# Patient Record
Sex: MALE | Race: BLACK OR AFRICAN AMERICAN | NOT HISPANIC OR LATINO | Employment: UNEMPLOYED | URBAN - METROPOLITAN AREA
[De-identification: names, ages, dates, MRNs, and addresses within clinical notes are randomized per-mention and may not be internally consistent; named-entity substitution may affect disease eponyms.]

---

## 2022-01-26 ENCOUNTER — HISTORICAL (OUTPATIENT)
Dept: GASTROENTEROLOGY | Facility: CLINIC | Age: 62
End: 2022-01-26

## 2022-01-27 ENCOUNTER — HISTORICAL (OUTPATIENT)
Dept: GASTROENTEROLOGY | Facility: CLINIC | Age: 62
End: 2022-01-27

## 2022-01-27 LAB — SARS-COV-2 AG RESP QL IA.RAPID: NEGATIVE

## 2022-01-28 ENCOUNTER — HISTORICAL (OUTPATIENT)
Dept: ENDOSCOPY | Facility: HOSPITAL | Age: 62
End: 2022-01-28

## 2022-02-14 ENCOUNTER — HISTORICAL (OUTPATIENT)
Dept: INTERNAL MEDICINE | Facility: CLINIC | Age: 62
End: 2022-02-14

## 2022-02-14 LAB
ABS NEUT (OLG): 2.92 (ref 2.1–9.2)
ALBUMIN SERPL-MCNC: 4.2 G/DL (ref 3.4–4.8)
ALBUMIN/GLOB SERPL: 1 {RATIO} (ref 1.1–2)
ALP SERPL-CCNC: 80 U/L (ref 40–150)
ALT SERPL-CCNC: 8 U/L (ref 0–55)
AST SERPL-CCNC: 32 U/L (ref 5–34)
BASOPHILS # BLD AUTO: 0 10*3/UL (ref 0–0.2)
BASOPHILS NFR BLD AUTO: 1 %
BILIRUB SERPL-MCNC: 0.9 MG/DL
BILIRUBIN DIRECT+TOT PNL SERPL-MCNC: 0.3 (ref 0–0.5)
BILIRUBIN DIRECT+TOT PNL SERPL-MCNC: 0.6 (ref 0–0.8)
BUN SERPL-MCNC: 7.6 MG/DL (ref 8.4–25.7)
CALCIUM SERPL-MCNC: 9.3 MG/DL (ref 8.7–10.5)
CHLORIDE SERPL-SCNC: 102 MMOL/L (ref 98–107)
CHOLEST SERPL-MCNC: 230 MG/DL
CHOLEST/HDLC SERPL: 3 {RATIO} (ref 0–5)
CO2 SERPL-SCNC: 26 MMOL/L (ref 23–31)
CREAT SERPL-MCNC: 0.77 MG/DL (ref 0.73–1.18)
EOSINOPHIL # BLD AUTO: 0.2 10*3/UL (ref 0–0.9)
EOSINOPHIL NFR BLD AUTO: 3 %
ERYTHROCYTE [DISTWIDTH] IN BLOOD BY AUTOMATED COUNT: 14.4 % (ref 11.5–14.5)
EST. AVERAGE GLUCOSE BLD GHB EST-MCNC: 99.7 MG/DL
FLAG2 (OHS): 70
FLAG3 (OHS): 80
FLAGS (OHS): 80
GLOBULIN SER-MCNC: 4 G/DL (ref 2.4–3.5)
GLUCOSE SERPL-MCNC: 116 MG/DL (ref 82–115)
HBA1C MFR BLD: 5.1 %
HCT VFR BLD AUTO: 43.1 % (ref 40–51)
HDLC SERPL-MCNC: 73 MG/DL (ref 35–60)
HEMOLYSIS INTERF INDEX SERPL-ACNC: 5
HGB BLD-MCNC: 14.5 G/DL (ref 13.5–17.5)
HIV 1+2 AB+HIV1 P24 AG SERPL QL IA: 0.05
HIV 1+2 AB+HIV1 P24 AG SERPL QL IA: NONREACTIVE
ICTERIC INTERF INDEX SERPL-ACNC: 1
IMM GRANULOCYTES # BLD AUTO: 0.02 10*3/UL
IMM GRANULOCYTES NFR BLD AUTO: 0 %
LDLC SERPL CALC-MCNC: 136 MG/DL (ref 50–140)
LIPEMIC INTERF INDEX SERPL-ACNC: 3
LOW EVENT # SUSPECT FLAG (OHS): 80
LYMPHOCYTES # BLD AUTO: 1.6 10*3/UL (ref 0.6–4.6)
LYMPHOCYTES NFR BLD AUTO: 31 %
MANUAL DIFF? (OHS): NO
MCH RBC QN AUTO: 30.2 PG (ref 26–34)
MCHC RBC AUTO-ENTMCNC: 33.6 G/DL (ref 31–37)
MCV RBC AUTO: 89.8 FL (ref 80–100)
MO BLASTS SUSPECT FLAG (OHS): 30
MONOCYTES # BLD AUTO: 0.4 10*3/UL (ref 0.1–1.3)
MONOCYTES NFR BLD AUTO: 8 %
NEUTROPHILS # BLD AUTO: 2.92 10*3/UL (ref 2.1–9.2)
NEUTROPHILS NFR BLD AUTO: 57 %
NRBC BLD AUTO-RTO: 0 % (ref 0–0.2)
PLATELET # BLD AUTO: 109 10*3/UL (ref 130–400)
PLATELET CLUMPS SUSPECT FLAG (OHS): 160
PMV BLD AUTO: 11 FL (ref 7.4–10.4)
POTASSIUM SERPL-SCNC: 3.3 MMOL/L (ref 3.5–5.1)
PROT SERPL-MCNC: 8.2 G/DL (ref 5.8–7.6)
PSA SERPL-MCNC: 0.5 NG/ML
RBC # BLD AUTO: 4.8 10*6/UL (ref 4.5–5.9)
SODIUM SERPL-SCNC: 138 MMOL/L (ref 136–145)
TRIGL SERPL-MCNC: 107 MG/DL (ref 34–140)
TSH SERPL-ACNC: 0.58 M[IU]/L (ref 0.35–4.94)
VLDLC SERPL CALC-MCNC: 21 MG/DL
WBC # SPEC AUTO: 5.1 10*3/UL (ref 4.5–11)

## 2022-03-09 ENCOUNTER — HISTORICAL (OUTPATIENT)
Dept: RADIOLOGY | Facility: HOSPITAL | Age: 62
End: 2022-03-09

## 2022-03-09 ENCOUNTER — HISTORICAL (OUTPATIENT)
Dept: ADMINISTRATIVE | Facility: HOSPITAL | Age: 62
End: 2022-03-09

## 2022-03-17 ENCOUNTER — HISTORICAL (OUTPATIENT)
Dept: RADIOLOGY | Facility: HOSPITAL | Age: 62
End: 2022-03-17

## 2022-04-11 ENCOUNTER — HISTORICAL (OUTPATIENT)
Dept: ADMINISTRATIVE | Facility: HOSPITAL | Age: 62
End: 2022-04-11

## 2022-04-29 VITALS
WEIGHT: 200.19 LBS | DIASTOLIC BLOOD PRESSURE: 80 MMHG | BODY MASS INDEX: 29.65 KG/M2 | SYSTOLIC BLOOD PRESSURE: 131 MMHG | HEIGHT: 69 IN

## 2022-05-10 ENCOUNTER — OFFICE VISIT (OUTPATIENT)
Dept: SURGERY | Facility: CLINIC | Age: 62
End: 2022-05-10
Payer: MEDICAID

## 2022-05-10 VITALS
WEIGHT: 188.5 LBS | TEMPERATURE: 98 F | HEIGHT: 69 IN | OXYGEN SATURATION: 97 % | DIASTOLIC BLOOD PRESSURE: 77 MMHG | BODY MASS INDEX: 27.92 KG/M2 | SYSTOLIC BLOOD PRESSURE: 127 MMHG | HEART RATE: 91 BPM | RESPIRATION RATE: 20 BRPM

## 2022-05-10 DIAGNOSIS — M79.89 MASS OF SOFT TISSUE OF NECK: ICD-10-CM

## 2022-05-10 DIAGNOSIS — K13.79 NODULE OF CHEEK: Primary | ICD-10-CM

## 2022-05-10 DIAGNOSIS — D31.92 BENIGN NEOPLASM OF LEFT EYE: ICD-10-CM

## 2022-05-10 DIAGNOSIS — R22.0 FACIAL MASS: ICD-10-CM

## 2022-05-10 PROCEDURE — 99214 OFFICE O/P EST MOD 30 MIN: CPT | Mod: PBBFAC

## 2022-05-10 RX ORDER — FAMOTIDINE 40 MG/1
40 TABLET, FILM COATED ORAL
COMMUNITY
Start: 2021-10-13 | End: 2022-08-01 | Stop reason: SDUPTHER

## 2022-05-10 RX ORDER — GABAPENTIN 100 MG/1
200 CAPSULE ORAL
COMMUNITY
Start: 2022-04-04 | End: 2022-08-01

## 2022-05-10 NOTE — PROGRESS NOTES
[facility] General Surgery Clinic Note    CC: facial and neck nodules    Arcadio Scott is a 61 y.o. male presenting with  multiple nodules on his face and neck.     HPI:   Mr. Scott noted these nodules first appeared in 2019 and have progressively worsened. He notes some associated pruritis but denies any significant pain or drainage from the area. He has tried OTC boil cream with no improvement. He noted previously having some nodules removed on his R cheek in 1993, but does not recall associated diagnosis or pathology. He denies recurrence of these nodules on this right cheek. He does have a family hx significant for these nodules, specifically in his father and brother. He denies associated fevers. He denies use of blood thinners or previous adverse reactions related to anesthesia medications.    PMH:  -Lower extremity neuropathy      PSH:   - Nodule removal right check - 1993     FamHx:   Family History   Problem Relation Age of Onset    Cancer Father         Unsure of source    Diabetes Mother     Heart disease Mother     Aneurysm Sister     Diabetes Brother     Stomach cancer Brother      - Father and brother facial nodules      SocHx:  Social History     Socioeconomic History    Marital status: Single   Tobacco Use    Smoking status: Smoker, Current Status Unknown     Types: Cigars    Smokeless tobacco: Never Used    Tobacco comment: 1 or 2 cigars a week   Substance and Sexual Activity    Alcohol use: Yes     Alcohol/week: 1.0 standard drink     Types: 1 Cans of beer per week     Comment: every now and then    Drug use: Never       Allergies:   Review of patient's allergies indicates:  No Known Allergies    Medications:  Current Outpatient Medications on File Prior to Visit   Medication Sig Dispense Refill    famotidine (PEPCID) 40 MG tablet Take 40 mg by mouth.      gabapentin (NEURONTIN) 100 MG capsule Take 200 mg by mouth.       No current facility-administered medications on file prior to visit.              Objective:  Physical Exam:  Gen: Alert and oriented   HEENT: See images below. L facial nodules, upper nodule cystic-feeling, lower two nodules soft and mobile. Neck- skin tag most superior, lower nodules firm, easily mobile. All nodules without drainage our punctae. CN II-XII grossly intact  Resp/ CV: RRR, coarse breath sounds bilaterally  Extremities: Move all spontaneously                       A/P:   61 y.o. male facial and neck masses which appear to lipomatous  in nature with one possible cystic mass. R/O of sarcoma or other cause due to location of masses.     - MRI of head and neck   -Follow-up in plastics clinic in three weeks after MRI     Rhiannon Martins, MS3   [facility] Surgery           Addendum  Patient seen and examined with medical student.  Agree with above.    Multiple nodules on the face and neck.  Due to location and nature, will obtain MRI prior to any intervention just to ensure that these do not have a malignant appearance or characteristics.    After MRI, will have him follow up in our plastic surgery clinic to discuss removal.    Taco Hardin MD  HO-3, [facility]  05/10/2022

## 2022-05-10 NOTE — PROGRESS NOTES
I have reviewed the notes, assessments, and/or procedures performed by the resident, I concur with her/his documentation of Arcadio Scott.   Agree with imaging and necessity for Plastics referral.  Lexii Gallo MD

## 2022-05-14 NOTE — H&P
Chief Complaint  colonoscopy  History of Present Illness  62yo male with no known PMH, here today for screening colonoscopy.? Tolerated prep.? No complaints at present, just nervous.  Review of Systems  Constitutional: No fever, No chills.  Eye: No recent visual problem.  Respiratory: No shortness of breath, No cough.  Cardiovascular: No chest pain.  Breast: No pain.  Gastrointestinal: No nausea, No vomiting, No diarrhea.  Endocrine: No excessive thirst, No polyuria, No cold intolerance, No heat intolerance.  Integumentary: No rash.  Neurologic: Alert and oriented X4.  Physical Exam  NAD, nontoxic appearing  Head normocephalic, atraumatic  PERRLA, EOMI  CTAB, normal respiratory effort  RRR, good?distal pulses  Abdomen soft, NT, ND, +BS  Gross motor intact in both upper and lower extremities  Appropriate mood and affect?  Assessment/Plan  62yo male with no known PMH, here today for screening colonoscopy.?  - colonoscopy today  - risks and benefits discussed with patient informed consent signed  ?  ?   Agree w above. Colonoscopy today.   Problem List/Past Medical History  Ongoing  Wellness examination  Historical  No qualifying data  Procedure/Surgical History  DENIES   Medications  Inpatient  No active inpatient medications  Home  ondansetron 4 mg oral tablet, disintegrating, 4 mg= 1 tab(s), Oral, TID  Pepcid 40 mg oral tablet, 40 mg= 1 tab(s), Oral, BID, 2 refills,? ?Still taking, not as prescribed: prn Last Dose Date/Time Unknown  Allergies  No Known Allergies  Social History  Abuse/Neglect  No, 12/06/2021  Alcohol  Current, Beer, 1-2 times per week, 12/06/2021  Employment/School  Unemployed, 12/06/2021  Exercise  Financial/Legal Situation  None, 12/06/2021  Home/Environment  Lives with Significant other. Living situation: Home/Independent. Mobile home, 12/06/2021    Never in , 12/06/2021  Nutrition/Health  Regular, Good, 12/06/2021  Sexual  Sexually active: No. Gender Identity Identifies as male.,  12/06/2021  Spiritual/Cultural  Restorationist, 12/06/2021  Substance Use  Never, 12/06/2021  Tobacco  Former smoker, quit more than 30 days ago, N/A, 12/06/2021  Family History  Cancer: Father.  Cardiac arrest.: Mother.

## 2022-05-18 ENCOUNTER — TELEPHONE (OUTPATIENT)
Dept: INTERNAL MEDICINE | Facility: CLINIC | Age: 62
End: 2022-05-18
Payer: MEDICAID

## 2022-05-18 NOTE — TELEPHONE ENCOUNTER
Unfortunately,    The letter from the company [employer] doesn't state that he was fired or let go. In fact, it states that patient discontinued work himself in January 2022. At that time, I had only seen patient once and did not instruct him to stop working. Therefore, I cannot confirm this information to be able to certainly prove that patient cannot work. Further, patient may need to find work that is less physically demanding. Unfortunately, at this time, he's not had any major debilitations diagnosed that would prevent him from performing any type of work altogether.     If he could submit something else to corroborate his story or try to complete further work-up of his lower back pain, I could reconsider this request.

## 2022-05-31 ENCOUNTER — TELEPHONE (OUTPATIENT)
Dept: INTERNAL MEDICINE | Facility: CLINIC | Age: 62
End: 2022-05-31
Payer: MEDICAID

## 2022-05-31 DIAGNOSIS — G89.29 CHRONIC BILATERAL LOW BACK PAIN WITH BILATERAL SCIATICA: Primary | ICD-10-CM

## 2022-05-31 DIAGNOSIS — R22.0 FACIAL MASS: Primary | ICD-10-CM

## 2022-05-31 DIAGNOSIS — M54.41 CHRONIC BILATERAL LOW BACK PAIN WITH BILATERAL SCIATICA: Primary | ICD-10-CM

## 2022-05-31 DIAGNOSIS — M54.42 CHRONIC BILATERAL LOW BACK PAIN WITH BILATERAL SCIATICA: Primary | ICD-10-CM

## 2022-05-31 NOTE — TELEPHONE ENCOUNTER
Please inform patient that insurance denied MRI L spine. So, I will refer to Physical Therapy. If sx persist even after therapy, I will re-order MRI.

## 2022-06-03 ENCOUNTER — HOSPITAL ENCOUNTER (OUTPATIENT)
Dept: RADIOLOGY | Facility: HOSPITAL | Age: 62
Discharge: HOME | End: 2022-06-03
Attending: STUDENT IN AN ORGANIZED HEALTH CARE EDUCATION/TRAINING PROGRAM
Payer: MEDICAID

## 2022-06-03 ENCOUNTER — TELEPHONE (OUTPATIENT)
Dept: INTERNAL MEDICINE | Facility: CLINIC | Age: 62
End: 2022-06-03

## 2022-06-03 PROCEDURE — 70540 MRI ORBIT/FACE/NECK W/O DYE: CPT | Mod: TC

## 2022-06-29 ENCOUNTER — OFFICE VISIT (OUTPATIENT)
Dept: OTOLARYNGOLOGY | Facility: CLINIC | Age: 62
End: 2022-06-29
Payer: MEDICAID

## 2022-06-29 VITALS
BODY MASS INDEX: 29.03 KG/M2 | WEIGHT: 196 LBS | RESPIRATION RATE: 16 BRPM | SYSTOLIC BLOOD PRESSURE: 111 MMHG | TEMPERATURE: 99 F | HEIGHT: 69 IN | HEART RATE: 80 BPM | DIASTOLIC BLOOD PRESSURE: 75 MMHG

## 2022-06-29 DIAGNOSIS — R22.0 FACIAL MASS: Primary | ICD-10-CM

## 2022-06-29 DIAGNOSIS — M79.89 MASS OF SOFT TISSUE OF NECK: ICD-10-CM

## 2022-06-29 PROCEDURE — 99214 OFFICE O/P EST MOD 30 MIN: CPT | Mod: PBBFAC

## 2022-06-29 NOTE — PROGRESS NOTES
[facility]  Otolaryngology Clinic Note    Arcadio Scott  Encounter Date: 6/29/2022  YOB: 1960  Physician: Jamilah Mrashall    Chief Complaint: Facial lesions    HPI: Arcadio Scott is a 61 y.o. male with multiple cysts of head and neck. Largest is on left cheek and been present for 4 years. Minimal growth, no pain. Had cysts on right cheek excised decades ago. No drainage from lesions.     ROS:   General: Negative except per HPI  Skin: Denies rash, ulcer, or lesion.  Eyes: Denies vision changes or diplopia.  Ears: Negative except per HPI  Nose: Negative except per HPI  Throat/mouth: Negative except per HPI  Cardiovascular: Negative except per HPI  Respiratory: Negative except per HPI  Neck: Negative except per HPI  Endocrine: Negative except per HPI  Neurologic: Negative except per HPI    Other 10-point review of systems negative except per HPI      Review of patient's allergies indicates:  No Known Allergies    History reviewed. No pertinent past medical history.    History reviewed. No pertinent surgical history.    Social History     Socioeconomic History    Marital status: Single   Tobacco Use    Smoking status: Smoker, Current Status Unknown     Types: Cigars    Smokeless tobacco: Never Used    Tobacco comment: 1 or 2 cigars a week   Substance and Sexual Activity    Alcohol use: Yes     Alcohol/week: 1.0 standard drink     Types: 1 Cans of beer per week     Comment: every now and then    Drug use: Never       Family History   Problem Relation Age of Onset    Cancer Father         Unsure of source    Diabetes Mother     Heart disease Mother     Aneurysm Sister     Diabetes Brother     Stomach cancer Brother        Outpatient Encounter Medications as of 6/29/2022   Medication Sig Dispense Refill    famotidine (PEPCID) 40 MG tablet Take 40 mg by mouth.      gabapentin (NEURONTIN) 100 MG capsule Take 200 mg by mouth.       No facility-administered encounter medications  "on file as of 6/29/2022.       Physical Exam:  Vitals:    06/29/22 1119   BP: 111/75   BP Location: Right arm   Patient Position: Sitting   BP Method: Medium (Automatic)   Pulse: 80   Resp: 16   Temp: 98.8 °F (37.1 °C)   Weight: 88.9 kg (196 lb)   Height: 5' 9" (1.753 m)       Constitutional  General Appearance: well nourished, well-developed, AAO x3, NAD  HEENT  Face: 4-5 cm balotable left cheek cyst, 2 smaller firmer cysts more superiorly between large cyst and left lateral canthus            Eyes: PEERLA, EOMI, normal conjunctivae  Ears: Hearing well at conversation level; lobo - no lateralization, Rinne AC > BC   AD: auricle normal, EAC normal, TM intact, no OBIE   AS: auricle normal, EAC normal, TM intact, no OBIE   Vestibular: ambulates without gait disturbance  Nose: septum midline, no inferior turbinate hypertrophy, no polyps, moist mucosa without erythema or blue hue  OC/OP: dentition moderate, no oral lesions, tongue/FOM/BOT- soft, no leukoplakia/ulcerations/ tenderness  Nasopharynx, Hypopharynx, and Larynx:    Indirect: attempted, limited view due to patient intolerance  Neck: soft, non-tender, no palpable lymph nodes   Thyroid region- no nodules or goiter  Skin tag/cyst  Neuro: CN II - XII intact bilaterally  Cardiovascular: peripheral pulses palpable  Respiratory: non-labored respirations  Psychiatric: oriented to time, place and person, no depression, anxiety or agitation      Pertinent Data:  ? LABS:  ? AUDIO:  ? CT Scan:  MRI:         Imaging:   I personally reviewed the following images:    Summary of Outside Records:      Assessment/Plan:  Arcadio Scott is a 61 y.o. male with facial cysts, likely sebaceous cyst. MRI with findings consistent with this.     -Plan for surgical removal in OR of facial and neck cysts  -Consent obtained, plan for July 18th  - RTC 1 week post op    Jamilah Marshall MD  [facility] Department of Otolaryngology  HO-IV          "

## 2022-06-29 NOTE — H&P (VIEW-ONLY)
[facility]  Otolaryngology Clinic Note    Arcadio Scott  Encounter Date: 6/29/2022  YOB: 1960  Physician: Jamilah Marshall    Chief Complaint: Facial lesions    HPI: Arcadio Scott is a 61 y.o. male with multiple cysts of head and neck. Largest is on left cheek and been present for 4 years. Minimal growth, no pain. Had cysts on right cheek excised decades ago. No drainage from lesions.     ROS:   General: Negative except per HPI  Skin: Denies rash, ulcer, or lesion.  Eyes: Denies vision changes or diplopia.  Ears: Negative except per HPI  Nose: Negative except per HPI  Throat/mouth: Negative except per HPI  Cardiovascular: Negative except per HPI  Respiratory: Negative except per HPI  Neck: Negative except per HPI  Endocrine: Negative except per HPI  Neurologic: Negative except per HPI    Other 10-point review of systems negative except per HPI      Review of patient's allergies indicates:  No Known Allergies    History reviewed. No pertinent past medical history.    History reviewed. No pertinent surgical history.    Social History     Socioeconomic History    Marital status: Single   Tobacco Use    Smoking status: Smoker, Current Status Unknown     Types: Cigars    Smokeless tobacco: Never Used    Tobacco comment: 1 or 2 cigars a week   Substance and Sexual Activity    Alcohol use: Yes     Alcohol/week: 1.0 standard drink     Types: 1 Cans of beer per week     Comment: every now and then    Drug use: Never       Family History   Problem Relation Age of Onset    Cancer Father         Unsure of source    Diabetes Mother     Heart disease Mother     Aneurysm Sister     Diabetes Brother     Stomach cancer Brother        Outpatient Encounter Medications as of 6/29/2022   Medication Sig Dispense Refill    famotidine (PEPCID) 40 MG tablet Take 40 mg by mouth.      gabapentin (NEURONTIN) 100 MG capsule Take 200 mg by mouth.       No facility-administered encounter medications  "on file as of 6/29/2022.       Physical Exam:  Vitals:    06/29/22 1119   BP: 111/75   BP Location: Right arm   Patient Position: Sitting   BP Method: Medium (Automatic)   Pulse: 80   Resp: 16   Temp: 98.8 °F (37.1 °C)   Weight: 88.9 kg (196 lb)   Height: 5' 9" (1.753 m)       Constitutional  General Appearance: well nourished, well-developed, AAO x3, NAD  HEENT  Face: 4-5 cm balotable left cheek cyst, 2 smaller firmer cysts more superiorly between large cyst and left lateral canthus            Eyes: PEERLA, EOMI, normal conjunctivae  Ears: Hearing well at conversation level; lobo - no lateralization, Rinne AC > BC   AD: auricle normal, EAC normal, TM intact, no OBIE   AS: auricle normal, EAC normal, TM intact, no OBIE   Vestibular: ambulates without gait disturbance  Nose: septum midline, no inferior turbinate hypertrophy, no polyps, moist mucosa without erythema or blue hue  OC/OP: dentition moderate, no oral lesions, tongue/FOM/BOT- soft, no leukoplakia/ulcerations/ tenderness  Nasopharynx, Hypopharynx, and Larynx:    Indirect: attempted, limited view due to patient intolerance  Neck: soft, non-tender, no palpable lymph nodes   Thyroid region- no nodules or goiter  Skin tag/cyst  Neuro: CN II - XII intact bilaterally  Cardiovascular: peripheral pulses palpable  Respiratory: non-labored respirations  Psychiatric: oriented to time, place and person, no depression, anxiety or agitation      Pertinent Data:  ? LABS:  ? AUDIO:  ? CT Scan:  MRI:         Imaging:   I personally reviewed the following images:    Summary of Outside Records:      Assessment/Plan:  Arcadio Scott is a 61 y.o. male with facial cysts, likely sebaceous cyst. MRI with findings consistent with this.     -Plan for surgical removal in OR of facial and neck cysts  -Consent obtained, plan for July 18th  - RTC 1 week post op    Jamilah Marshall MD  [facility] Department of Otolaryngology  HO-IV          "

## 2022-07-05 NOTE — PROGRESS NOTES
I have reviewed the notes, assessments, and/or procedures performed this visit, and I concur with the documentation.    Marcelo Capps M.D.

## 2022-07-08 ENCOUNTER — ANESTHESIA EVENT (OUTPATIENT)
Dept: SURGERY | Facility: HOSPITAL | Age: 62
End: 2022-07-08
Payer: MEDICAID

## 2022-07-08 NOTE — ANESTHESIA PREPROCEDURE EVALUATION
07/08/2022  Arcadio Scott is a 61 y.o., male with PMHx of HLD, smoking, GERD presents for excision of lesions to face and neck.     COVID STATUS: PREV. COVID=NEG   Latest Reference Range & Units 07/18/22 06:11   SARS Coronavirus 2 Antigen Negative  Negative     BETA-BLOCKER: NONE    PROBLEM LIST:  -  FACIAL/NECK CYSTS, LIKELY SEBACEOUS      - 6/3/22 MRI FACE/NECK = Suspected epidermoid cyst in the left periorbital soft tissues, with multiple additional subcutaneous cystic lesions in the bilateral facial soft tissues.  -  GERD  -  HLD per LABS  -  CHRONIC BACK PAIN  -  ETOH 1-2 x/WEEK  -  SMOKER ?PPY    AM Rx DOS: PEPCID, GABAPENTIN    ORDERS -   SURGEON: 10/13/21 EKG, CXR; 2/14/22 CBC, CMP, A1c, TSH;  ANESTHESIA: NONE    Pre-op Assessment    I have reviewed the NPO Status.      Review of Systems  Anesthesia Hx:  No problems with previous Anesthesia  Denies Family Hx of Anesthesia complications.   Denies Personal Hx of Anesthesia complications.   Social:  Smoker    Cardiovascular:  Cardiovascular Normal     Pulmonary:  Pulmonary Normal    Renal/:  Renal/ Normal     Hepatic/GI:   GERD, well controlled    Neurological:  Neurology Normal    Endocrine:  Endocrine Normal      Vitals:    07/18/22 0533 07/18/22 0544 07/18/22 0602 07/18/22 0629   BP: 138/71  138/71    Pulse: 70 84     Resp:  16     Temp: 36.9 °C (98.4 °F)      TempSrc: Oral      SpO2: 97% 98%     Weight:    91.1 kg (200 lb 13.4 oz)         Physical Exam  General: Alert, Cooperative and Well nourished    Airway:  Mallampati: II   Mouth Opening: Normal  TM Distance: Normal  Tongue: Normal  Neck ROM: Normal ROM    Dental:  Intact    Chest/Lungs:  Normal Respiratory Rate    Heart:  Rate: Normal  Rhythm: Regular Rhythm  Sounds: Normal      Lab Results   Component Value Date    WBC 5.1 02/14/2022    HGB 14.5 02/14/2022    HCT 43.1 02/14/2022    MCV 89.8  02/14/2022     02/14/2022        CMP  Sodium Level   Date Value Ref Range Status   02/14/2022 138 136 - 145      Potassium Level   Date Value Ref Range Status   02/14/2022 3.3 3.5 - 5.1      Carbon Dioxide   Date Value Ref Range Status   02/14/2022 26 23 - 31      Blood Urea Nitrogen   Date Value Ref Range Status   02/14/2022 7.6 8.4 - 25.7      Creatinine   Date Value Ref Range Status   02/14/2022 0.77 0.73 - 1.18      Calcium Level Total   Date Value Ref Range Status   02/14/2022 9.3 8.7 - 10.5      Albumin Level   Date Value Ref Range Status   02/14/2022 4.2 3.4 - 4.8      Bilirubin Total   Date Value Ref Range Status   02/14/2022 0.9 <=1.5      Alkaline Phosphatase   Date Value Ref Range Status   02/14/2022 80 40 - 150      Aspartate Aminotransferase   Date Value Ref Range Status   02/14/2022 32 5 - 34      Alanine Aminotransferase   Date Value Ref Range Status   02/14/2022 8 0 - 55      Estimated GFR-Non    Date Value Ref Range Status   02/14/2022 >105 >=90              Anesthesia Plan  Type of Anesthesia, risks & benefits discussed:    Anesthesia Type: Gen ETT  Intra-op Monitoring Plan: Standard ASA Monitors  Post Op Pain Control Plan: IV/PO Opioids PRN  Induction:  IV  Airway Plan: Direct  Informed Consent: Informed consent signed with the Patient and all parties understand the risks and agree with anesthesia plan.  All questions answered.   ASA Score: 2  Day of Surgery Review of History & Physical: H&P Update referred to the surgeon/provider.    Ready For Surgery From Anesthesia Perspective.     .

## 2022-07-13 ENCOUNTER — OFFICE VISIT (OUTPATIENT)
Dept: OTOLARYNGOLOGY | Facility: CLINIC | Age: 62
End: 2022-07-13
Payer: MEDICAID

## 2022-07-13 VITALS
HEART RATE: 63 BPM | DIASTOLIC BLOOD PRESSURE: 74 MMHG | SYSTOLIC BLOOD PRESSURE: 120 MMHG | WEIGHT: 200.88 LBS | TEMPERATURE: 98 F | BODY MASS INDEX: 29.67 KG/M2

## 2022-07-13 DIAGNOSIS — R22.0 FACIAL MASS: Primary | ICD-10-CM

## 2022-07-13 PROCEDURE — 99213 OFFICE O/P EST LOW 20 MIN: CPT | Mod: PBBFAC

## 2022-07-13 NOTE — PROGRESS NOTES
[facility]  Otolaryngology Clinic Note    Arcadio Scott  Encounter Date: 7/13/2022  YOB: 1960  Physician: Jamilah Marshall    Chief Complaint: Facial lesions    HPI: Arcadio Scott is a 61 y.o. male with multiple cysts of head and neck. Largest is on left cheek and been present for 4 years. Minimal growth, no pain. Had cysts on right cheek excised decades ago. No drainage from lesions.     7/13/22: Patient mistakenly booked for appointment today.     ROS:   General: Negative except per HPI  Skin: Denies rash, ulcer, or lesion.  Eyes: Denies vision changes or diplopia.  Ears: Negative except per HPI  Nose: Negative except per HPI  Throat/mouth: Negative except per HPI  Cardiovascular: Negative except per HPI  Respiratory: Negative except per HPI  Neck: Negative except per HPI  Endocrine: Negative except per HPI  Neurologic: Negative except per HPI    Other 10-point review of systems negative except per HPI      Review of patient's allergies indicates:  No Known Allergies    History reviewed. No pertinent past medical history.    Past Surgical History:   Procedure Laterality Date    CYST REMOVAL      Facial       Social History     Socioeconomic History    Marital status: Single   Tobacco Use    Smoking status: Smoker, Current Status Unknown     Types: Cigars    Smokeless tobacco: Never Used    Tobacco comment: 1 or 2 cigars a week   Substance and Sexual Activity    Alcohol use: Yes     Alcohol/week: 1.0 standard drink     Types: 1 Cans of beer per week     Comment: every now and then    Drug use: Never       Family History   Problem Relation Age of Onset    Cancer Father         Unsure of source    Diabetes Mother     Heart disease Mother     Aneurysm Sister     Diabetes Brother     Stomach cancer Brother        Outpatient Encounter Medications as of 7/13/2022   Medication Sig Dispense Refill    famotidine (PEPCID) 40 MG tablet Take 40 mg by mouth.      gabapentin  (NEURONTIN) 100 MG capsule Take 200 mg by mouth.       No facility-administered encounter medications on file as of 7/13/2022.       Physical Exam:  Vitals:    07/13/22 0901   BP: 120/74   Pulse: 63   Temp: 97.8 °F (36.6 °C)   TempSrc: Oral   Weight: 91.1 kg (200 lb 14.4 oz)       Constitutional  General Appearance: well nourished, well-developed, AAO x3, NAD  HEENT  Face: 4-5 cm balotable left cheek cyst, 2 smaller firmer cysts more superiorly between large cyst and left lateral canthus            Eyes: PEERLA, EOMI, normal conjunctivae  Ears: Hearing well at conversation level; lobo - no lateralization, Rinne AC > BC   AD: auricle normal, EAC normal, TM intact, no OBIE   AS: auricle normal, EAC normal, TM intact, no OBIE   Vestibular: ambulates without gait disturbance  Nose: septum midline, no inferior turbinate hypertrophy, no polyps, moist mucosa without erythema or blue hue  OC/OP: dentition moderate, no oral lesions, tongue/FOM/BOT- soft, no leukoplakia/ulcerations/ tenderness  Nasopharynx, Hypopharynx, and Larynx:    Indirect: attempted, limited view due to patient intolerance  Neck: soft, non-tender, no palpable lymph nodes   Thyroid region- no nodules or goiter  Skin tag/cyst  Neuro: CN II - XII intact bilaterally  Cardiovascular: peripheral pulses palpable  Respiratory: non-labored respirations  Psychiatric: oriented to time, place and person, no depression, anxiety or agitation      Pertinent Data:  ? LABS:  ? AUDIO:  ? CT Scan:  MRI:         Imaging:   I personally reviewed the following images:    Summary of Outside Records:      Assessment/Plan:  Arcadio Scott is a 61 y.o. male with facial cysts, likely sebaceous cyst. MRI with findings consistent with this.     -Plan for surgical removal in OR of facial and neck cysts  -Consent obtained, plan for July 18th  -RTC 1 week post op    Jamilah Marshall MD  [facility] Department of Otolaryngology  [facility]

## 2022-07-14 NOTE — PROGRESS NOTES
Multiple attempts to reach patient for PAT assessment unsuccessful . Cat in ENT clinic notified on 5/13/22. Futher atytempts made today 5/14/22 no answer anbds voice mails left. Patient has not returned qany phone calls for PAT.

## 2022-07-15 NOTE — PROGRESS NOTES
Patient has not answered any phone calls for PAT voice mail message left for patient to call back but has not returned any calls as of this date .

## 2022-07-18 ENCOUNTER — HOSPITAL ENCOUNTER (OUTPATIENT)
Facility: HOSPITAL | Age: 62
Discharge: HOME | End: 2022-07-18
Attending: OTOLARYNGOLOGY | Admitting: OTOLARYNGOLOGY
Payer: MEDICAID

## 2022-07-18 ENCOUNTER — ANESTHESIA (OUTPATIENT)
Dept: SURGERY | Facility: HOSPITAL | Age: 62
End: 2022-07-18
Payer: MEDICAID

## 2022-07-18 VITALS
DIASTOLIC BLOOD PRESSURE: 78 MMHG | BODY MASS INDEX: 29.66 KG/M2 | SYSTOLIC BLOOD PRESSURE: 130 MMHG | WEIGHT: 200.81 LBS | TEMPERATURE: 99 F | OXYGEN SATURATION: 96 % | HEART RATE: 68 BPM | RESPIRATION RATE: 18 BRPM

## 2022-07-18 DIAGNOSIS — L72.0 EPIDERMAL CYST OF NECK: ICD-10-CM

## 2022-07-18 DIAGNOSIS — L72.0 EPIDERMAL CYST OF FACE: ICD-10-CM

## 2022-07-18 DIAGNOSIS — R22.0 FACIAL MASS: ICD-10-CM

## 2022-07-18 DIAGNOSIS — R22.0 FACIAL MASS: Primary | ICD-10-CM

## 2022-07-18 LAB
CTP QC/QA: YES
SARS-COV-2 AG RESP QL IA.RAPID: NEGATIVE

## 2022-07-18 PROCEDURE — 63600175 PHARM REV CODE 636 W HCPCS: Performed by: ANESTHESIOLOGY

## 2022-07-18 PROCEDURE — 25000003 PHARM REV CODE 250: Performed by: OTOLARYNGOLOGY

## 2022-07-18 PROCEDURE — 36000707: Performed by: OTOLARYNGOLOGY

## 2022-07-18 PROCEDURE — 71000033 HC RECOVERY, INTIAL HOUR: Performed by: OTOLARYNGOLOGY

## 2022-07-18 PROCEDURE — 25000242 PHARM REV CODE 250 ALT 637 W/ HCPCS: Performed by: ANESTHESIOLOGY

## 2022-07-18 PROCEDURE — 00300 ANES ALL PX INTEG H/N/PTRUNK: CPT | Performed by: OTOLARYNGOLOGY

## 2022-07-18 PROCEDURE — 36000706: Performed by: OTOLARYNGOLOGY

## 2022-07-18 PROCEDURE — 94640 AIRWAY INHALATION TREATMENT: CPT | Mod: 59

## 2022-07-18 PROCEDURE — 71000015 HC POSTOP RECOV 1ST HR: Performed by: OTOLARYNGOLOGY

## 2022-07-18 PROCEDURE — 37000008 HC ANESTHESIA 1ST 15 MINUTES: Performed by: OTOLARYNGOLOGY

## 2022-07-18 PROCEDURE — 63600175 PHARM REV CODE 636 W HCPCS: Performed by: NURSE ANESTHETIST, CERTIFIED REGISTERED

## 2022-07-18 PROCEDURE — 25000003 PHARM REV CODE 250: Performed by: NURSE ANESTHETIST, CERTIFIED REGISTERED

## 2022-07-18 PROCEDURE — 71000016 HC POSTOP RECOV ADDL HR: Performed by: OTOLARYNGOLOGY

## 2022-07-18 PROCEDURE — 37000009 HC ANESTHESIA EA ADD 15 MINS: Performed by: OTOLARYNGOLOGY

## 2022-07-18 RX ORDER — FENTANYL CITRATE 50 UG/ML
INJECTION, SOLUTION INTRAMUSCULAR; INTRAVENOUS
Status: DISCONTINUED | OUTPATIENT
Start: 2022-07-18 | End: 2022-07-18

## 2022-07-18 RX ORDER — MORPHINE SULFATE 2 MG/ML
2 INJECTION, SOLUTION INTRAMUSCULAR; INTRAVENOUS EVERY 5 MIN PRN
Status: DISCONTINUED | OUTPATIENT
Start: 2022-07-18 | End: 2022-07-18

## 2022-07-18 RX ORDER — LIDOCAINE HYDROCHLORIDE 10 MG/ML
1 INJECTION, SOLUTION EPIDURAL; INFILTRATION; INTRACAUDAL; PERINEURAL ONCE
Status: DISCONTINUED | OUTPATIENT
Start: 2022-07-18 | End: 2022-07-18 | Stop reason: HOSPADM

## 2022-07-18 RX ORDER — PHENYLEPHRINE HYDROCHLORIDE 10 MG/ML
INJECTION INTRAVENOUS
Status: DISCONTINUED | OUTPATIENT
Start: 2022-07-18 | End: 2022-07-18

## 2022-07-18 RX ORDER — MIDAZOLAM HYDROCHLORIDE 1 MG/ML
1 INJECTION INTRAMUSCULAR; INTRAVENOUS ONCE AS NEEDED
Status: COMPLETED | OUTPATIENT
Start: 2022-07-18 | End: 2022-07-18

## 2022-07-18 RX ORDER — BACITRACIN 500 [USP'U]/G
OINTMENT TOPICAL
Status: DISCONTINUED | OUTPATIENT
Start: 2022-07-18 | End: 2022-07-18 | Stop reason: HOSPADM

## 2022-07-18 RX ORDER — LIDOCAINE HYDROCHLORIDE 20 MG/ML
INJECTION INTRAVENOUS
Status: DISCONTINUED | OUTPATIENT
Start: 2022-07-18 | End: 2022-07-18

## 2022-07-18 RX ORDER — PROPOFOL 10 MG/ML
VIAL (ML) INTRAVENOUS
Status: DISCONTINUED | OUTPATIENT
Start: 2022-07-18 | End: 2022-07-18

## 2022-07-18 RX ORDER — ALBUTEROL SULFATE 0.83 MG/ML
2.5 SOLUTION RESPIRATORY (INHALATION)
Status: COMPLETED | OUTPATIENT
Start: 2022-07-18 | End: 2022-07-18

## 2022-07-18 RX ORDER — NEOSTIGMINE METHYLSULFATE 1 MG/ML
INJECTION, SOLUTION INTRAVENOUS
Status: DISCONTINUED | OUTPATIENT
Start: 2022-07-18 | End: 2022-07-18

## 2022-07-18 RX ORDER — GLYCOPYRROLATE 0.2 MG/ML
INJECTION INTRAMUSCULAR; INTRAVENOUS
Status: DISCONTINUED | OUTPATIENT
Start: 2022-07-18 | End: 2022-07-18

## 2022-07-18 RX ORDER — CEFAZOLIN SODIUM 1 G/3ML
INJECTION, POWDER, FOR SOLUTION INTRAMUSCULAR; INTRAVENOUS
Status: DISCONTINUED | OUTPATIENT
Start: 2022-07-18 | End: 2022-07-18

## 2022-07-18 RX ORDER — DEXAMETHASONE SODIUM PHOSPHATE 4 MG/ML
INJECTION, SOLUTION INTRA-ARTICULAR; INTRALESIONAL; INTRAMUSCULAR; INTRAVENOUS; SOFT TISSUE
Status: DISCONTINUED | OUTPATIENT
Start: 2022-07-18 | End: 2022-07-18

## 2022-07-18 RX ORDER — LIDOCAINE HYDROCHLORIDE 10 MG/ML
1 INJECTION, SOLUTION EPIDURAL; INFILTRATION; INTRACAUDAL; PERINEURAL ONCE
Status: ACTIVE | OUTPATIENT
Start: 2022-07-18

## 2022-07-18 RX ORDER — OXYCODONE HYDROCHLORIDE 5 MG/1
5 TABLET ORAL
Status: DISCONTINUED | OUTPATIENT
Start: 2022-07-18 | End: 2022-07-18 | Stop reason: HOSPADM

## 2022-07-18 RX ORDER — LIDOCAINE HCL/EPINEPHRINE/PF 2%-1:200K
VIAL (ML) INJECTION
Status: DISCONTINUED | OUTPATIENT
Start: 2022-07-18 | End: 2022-07-18 | Stop reason: HOSPADM

## 2022-07-18 RX ORDER — SODIUM CHLORIDE, SODIUM LACTATE, POTASSIUM CHLORIDE, CALCIUM CHLORIDE 600; 310; 30; 20 MG/100ML; MG/100ML; MG/100ML; MG/100ML
INJECTION, SOLUTION INTRAVENOUS CONTINUOUS
Status: DISCONTINUED | OUTPATIENT
Start: 2022-07-18 | End: 2022-07-18 | Stop reason: HOSPADM

## 2022-07-18 RX ORDER — HYDROCODONE BITARTRATE AND ACETAMINOPHEN 5; 325 MG/1; MG/1
1 TABLET ORAL EVERY 6 HOURS PRN
Qty: 14 TABLET | Refills: 0 | Status: SHIPPED | OUTPATIENT
Start: 2022-07-18 | End: 2022-08-01

## 2022-07-18 RX ORDER — ONDANSETRON 2 MG/ML
4 INJECTION INTRAMUSCULAR; INTRAVENOUS DAILY PRN
Status: DISCONTINUED | OUTPATIENT
Start: 2022-07-18 | End: 2022-07-18

## 2022-07-18 RX ORDER — ROCURONIUM BROMIDE 10 MG/ML
INJECTION, SOLUTION INTRAVENOUS
Status: DISCONTINUED | OUTPATIENT
Start: 2022-07-18 | End: 2022-07-18

## 2022-07-18 RX ORDER — MEPERIDINE HYDROCHLORIDE 25 MG/ML
12.5 INJECTION INTRAMUSCULAR; INTRAVENOUS; SUBCUTANEOUS EVERY 10 MIN PRN
Status: DISCONTINUED | OUTPATIENT
Start: 2022-07-18 | End: 2022-07-18

## 2022-07-18 RX ADMIN — ONDANSETRON 4 MG: 2 INJECTION INTRAMUSCULAR; INTRAVENOUS at 10:07

## 2022-07-18 RX ADMIN — PHENYLEPHRINE HYDROCHLORIDE 100 MCG: 10 INJECTION INTRAVENOUS at 08:07

## 2022-07-18 RX ADMIN — FENTANYL CITRATE 50 MCG: 50 INJECTION, SOLUTION INTRAMUSCULAR; INTRAVENOUS at 07:07

## 2022-07-18 RX ADMIN — DEXAMETHASONE SODIUM PHOSPHATE 8 MG: 4 INJECTION, SOLUTION INTRA-ARTICULAR; INTRALESIONAL; INTRAMUSCULAR; INTRAVENOUS; SOFT TISSUE at 07:07

## 2022-07-18 RX ADMIN — ALBUTEROL SULFATE 2.5 MG: 2.5 SOLUTION RESPIRATORY (INHALATION) at 05:07

## 2022-07-18 RX ADMIN — PROPOFOL 50 MG: 10 INJECTION, EMULSION INTRAVENOUS at 07:07

## 2022-07-18 RX ADMIN — PHENYLEPHRINE HYDROCHLORIDE 100 MCG: 10 INJECTION INTRAVENOUS at 07:07

## 2022-07-18 RX ADMIN — CEFAZOLIN 2 G: 330 INJECTION, POWDER, FOR SOLUTION INTRAMUSCULAR; INTRAVENOUS at 07:07

## 2022-07-18 RX ADMIN — ROCURONIUM BROMIDE 50 MG: 10 SOLUTION INTRAVENOUS at 07:07

## 2022-07-18 RX ADMIN — ROCURONIUM BROMIDE 10 MG: 10 SOLUTION INTRAVENOUS at 08:07

## 2022-07-18 RX ADMIN — PHENYLEPHRINE HYDROCHLORIDE 100 MCG: 10 INJECTION INTRAVENOUS at 09:07

## 2022-07-18 RX ADMIN — GLYCOPYRROLATE 0.8 MG: 0.2 INJECTION INTRAMUSCULAR; INTRAVENOUS at 10:07

## 2022-07-18 RX ADMIN — ROCURONIUM BROMIDE 20 MG: 10 SOLUTION INTRAVENOUS at 09:07

## 2022-07-18 RX ADMIN — PROPOFOL 150 MG: 10 INJECTION, EMULSION INTRAVENOUS at 07:07

## 2022-07-18 RX ADMIN — ROCURONIUM BROMIDE 20 MG: 10 SOLUTION INTRAVENOUS at 07:07

## 2022-07-18 RX ADMIN — MIDAZOLAM 2 MG: 1 INJECTION INTRAMUSCULAR; INTRAVENOUS at 06:07

## 2022-07-18 RX ADMIN — LIDOCAINE HYDROCHLORIDE 50 MG: 20 INJECTION, SOLUTION INTRAVENOUS at 07:07

## 2022-07-18 RX ADMIN — FENTANYL CITRATE 50 MCG: 50 INJECTION, SOLUTION INTRAMUSCULAR; INTRAVENOUS at 08:07

## 2022-07-18 RX ADMIN — PHENYLEPHRINE HYDROCHLORIDE 200 MCG: 10 INJECTION INTRAVENOUS at 08:07

## 2022-07-18 RX ADMIN — NEOSTIGMINE METHYLSULFATE 5 MG: 1 INJECTION INTRAVENOUS at 10:07

## 2022-07-18 RX ADMIN — SODIUM CHLORIDE, POTASSIUM CHLORIDE, SODIUM LACTATE AND CALCIUM CHLORIDE: 600; 310; 30; 20 INJECTION, SOLUTION INTRAVENOUS at 09:07

## 2022-07-18 RX ADMIN — SODIUM CHLORIDE, POTASSIUM CHLORIDE, SODIUM LACTATE AND CALCIUM CHLORIDE: 600; 310; 30; 20 INJECTION, SOLUTION INTRAVENOUS at 07:07

## 2022-07-18 NOTE — OP NOTE
[facility] - [facility]  Surgery Department  Operative Note    SUMMARY     Date of Procedure: 7/18/2022     Procedure: Procedure(s) (LRB):  EXCISION, LESION, FACE AND NECK (Bilateral)     Surgeon(s) and Role:     * Marcelo Capps MD - Primary     * Jamilah Marshall MD - Resident - Assisting        Pre-Operative Diagnosis: * Facial cysts  Post-Operative Diagnosis: Post-Op Diagnosis Codes:     * Sebaceous cysts    Anesthesia: General    Operative Findings (including complications, if any): Multiple (9) sebaceous cysts removed from left face and right neck    Description of Technical Procedures: Patient was brought into the operating room and placed in supine position. GET was induced. Planned incisions over cysts were marked and infiltrated with lidocaine with epinephrine. Patient was prepped and draped in standard fashion. Time out was completed with all in agreement of patient and procedure.   Attention was turned first to the largest left cheek cyst. An elliptical incision was made with a scalpel. Tenotomies were used to dissect around the capsule of the cyst. There was some spillage of cyst contents that was consistent with a sebaceous cyst. The cyst was removed and hemostasis achieved with bipolar cautery. Superiorly to the left cheek cyst inferior and lateral to the left eye two more sebaceous cysts were removed in similar fashion.   Attention was then turned to the neck where 3 separate incisions were made on the right and midline neck ot remove 6 more sebaceous cysts.  The wounds were irrigated thoroughly. Monocryl was used to close deeply and prolenes used to close the skin.  Patient was then returned to the care of anesthesia.     Estimated Blood Loss (EBL): 15cc           Implants: * No implants in log *    Specimens:   Specimen (24h ago, onward)             Start     Ordered    07/18/22 0815  Specimen to Pathology  RELEASE UPON ORDERING        References:    Click here for ordering Quick Tip    Question:  Release to patient  Answer:  Immediate    07/18/22 0815                        Condition: Good    Disposition: PACU - hemodynamically stable.     Addendum:  The lesions excised oral consistent grossly with epidermal inclusion cyst.  There were 3 lesions removed from the left side of the face including a 4.5 cm lesion, a 2.5 cm lesion, and a 1.5 cm lesion.  In the neck there were 6 lesions removed with 2 measuring 1.5 cm, 2 measuring 1.0 cm, 1 measuring 0.5 cm, and the last 1 measuring 2 cm.  Total wound length for the facial incisions closed was 7.0 cm.  For the neck total wound length was 6.0 cm.     Attestation: I was present and scrubbed for the entire procedure.

## 2022-07-18 NOTE — TRANSFER OF CARE
Anesthesia Transfer of Care Note    Patient: Arcadio Scott    Procedure(s) Performed: Procedure(s) (LRB):  EXCISION, LESION, FACE AND NECK (Bilateral)    Patient location: PACU    Anesthesia Type: general    Transport from OR: Transported from OR on room air with adequate spontaneous ventilation    Post pain: adequate analgesia    Post assessment: no apparent anesthetic complications    Post vital signs: stable    Level of consciousness: sedated    Nausea/Vomiting: no nausea/vomiting    Complications: none    Transfer of care protocol was followed      Last vitals:   99% spo2  148/84  18

## 2022-07-18 NOTE — DISCHARGE INSTRUCTIONS
SKIN LESION REMOVAL    · Keep follow up appointment at the [facility] on July 26th at 12:00pm.    · Apply Bacitracin ointment to incision sites twice daily.  No heavy lifting over 20lbs or straining for 1 week.    · You may take a shower tomorrow. GENTLY cleanse incision sites with soap and water  and gently pat dry.    · Do not soak your wound in water until cleared by MD. Do not take baths, swim, or use a hot tub until your doctor says it is okay.    · Use pain medication as instructed. Do not take Tylenol (acetaminophen) with your Norco  since Norco contains Tylenol as well. If you are experiencing severe pain even with your pain medications, please call the ENT clinic at [phone] to notify your doctor.    · You may use an ice pack as needed for 20 minutes at a time over your incision site to minimize swelling and help relieve pain.    · Do not drink alcohol or drive today, or as long as you are on pain medication.    · Notify MD of any moderate to severe pain unrelieved by pain medicine, if your incision opens and/or bleeds, or for any signs of infection including fever above 100.4, excessive redness or swelling, yellow/green foul- smelling drainage, nausea or vomiting. Clinics number is [phone]. If it is after business hours or emergency call [phone] and state Im having post op complications and need to speak to the ENT surgeon on call.    ·Thanks for choosing [facility]! Have a smooth recovery!

## 2022-07-18 NOTE — ANESTHESIA POSTPROCEDURE EVALUATION
Anesthesia Post Evaluation    Patient: Arcadio Scott    Procedure(s) Performed: Procedure(s) (LRB):  EXCISION, LESION, FACE AND NECK (Bilateral)    Final Anesthesia Type: general      Patient location during evaluation: DOSC  Level of consciousness: awake  Post-procedure vital signs: reviewed and stable  Airway patency: patent      Anesthetic complications: no      Cardiovascular status: hemodynamically stable  Respiratory status: spontaneous ventilation  Follow-up not needed.          Vitals Value Taken Time   /82 07/18/22 1040   Temp 36.7 °C (98.1 °F) 07/18/22 1040   Pulse 65 07/18/22 1040   Resp 12 07/18/22 1040   SpO2 95 % 07/18/22 1040         No case tracking events are documented in the log.      Pain/Rosanan Score: Rosanna Score: 10 (7/18/2022 10:45 AM)

## 2022-07-18 NOTE — ANESTHESIA PROCEDURE NOTES
Intubation    Date/Time: 7/18/2022 7:14 AM  Performed by: Sienna Sanchez CRNA  Authorized by: Deanna Gaona MD     Intubation:     Induction:  Intravenous    Intubated:  Postinduction    Mask Ventilation:  Easy mask    Attempts:  1    Attempted By:  CRNA    Method of Intubation:  Direct    Blade:  Ramirez 2    Laryngeal View Grade: Grade IIA - cords partially seen      Difficult Airway Encountered?: No      Complications:  None    Airway Device:  Oral endotracheal tube    Airway Device Size:  7.5    Style/Cuff Inflation:  Cuffed (inflated to minimal occlusive pressure)    Tube secured:  22    Secured at:  The lips    Placement Verified By:  Capnometry    Complicating Factors:  None    Findings Post-Intubation:  BS equal bilateral and atraumatic/condition of teeth unchanged

## 2022-07-18 NOTE — DISCHARGE SUMMARY
[facility]  Discharge Note  Short Stay    Procedure(s) (LRB):  EXCISION, LESION, FACE AND NECK (Bilateral)    OUTCOME: Patient tolerated treatment/procedure well without complication and is now ready for discharge.    DISPOSITION: Home or Self Care    FINAL DIAGNOSIS:  Sebaceous cysts    FOLLOWUP: In clinic    DISCHARGE INSTRUCTIONS:  No discharge procedures on file.     TIME SPENT ON DISCHARGE: 5 minutes

## 2022-07-18 NOTE — ADDENDUM NOTE
Addendum  created 07/18/22 1227 by Sienna Sanchez, CRNA    Child order released for a procedure order, Clinical Note Signed, Intraprocedure Blocks edited, LDA created via procedure documentation, SmartForm saved

## 2022-07-19 LAB
ESTROGEN SERPL-MCNC: NORMAL PG/ML
INSULIN SERPL-ACNC: NORMAL U[IU]/ML
LAB AP CLINICAL INFORMATION: NORMAL
LAB AP GROSS DESCRIPTION: NORMAL
LAB AP REPORT FOOTNOTES: NORMAL
T3RU NFR SERPL: NORMAL %

## 2022-07-26 ENCOUNTER — OFFICE VISIT (OUTPATIENT)
Dept: OTOLARYNGOLOGY | Facility: CLINIC | Age: 62
End: 2022-07-26
Payer: MEDICAID

## 2022-07-26 VITALS
OXYGEN SATURATION: 98 % | WEIGHT: 197.56 LBS | DIASTOLIC BLOOD PRESSURE: 75 MMHG | TEMPERATURE: 99 F | BODY MASS INDEX: 29.26 KG/M2 | HEART RATE: 74 BPM | SYSTOLIC BLOOD PRESSURE: 120 MMHG | HEIGHT: 69 IN

## 2022-07-26 DIAGNOSIS — L72.0 EPIDERMAL CYST OF FACE: Primary | ICD-10-CM

## 2022-07-26 DIAGNOSIS — L72.0 EPIDERMAL CYST OF NECK: ICD-10-CM

## 2022-07-26 PROBLEM — R22.0 FACIAL MASS: Status: RESOLVED | Noted: 2022-05-10 | Resolved: 2022-07-26

## 2022-07-26 PROCEDURE — 99213 OFFICE O/P EST LOW 20 MIN: CPT | Mod: PBBFAC | Performed by: OTOLARYNGOLOGY

## 2022-07-26 NOTE — PROGRESS NOTES
[facility]  Otolaryngology Clinic Note    Arcadio Scott  Encounter Date: 7/26/2022  YOB: 1960  Physician: Jamilah Marshall    Chief Complaint: Facial lesions    HPI: Arcadio Scott is a 61 y.o. male with multiple cysts of head and neck. Largest is on left cheek and been present for 4 years. Minimal growth, no pain. Had cysts on right cheek excised decades ago. No drainage from lesions.     7/13/22: Patient mistakenly booked for appointment today.     7/26/22: Here for suture removal s/p excision of multiple epidermal inclusion cysts 7/18/22. No issues.         ROS:   General: Negative except per HPI  Skin: Denies rash, ulcer, or lesion.  Eyes: Denies vision changes or diplopia.  Ears: Negative except per HPI  Nose: Negative except per HPI  Throat/mouth: Negative except per HPI  Cardiovascular: Negative except per HPI  Respiratory: Negative except per HPI  Neck: Negative except per HPI  Endocrine: Negative except per HPI  Neurologic: Negative except per HPI    Other 10-point review of systems negative except per HPI      Review of patient's allergies indicates:  No Known Allergies    History reviewed. No pertinent past medical history.    Past Surgical History:   Procedure Laterality Date    CYST REMOVAL      Facial    SURGICAL REMOVAL OF LESION OF FACE Bilateral 7/18/2022    Procedure: EXCISION, LESION, FACE AND NECK;  Surgeon: Marcelo Capps MD;  Location: [facility];  Service: ENT;  Laterality: Bilateral;       Social History     Socioeconomic History    Marital status: Single   Tobacco Use    Smoking status: Current Some Day Smoker     Types: Cigars    Smokeless tobacco: Never Used    Tobacco comment: 1 or 2 cigars a week   Substance and Sexual Activity    Alcohol use: Yes     Alcohol/week: 1.0 standard drink     Types: 1 Cans of beer per week     Comment: every now and then    Drug use: Never       Family History   Problem Relation Age of Onset    Cancer Father          "Unsure of source    Diabetes Mother     Heart disease Mother     Aneurysm Sister     Diabetes Brother     Stomach cancer Brother        Outpatient Encounter Medications as of 7/26/2022   Medication Sig Dispense Refill    famotidine (PEPCID) 40 MG tablet Take 40 mg by mouth.      gabapentin (NEURONTIN) 100 MG capsule Take 200 mg by mouth.      HYDROcodone-acetaminophen (NORCO) 5-325 mg per tablet Take 1 tablet by mouth every 6 (six) hours as needed for Pain. 14 tablet 0     Facility-Administered Encounter Medications as of 7/26/2022   Medication Dose Route Frequency Provider Last Rate Last Admin    LIDOcaine (PF) 10 mg/ml (1%) injection 10 mg  1 mL Intradermal Once AN [place]           Physical Exam:  Vitals:    07/26/22 1203   BP: 120/75   BP Location: Right arm   Patient Position: Sitting   BP Method: Small (Automatic)   Pulse: 74   Temp: 99.1 °F (37.3 °C)   TempSrc: Oral   SpO2: 98%   Weight: 89.6 kg (197 lb 8.5 oz)   Height: 5' 9" (1.753 m)       Constitutional  General Appearance: well nourished, well-developed, AAO x3, NAD  HEENT  Face: incisions c/d/i prolenes in place            Eyes: PEERLA, EOMI, normal conjunctivae  Ears: Hearing well at conversation level; lobo - no lateralization, Rinne AC > BC   AD: auricle normal, EAC normal, TM intact, no OBIE   AS: auricle normal, EAC normal, TM intact, no OBIE   Vestibular: ambulates without gait disturbance  Nose: septum midline, no inferior turbinate hypertrophy, no polyps, moist mucosa without erythema or blue hue  OC/OP: dentition moderate, no oral lesions, tongue/FOM/BOT- soft, no leukoplakia/ulcerations/ tenderness  Nasopharynx, Hypopharynx, and Larynx:    Indirect: attempted, limited view due to patient intolerance  Neck: soft, non-tender, no palpable lymph nodes   Thyroid region- no nodules or goiter  Skin tag/cyst  Neuro: CN II - XII intact bilaterally  Cardiovascular: peripheral pulses palpable  Respiratory: non-labored " respirations  Psychiatric: oriented to time, place and person, no depression, anxiety or agitation      Pertinent Data:  ? LABS:  ? AUDIO:  ? CT Scan:  MRI:         Imaging:   I personally reviewed the following images:    Summary of Outside Records:      Assessment/Plan:  Arcadio Scott is a 61 y.o. male with numerous facial cysts s/p excision 7/18/22. Path all epidermal inclusion cysts.      -Sutures removed today. Patient highly anxious and very difficult to remove sutures, advise in the future that if absorbable sutures are possible to use those.   - RTC 2-3 weeks for wound check  - Wound care instructions given    Jamilah Marshall MD  [facility] Department of Otolaryngology  -

## 2022-08-01 ENCOUNTER — OFFICE VISIT (OUTPATIENT)
Dept: INTERNAL MEDICINE | Facility: CLINIC | Age: 62
End: 2022-08-01
Payer: MEDICAID

## 2022-08-01 VITALS
SYSTOLIC BLOOD PRESSURE: 133 MMHG | BODY MASS INDEX: 28.41 KG/M2 | RESPIRATION RATE: 20 BRPM | HEIGHT: 69 IN | HEART RATE: 89 BPM | TEMPERATURE: 98 F | WEIGHT: 191.81 LBS | DIASTOLIC BLOOD PRESSURE: 72 MMHG

## 2022-08-01 DIAGNOSIS — M54.9 DORSALGIA, UNSPECIFIED: ICD-10-CM

## 2022-08-01 DIAGNOSIS — M54.41 CHRONIC BILATERAL LOW BACK PAIN WITH BILATERAL SCIATICA: ICD-10-CM

## 2022-08-01 DIAGNOSIS — G62.9 PERIPHERAL POLYNEUROPATHY: ICD-10-CM

## 2022-08-01 DIAGNOSIS — M54.42 CHRONIC BILATERAL LOW BACK PAIN WITH BILATERAL SCIATICA: ICD-10-CM

## 2022-08-01 DIAGNOSIS — G89.29 CHRONIC BILATERAL LOW BACK PAIN WITH BILATERAL SCIATICA: ICD-10-CM

## 2022-08-01 DIAGNOSIS — Z00.00 WELLNESS EXAMINATION: ICD-10-CM

## 2022-08-01 PROCEDURE — 3078F DIAST BP <80 MM HG: CPT | Mod: CPTII,,, | Performed by: NURSE PRACTITIONER

## 2022-08-01 PROCEDURE — 99214 OFFICE O/P EST MOD 30 MIN: CPT | Mod: S$PBB,,, | Performed by: NURSE PRACTITIONER

## 2022-08-01 PROCEDURE — 99214 PR OFFICE/OUTPT VISIT, EST, LEVL IV, 30-39 MIN: ICD-10-PCS | Mod: S$PBB,,, | Performed by: NURSE PRACTITIONER

## 2022-08-01 PROCEDURE — 3008F BODY MASS INDEX DOCD: CPT | Mod: CPTII,,, | Performed by: NURSE PRACTITIONER

## 2022-08-01 PROCEDURE — 3075F SYST BP GE 130 - 139MM HG: CPT | Mod: CPTII,,, | Performed by: NURSE PRACTITIONER

## 2022-08-01 PROCEDURE — 3078F PR MOST RECENT DIASTOLIC BLOOD PRESSURE < 80 MM HG: ICD-10-PCS | Mod: CPTII,,, | Performed by: NURSE PRACTITIONER

## 2022-08-01 PROCEDURE — 1159F PR MEDICATION LIST DOCUMENTED IN MEDICAL RECORD: ICD-10-PCS | Mod: CPTII,,, | Performed by: NURSE PRACTITIONER

## 2022-08-01 PROCEDURE — 3075F PR MOST RECENT SYSTOLIC BLOOD PRESS GE 130-139MM HG: ICD-10-PCS | Mod: CPTII,,, | Performed by: NURSE PRACTITIONER

## 2022-08-01 PROCEDURE — 3008F PR BODY MASS INDEX (BMI) DOCUMENTED: ICD-10-PCS | Mod: CPTII,,, | Performed by: NURSE PRACTITIONER

## 2022-08-01 PROCEDURE — 99214 OFFICE O/P EST MOD 30 MIN: CPT | Mod: PBBFAC | Performed by: NURSE PRACTITIONER

## 2022-08-01 PROCEDURE — 1159F MED LIST DOCD IN RCRD: CPT | Mod: CPTII,,, | Performed by: NURSE PRACTITIONER

## 2022-08-01 RX ORDER — FAMOTIDINE 40 MG/1
40 TABLET, FILM COATED ORAL NIGHTLY PRN
Qty: 90 TABLET | Refills: 1 | Status: ON HOLD | OUTPATIENT
Start: 2022-08-01 | End: 2023-09-29 | Stop reason: HOSPADM

## 2022-08-01 NOTE — ASSESSMENT & PLAN NOTE
Check CBC, CMP, TSH, sed rate, VB12, Folate  Drinks 3 or more beers a day, enc cessation  A1c 5.1% (2/14/22)  TSH 0.5815 (2/14/22)  Refer for EMG

## 2022-08-01 NOTE — PROGRESS NOTES
"  AN Mckeon   [facility]  [address] [facility] [address]      PATIENT NAME: Arcadio Scott  : 1960  DATE: 22  MRN: [id number]      Billing Provider: AN Mckeon  Level of Service:   Patient PCP Information     Provider PCP Type    AN Mckeon General          Reason for Visit / Chief Complaint: Follow-up and Numbness (Bilateral feet numbness)       History of Present Illness / Problem Focused Workflow     Arcadio Scott presents to the clinic with Follow-up and Numbness (Bilateral feet numbness)     Initial Visit 21: 61 y.o. AA male presenting to [facility] to establish primary care.   Previous PCP: States previous pt of Dr. Sanjuana Briones. Last seen in the . States "I was never sick!"   PmHx: Alcoholic gastritis, Sciatica, left leg pain, past TBO (stopped )   SHx: Denies   FHx: Lung cancer (dad), heart dz   Complaints today: Establish primary care. Denies any significant PMHx, alleging "I never get sick." Has had ED visits over the past few years for LE pain, sciatica. He did have an ED visit 10/2021 2/2 abd pain and vomiting. He was diagnosed with alcoholic gastritis at that visit. He admits ETOH use but no "hard liquor" since ED visit. States drinks ~1 bottle of wine of 6-pack of beer per week. He reports "pins and needles" sensation to left lower ext following a slip and fall incident while fishing 4 years ago. Also c/o mass to left face x 2 years that he wants removed. Denies ever undergoing colon cancer or prostate cancer screening. Father had lung cancer but was a smoker.    Telephone Visit (22): Pt presenting for f/u today. He was seen for an initial visit 21. He did not complete any wellness labs. H/o +FIT. Referred to GI lab 2021. He's since undergone a colonoscopy 2022 that revealed mx polyps (7 removed). No evidence of malignancy. Plans to repeat colonoscopy in 1 year. He is c/o "my " "leg still numb." These complaints were addressed at last visit. Pt was referred for an arterial US of LLE. Scheduled 3/17/22. Pt states not aware of appt, but wrote down appt details. He has no other concerns.    (2/16/22): Pt presenting for f/u today. He was seen for an initial visit 12/6/21. He completed wellness labs on Monday. Labs significant for: K+ 3.3. Pt reports experiencing some nausea and vomiting the day before. He has a h/o alcoholic gastritis. Takes Pepcid and Zofran as needed. He denies recent ETOH use, but admits a beer "here and there." Glucose one-teens and total cholesterol 230. He was not fasting. States ate some deer meat ~4 hrs prior to lab analysis. HgA1c 5.1%, WNL. PSA 0.50. TSH WNL. Platelet count improved. Denies abnl bleeding or bruising. Hepatitis panel and HIV screening negative. Syphilis ab reactive with a nonreactive RPR. Pt reports being diagnosed around 2009 and treated with pills under Dr. Briones. He is aware of appt for arterial US on 3/17/22. No other concerns.    (4/4/22): Pt presenting for f/u today. He had an arterial US of the BLE due to c/o LE paresthesias on 3/17/22 revealing: "The Doppler waveforms were triphasic at the right ankle.  The BG on the right is normal.  The TBI on the right is normal.  The Doppler waveforms were triphasic at the left ankle.  The BG on the left is normal.  The TBI on the left is normal.  No evidence of significant arterial insufficiency was identified on this study. The post exercise BG study was omitted due to impaired mobility."   Today, pt reports sharp, shooting pain starts in lower back kayleigh and radiates down to feet. States sometimes can't feel anything on his toes entirely. The only injury he recalls is a fall off of an excavator ~8 months ago where he landed on his right side. States didn't seek medical attention because he did not deem the fall was "bad." As time went on, he started to experience discomfort to his right lower back. He " "now ambulates with a walking cane. c/o paresthesias to BLE. c/o ED; states has not had intercourse in 8 mths. Denies B/B incontinence. He is working on obtaining STD as he has not been able to return to work due to impaired mobility and lower back pain with paresthesias. He has seen [facility] for mx nodules to face. He's been referred to Plastic Sx/Sx clinic for eval. No other concerns.    Today's Visit (8/1/22): Pt presenting for f/u lower back pain with paresthesias to BLE/toes as above. At last visit, this complaint was discussed entirely. Past arterial US normal. He was started on Gabapentin in April 2022, then referred to undergo a MRI of L spine. This was ordered but never done. He was taking Gabapentin with minimal improvement. Informed patient that I was contacted by an occupational health provider after last visit regarding Gabapentin. Patient was being evaluated for a position. I was informed that patient passed the physical exam portion of his work-up, but the doctor later found he'd been prescribed Gabapentin. He would not be able to be approved for the position while on the Gabapentin. The physician was to speak with the patient regarding whether or not he would want to continue treatment with it. Pt states he did go for a physical and passed most of the exam, but is on hold due to "they found blood in my urine on my drug test." He's had trace RBCs on UA 10/13/21 and 5/26/21. Denies gross hematuria. Overall, he admits that he's been trying to "exercise more" by doing some household chores such as cleaning inside and mowing his lawn. Admits no longer needs to use a walking cane. No other concerns today.       Review of Systems     Review of Systems   Musculoskeletal: Positive for back pain.   Neurological: Positive for numbness.   All other systems reviewed and are negative.      Medical / Social / Family History   History reviewed. No pertinent past medical history.    Past Surgical History: "   Procedure Laterality Date    COLONOSCOPY W/ POLYPECTOMY  01/28/2022    CYST REMOVAL      Facial    SURGICAL REMOVAL OF LESION OF FACE Bilateral 07/18/2022    Procedure: EXCISION, LESION, FACE AND NECK;  Surgeon: Marcelo Capps MD;  Location: [facility];  Service: ENT;  Laterality: Bilateral;       Social History    reports that he has been smoking cigars. He has never used smokeless tobacco. He reports current alcohol use of about 1.0 standard drink of alcohol per week. He reports that he does not use drugs.    Family History  's family history includes Aneurysm in his sister; Cancer in his father; Diabetes in his brother and mother; Heart disease in his mother; Stomach cancer in his brother.    Medications and Allergies     Medications  Medication List with Changes/Refills   Changed and/or Refilled Medications    Modified Medication Previous Medication    FAMOTIDINE (PEPCID) 40 MG TABLET famotidine (PEPCID) 40 MG tablet       Take 1 tablet (40 mg total) by mouth nightly as needed for Heartburn.    Take 40 mg by mouth.   Discontinued Medications    GABAPENTIN (NEURONTIN) 100 MG CAPSULE    Take 200 mg by mouth.    HYDROCODONE-ACETAMINOPHEN (NORCO) 5-325 MG PER TABLET    Take 1 tablet by mouth every 6 (six) hours as needed for Pain.       Allergies  Review of patient's allergies indicates:  No Known Allergies    Physical Examination     Vitals:    08/01/22 1241   BP: 133/72   Pulse: 89   Resp: 20   Temp: 98.3 °F (36.8 °C)     Physical Exam  Constitutional:       Appearance: Normal appearance.   HENT:      Head: Normocephalic and atraumatic.   Cardiovascular:      Rate and Rhythm: Normal rate and regular rhythm.      Pulses: Normal pulses.      Heart sounds: Normal heart sounds.   Pulmonary:      Effort: Pulmonary effort is normal.      Breath sounds: Normal breath sounds.   Abdominal:      General: Bowel sounds are normal.      Palpations: Abdomen is soft.   Musculoskeletal:         General: Normal range of  motion.      Cervical back: Normal range of motion.   Skin:     General: Skin is warm and dry.   Neurological:      General: No focal deficit present.      Mental Status: He is alert and oriented to person, place, and time.   Psychiatric:         Mood and Affect: Mood normal.         Behavior: Behavior normal.         Thought Content: Thought content normal.         Judgment: Judgment normal.           Results     Lab Results   Component Value Date    WBC 5.1 02/14/2022    RBC 4.80 02/14/2022    HGB 14.5 02/14/2022    HCT 43.1 02/14/2022    MCV 89.8 02/14/2022    MCH 30.2 02/14/2022    MCHC 33.6 02/14/2022    RDW 14.4 02/14/2022     02/14/2022    MPV 11.0 02/14/2022     CMP  Sodium Level   Date Value Ref Range Status   02/14/2022 138 136 - 145      Potassium Level   Date Value Ref Range Status   02/14/2022 3.3 3.5 - 5.1      Carbon Dioxide   Date Value Ref Range Status   02/14/2022 26 23 - 31      Blood Urea Nitrogen   Date Value Ref Range Status   02/14/2022 7.6 8.4 - 25.7      Creatinine   Date Value Ref Range Status   02/14/2022 0.77 0.73 - 1.18      Calcium Level Total   Date Value Ref Range Status   02/14/2022 9.3 8.7 - 10.5      Albumin Level   Date Value Ref Range Status   02/14/2022 4.2 3.4 - 4.8      Bilirubin Total   Date Value Ref Range Status   02/14/2022 0.9 <=1.5      Alkaline Phosphatase   Date Value Ref Range Status   02/14/2022 80 40 - 150      Aspartate Aminotransferase   Date Value Ref Range Status   02/14/2022 32 5 - 34      Alanine Aminotransferase   Date Value Ref Range Status   02/14/2022 8 0 - 55      Estimated GFR-Non    Date Value Ref Range Status   02/14/2022 >105 >=90      Lab Results   Component Value Date    CHOL 230 02/14/2022     Lab Results   Component Value Date    HDL 73 02/14/2022     No results found for: LDLCALC  Lab Results   Component Value Date    TRIG 107 02/14/2022     No results found for: CHOLHDL  Lab Results   Component Value Date    TSH 0.5815  "02/14/2022     Lab Results   Component Value Date    PHUR 6.0 05/26/2021    PROTEINUA 100 (A) 05/26/2021    GLUCUA Negative 05/26/2021    KETONESU Negative 05/26/2021    OCCULTUA 0.1 05/26/2021    NITRITE Negative 05/26/2021    LEUKOCYTESUR Negative 05/26/2021           Assessment and Plan (including Health Maintenance)     Plan:         Health Maintenance Due   Topic Date Due    Hepatitis C Screening  Never done    COVID-19 Vaccine (1) Never done    Pneumococcal Vaccines (Age 0-64) (1 - PCV) Never done    TETANUS VACCINE  Never done    Colorectal Cancer Screening  Never done    Shingles Vaccine (1 of 2) Never done       Problem List Items Addressed This Visit        Neuro    Peripheral polyneuropathy    Current Assessment & Plan     Check CBC, CMP, TSH, sed rate, VB12, Folate  Drinks 3 or more beers a day, enc cessation  A1c 5.1% (2/14/22)  TSH 0.5815 (2/14/22)  Refer for EMG           Relevant Orders    CBC Auto Differential    Comprehensive Metabolic Panel    TSH    Sedimentation rate    Vitamin B12    Ambulatory referral/consult to Neurology    Folate       Orthopedic    Chronic bilateral low back pain with bilateral sciatica    Current Assessment & Plan     See "peripheral neuropathy."  Reordering MRI L Spine           Relevant Orders    MRI Lumbar Spine Without Contrast       Other    Wellness examination    Overview     Colonoscopy 1/28/2022 that revealed mx polyps (7 removed). No evidence of malignancy. Plans to repeat colonoscopy in 1 year  PSA 0.50, 2/14/22             Other Visit Diagnoses     Dorsalgia, unspecified              Health Maintenance Topics with due status: Not Due       Topic Last Completion Date    Lipid Panel 02/14/2022    Influenza Vaccine Not Due       Future Appointments   Date Time Provider Department Center   8/17/2022  2:00 PM RESIDENT 1, [facility] OTORHINOLARYNGOLOGY [facility] [place]   8/25/2022 12:00 PM AN Mckeon [facility] [place]   9/13/2022 12:15 PM Haseeb" AN [facility]            Signature:  AN Mckeon  [facility]  [address] [facility] [address]    Date of encounter: 8/1/22

## 2022-08-16 ENCOUNTER — TELEPHONE (OUTPATIENT)
Dept: ADMINISTRATIVE | Facility: HOSPITAL | Age: 62
End: 2022-08-16
Payer: MEDICAID

## 2022-08-16 DIAGNOSIS — G60.8 PERIPHERAL SENSORY-MOTOR AXONAL POLYNEUROPATHY: Primary | ICD-10-CM

## 2022-08-16 NOTE — TELEPHONE ENCOUNTER
Please inform EMG with Dr. 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 I'm referring to Neuro.

## 2022-10-31 PROBLEM — Z00.00 WELLNESS EXAMINATION: Status: RESOLVED | Noted: 2022-08-01 | Resolved: 2022-10-31

## 2022-12-08 ENCOUNTER — OFFICE VISIT (OUTPATIENT)
Dept: INTERNAL MEDICINE | Facility: CLINIC | Age: 62
End: 2022-12-08
Payer: MEDICAID

## 2022-12-08 VITALS
BODY MASS INDEX: 29.68 KG/M2 | HEART RATE: 66 BPM | DIASTOLIC BLOOD PRESSURE: 86 MMHG | TEMPERATURE: 98 F | SYSTOLIC BLOOD PRESSURE: 148 MMHG | WEIGHT: 200.38 LBS | HEIGHT: 69 IN | RESPIRATION RATE: 20 BRPM

## 2022-12-08 DIAGNOSIS — Z12.5 SCREENING FOR PROSTATE CANCER: Primary | ICD-10-CM

## 2022-12-08 DIAGNOSIS — Z13.1 SCREENING FOR DIABETES MELLITUS: ICD-10-CM

## 2022-12-08 DIAGNOSIS — G62.9 PERIPHERAL POLYNEUROPATHY: ICD-10-CM

## 2022-12-08 DIAGNOSIS — Z00.00 WELLNESS EXAMINATION: ICD-10-CM

## 2022-12-08 DIAGNOSIS — Z02.89 ENCOUNTER FOR COMPLETION OF FORM WITH PATIENT: ICD-10-CM

## 2022-12-08 DIAGNOSIS — R03.0 ELEVATED BLOOD PRESSURE READING: ICD-10-CM

## 2022-12-08 DIAGNOSIS — Z11.3 ROUTINE SCREENING FOR STI (SEXUALLY TRANSMITTED INFECTION): ICD-10-CM

## 2022-12-08 PROCEDURE — 1160F PR REVIEW ALL MEDS BY PRESCRIBER/CLIN PHARMACIST DOCUMENTED: ICD-10-PCS | Mod: CPTII,,, | Performed by: NURSE PRACTITIONER

## 2022-12-08 PROCEDURE — 3077F SYST BP >= 140 MM HG: CPT | Mod: CPTII,,, | Performed by: NURSE PRACTITIONER

## 2022-12-08 PROCEDURE — 1160F RVW MEDS BY RX/DR IN RCRD: CPT | Mod: CPTII,,, | Performed by: NURSE PRACTITIONER

## 2022-12-08 PROCEDURE — 3077F PR MOST RECENT SYSTOLIC BLOOD PRESSURE >= 140 MM HG: ICD-10-PCS | Mod: CPTII,,, | Performed by: NURSE PRACTITIONER

## 2022-12-08 PROCEDURE — 1159F MED LIST DOCD IN RCRD: CPT | Mod: CPTII,,, | Performed by: NURSE PRACTITIONER

## 2022-12-08 PROCEDURE — 99214 OFFICE O/P EST MOD 30 MIN: CPT | Mod: PBBFAC | Performed by: NURSE PRACTITIONER

## 2022-12-08 PROCEDURE — 3008F BODY MASS INDEX DOCD: CPT | Mod: CPTII,,, | Performed by: NURSE PRACTITIONER

## 2022-12-08 PROCEDURE — 1159F PR MEDICATION LIST DOCUMENTED IN MEDICAL RECORD: ICD-10-PCS | Mod: CPTII,,, | Performed by: NURSE PRACTITIONER

## 2022-12-08 PROCEDURE — 3008F PR BODY MASS INDEX (BMI) DOCUMENTED: ICD-10-PCS | Mod: CPTII,,, | Performed by: NURSE PRACTITIONER

## 2022-12-08 PROCEDURE — 99396 PR PREVENTIVE VISIT,EST,40-64: ICD-10-PCS | Mod: S$PBB,,, | Performed by: NURSE PRACTITIONER

## 2022-12-08 PROCEDURE — 3079F DIAST BP 80-89 MM HG: CPT | Mod: CPTII,,, | Performed by: NURSE PRACTITIONER

## 2022-12-08 PROCEDURE — 3079F PR MOST RECENT DIASTOLIC BLOOD PRESSURE 80-89 MM HG: ICD-10-PCS | Mod: CPTII,,, | Performed by: NURSE PRACTITIONER

## 2022-12-08 PROCEDURE — 99396 PREV VISIT EST AGE 40-64: CPT | Mod: S$PBB,,, | Performed by: NURSE PRACTITIONER

## 2022-12-08 NOTE — PROGRESS NOTES
"  AN Mckeon   [facility]  [address] [facility] [address]      PATIENT NAME: Arcadio Scott  : 1960  DATE: 22  MRN: [id number]      Billing Provider: AN Mckeon  Level of Service:   Patient PCP Information       Provider PCP Type    AN Mckeon General            Reason for Visit / Chief Complaint: Letter for School/Work       History of Present Illness / Problem Focused Workflow     Arcadio Scott presents to the clinic with Letter for School/Work     Initial Visit 21: 61 y.o. AA male presenting to [facility] to establish primary care.   Previous PCP: States previous pt of Dr. Sanjuana Briones. Last seen in the . States "I was never sick!"   PmHx: Alcoholic gastritis, Sciatica, left leg pain, past TBO (stopped )   SHx: Denies   FHx: Lung cancer (dad), heart dz   Complaints today: Establish primary care. Denies any significant PMHx, alleging "I never get sick." Has had ED visits over the past few years for LE pain, sciatica. He did have an ED visit 10/2021 2/2 abd pain and vomiting. He was diagnosed with alcoholic gastritis at that visit. He admits ETOH use but no "hard liquor" since ED visit. States drinks ~1 bottle of wine of 6-pack of beer per week. He reports "pins and needles" sensation to left lower ext following a slip and fall incident while fishing 4 years ago. Also c/o mass to left face x 2 years that he wants removed. Denies ever undergoing colon cancer or prostate cancer screening. Father had lung cancer but was a smoker.    Telephone Visit (22): Pt presenting for f/u today. He was seen for an initial visit 21. He did not complete any wellness labs. H/o +FIT. Referred to GI lab 2021. He's since undergone a colonoscopy 2022 that revealed mx polyps (7 removed). No evidence of malignancy. Plans to repeat colonoscopy in 1 year. He is c/o "my leg still numb." These complaints were " "addressed at last visit. Pt was referred for an arterial US of LLE. Scheduled 3/17/22. Pt states not aware of appt, but wrote down appt details. He has no other concerns.    (2/16/22): Pt presenting for f/u today. He was seen for an initial visit 12/6/21. He completed wellness labs on Monday. Labs significant for: K+ 3.3. Pt reports experiencing some nausea and vomiting the day before. He has a h/o alcoholic gastritis. Takes Pepcid and Zofran as needed. He denies recent ETOH use, but admits a beer "here and there." Glucose one-teens and total cholesterol 230. He was not fasting. States ate some deer meat ~4 hrs prior to lab analysis. HgA1c 5.1%, WNL. PSA 0.50. TSH WNL. Platelet count improved. Denies abnl bleeding or bruising. Hepatitis panel and HIV screening negative. Syphilis ab reactive with a nonreactive RPR. Pt reports being diagnosed around 2009 and treated with pills under Dr. Briones. He is aware of appt for arterial US on 3/17/22. No other concerns.    (4/4/22): Pt presenting for f/u today. He had an arterial US of the BLE due to c/o LE paresthesias on 3/17/22 revealing: "The Doppler waveforms were triphasic at the right ankle.  The BG on the right is normal.  The TBI on the right is normal.  The Doppler waveforms were triphasic at the left ankle.  The BG on the left is normal.  The TBI on the left is normal.  No evidence of significant arterial insufficiency was identified on this study. The post exercise BG study was omitted due to impaired mobility."   Today, pt reports sharp, shooting pain starts in lower back kayleigh and radiates down to feet. States sometimes can't feel anything on his toes entirely. The only injury he recalls is a fall off of an excavator ~8 months ago where he landed on his right side. States didn't seek medical attention because he did not deem the fall was "bad." As time went on, he started to experience discomfort to his right lower back. He now ambulates with a walking cane. c/o " "paresthesias to BLE. c/o ED; states has not had intercourse in 8 mths. Denies B/B incontinence. He is working on obtaining STD as he has not been able to return to work due to impaired mobility and lower back pain with paresthesias. He has seen [facility] clinic for mx nodules to face. He's been referred to Plastic Sx/Sx clinic for eval. No other concerns.    Today's Visit (8/1/22): Pt presenting for f/u lower back pain with paresthesias to BLE/toes as above. At last visit, this complaint was discussed entirely. Past arterial US normal. He was started on Gabapentin in April 2022, then referred to undergo a MRI of L spine. This was ordered but never done. He was taking Gabapentin with minimal improvement. Informed patient that I was contacted by an occupational health provider after last visit regarding Gabapentin. Patient was being evaluated for a position. I was informed that patient passed the physical exam portion of his work-up, but the doctor later found he'd been prescribed Gabapentin. He would not be able to be approved for the position while on the Gabapentin. The physician was to speak with the patient regarding whether or not he would want to continue treatment with it. Pt states he did go for a physical and passed most of the exam, but is on hold due to "they found blood in my urine on my drug test." He's had trace RBCs on UA 10/13/21 and 5/26/21. Denies gross hematuria. Overall, he admits that he's been trying to "exercise more" by doing some household chores such as cleaning inside and mowing his lawn. Admits no longer needs to use a walking cane. No other concerns today.     12/8/22: Patient presents today after mx missed appts. States he's trying to received community assistance for rent payment from [employer] but needs a letter stating he's being followed by a doctor. He also talked about wanting to go back to work. 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 He did pass a physical exam at the time, but was taking Gabapentin which excluded him from the position if he'd decided to stay on such. He is no longer taking Gabapentin. Previously referred to Dr. London for an EMG. EMG 8/12/22; diagnosed with sensory axonal polyneuropathy of both legs. He did get a script for Lyrica ("for my foot") from Dr. London but doesn't take regularly. He is no longer ambulatory with cane. He finished P.T. w/ Michelle in September. Bp slightly elevated. Asymptomatic. No other concerns.    Follow-up      Review of Systems     Review of Systems   All other systems reviewed and are negative.    Medical / Social / Family History   History reviewed. No pertinent past medical history.    Past Surgical History:   Procedure Laterality Date    COLONOSCOPY W/ POLYPECTOMY  01/28/2022    CYST REMOVAL      Facial    SURGICAL REMOVAL OF LESION OF FACE Bilateral 07/18/2022    Procedure: EXCISION, LESION, FACE AND NECK;  Surgeon: Marcelo Capps MD;  Location: [facility];  Service: ENT;  Laterality: Bilateral;       Social History    reports that he has quit smoking. His smoking use included cigars. He has never used smokeless tobacco. He reports current alcohol use of about 1.0 standard drink per week. He reports that he does not use drugs.    Family History  's family history includes Alzheimer's disease in his maternal grandmother; Aneurysm in his sister; Cancer in his father; Diabetes in his brother and mother; Heart disease in his mother; Stomach cancer in his brother.    Medications and Allergies     Medications  Medication List with Changes/Refills   Current Medications    FAMOTIDINE (PEPCID) 40 MG TABLET    Take 1 tablet (40 mg total) by mouth nightly as needed for Heartburn.    PREGABALIN (LYRICA) 50 MG CAPSULE    Take 50 mg by mouth 2 (two) times daily.       Allergies  Review of patient's allergies indicates:  No Known Allergies    Physical Examination     Vitals:    12/08/22 1507 "   BP: (!) 148/86   Pulse:    Resp:    Temp:      Physical Exam  Constitutional:       Appearance: Normal appearance.   HENT:      Head: Normocephalic and atraumatic.   Cardiovascular:      Rate and Rhythm: Normal rate and regular rhythm.      Pulses: Normal pulses.      Heart sounds: Normal heart sounds.   Pulmonary:      Effort: Pulmonary effort is normal.      Breath sounds: Normal breath sounds.   Abdominal:      General: Bowel sounds are normal.      Palpations: Abdomen is soft.   Musculoskeletal:         General: Normal range of motion.      Cervical back: Normal range of motion.      Right lower leg: No edema.      Left lower leg: No edema.   Skin:     General: Skin is warm and dry.   Neurological:      General: No focal deficit present.      Mental Status: He is alert and oriented to person, place, and time.      Motor: No weakness.      Gait: Gait normal.   Psychiatric:         Mood and Affect: Mood normal.         Behavior: Behavior normal.         Thought Content: Thought content normal.         Judgment: Judgment normal.         Results     Lab Results   Component Value Date    WBC 5.1 02/14/2022    RBC 4.80 02/14/2022    HGB 14.5 02/14/2022    HCT 43.1 02/14/2022    MCV 89.8 02/14/2022    MCH 30.2 02/14/2022    MCHC 33.6 02/14/2022    RDW 14.4 02/14/2022     02/14/2022    MPV 11.0 02/14/2022     CMP  Sodium Level   Date Value Ref Range Status   02/14/2022 138 136 - 145      Potassium Level   Date Value Ref Range Status   02/14/2022 3.3 3.5 - 5.1      Carbon Dioxide   Date Value Ref Range Status   02/14/2022 26 23 - 31      Blood Urea Nitrogen   Date Value Ref Range Status   02/14/2022 7.6 8.4 - 25.7      Creatinine   Date Value Ref Range Status   02/14/2022 0.77 0.73 - 1.18      Calcium Level Total   Date Value Ref Range Status   02/14/2022 9.3 8.7 - 10.5      Albumin Level   Date Value Ref Range Status   02/14/2022 4.2 3.4 - 4.8      Bilirubin Total   Date Value Ref Range Status   02/14/2022 0.9  <=1.5      Alkaline Phosphatase   Date Value Ref Range Status   02/14/2022 80 40 - 150      Aspartate Aminotransferase   Date Value Ref Range Status   02/14/2022 32 5 - 34      Alanine Aminotransferase   Date Value Ref Range Status   02/14/2022 8 0 - 55      Estimated GFR-Non    Date Value Ref Range Status   02/14/2022 >105 >=90      Lab Results   Component Value Date    CHOL 230 02/14/2022     Lab Results   Component Value Date    HDL 73 02/14/2022     No results found for: LDLCALC  Lab Results   Component Value Date    TRIG 107 02/14/2022     No results found for: CHOLHDL  Lab Results   Component Value Date    TSH 0.5815 02/14/2022     Lab Results   Component Value Date    PHUR 6.0 10/13/2021    PROTEINUA >600 (A) 10/13/2021    GLUCUA 50 (A) 10/13/2021    KETONESU 100 (A) 10/13/2021    OCCULTUA 0.5 10/13/2021    NITRITE Negative 10/13/2021    LEUKOCYTESUR Negative 10/13/2021           Assessment and Plan (including Health Maintenance)     Plan:         Health Maintenance Due   Topic Date Due    Hepatitis C Screening  Never done    COVID-19 Vaccine (1) Never done    TETANUS VACCINE  Never done    Colorectal Cancer Screening  Never done    Shingles Vaccine (1 of 2) Never done    Influenza Vaccine (1) Never done       Problem List Items Addressed This Visit          Neuro    Peripheral polyneuropathy    Current Assessment & Plan     He did undergo EMG 8/12/22 and was diagnosed with sensory axonal polyneuropathy of both legs. He received Lyrica from Dr. London but not needing regularly. Overall, sx improved            Cardiac/Vascular    Elevated blood pressure reading    Current Assessment & Plan     Educated on aerobic exercise (3-5 days/week) and a low-fat, low-sodium diet  Avoid excess ETOH consumption. Smoking cessation if applicable  ED precautions (s/s of CVA, etc)  RTC in 2 weeks to re-check BP              Other    Encounter for completion of form with patient     Other Visit Diagnoses        Screening for prostate cancer    -  Primary    Relevant Orders    PSA, Screening    Wellness examination        Relevant Orders    Urinalysis, Reflex to Urine Culture Urine, Clean Catch    TSH    Lipid Panel    Comprehensive Metabolic Panel    CBC Auto Differential    Routine screening for STI (sexually transmitted infection)        Relevant Orders    Hepatitis Panel, Acute    Screening for diabetes mellitus        Relevant Orders    Hemoglobin A1C            Health Maintenance Topics with due status: Not Due       Topic Last Completion Date    Lipid Panel 02/14/2022       Future Appointments   Date Time Provider Department Center   12/27/2022  9:00 AM NURSE, [facility]    1/10/2023 11:00 AM Tierra Bettencourt MD [facility] NEURO [place] [facility]   1/25/2023  1:30 PM AN Connolly [facility] GASTRO [place]    3/9/2023 12:30 PM [facility]       I spent a total of 30 minutes on the day of the visit.  This includes face to face time and non-face to face time preparing to see the patient (eg, review of tests), obtaining and/or reviewing separately obtained history, documenting clinical information in the electronic or other health record, independently interpreting results and communicating results to the patient/family/caregiver, or care coordinator.        Signature:  AN Mckeon  [facility]  [address] [facility] [address]    Date of encounter: 12/8/22

## 2022-12-08 NOTE — LETTER
December 8, 2022      [facility]  [facility] [id number] [facility] [address]  Phone: [phone]       Patient: Arcadio Scott   YOB: 1960  Date of Visit: 12/08/2022    To Whom It May Concern:    Torres Scott  was at [facility] on 12/08/2022. The patient is followed here for primary care services. Patient has not been removed from work, and has passed physical examinations in the past per outside occupational physicians. If you have any questions or concerns, or if I can be of further assistance, please do not hesitate to contact me.    Sincerely,      AN Mckeon

## 2022-12-08 NOTE — ASSESSMENT & PLAN NOTE
Educated on aerobic exercise (3-5 days/week) and a low-fat, low-sodium diet  Avoid excess ETOH consumption. Smoking cessation if applicable  ED precautions (s/s of CVA, etc)  RTC in 2 weeks to re-check BP

## 2022-12-09 NOTE — ASSESSMENT & PLAN NOTE
He did undergo EMG 8/12/22 and was diagnosed with sensory axonal polyneuropathy of both legs. He received Lyrica from Dr. London but not needing regularly. Overall, sx improved

## 2022-12-27 ENCOUNTER — CLINICAL SUPPORT (OUTPATIENT)
Dept: INTERNAL MEDICINE | Facility: CLINIC | Age: 62
End: 2022-12-27
Payer: MEDICAID

## 2022-12-27 VITALS
SYSTOLIC BLOOD PRESSURE: 146 MMHG | WEIGHT: 201 LBS | HEART RATE: 71 BPM | BODY MASS INDEX: 29.77 KG/M2 | HEIGHT: 69 IN | DIASTOLIC BLOOD PRESSURE: 80 MMHG | TEMPERATURE: 98 F | RESPIRATION RATE: 18 BRPM

## 2022-12-27 DIAGNOSIS — R03.0 ELEVATED BLOOD PRESSURE READING: Primary | ICD-10-CM

## 2022-12-27 PROCEDURE — 3077F PR MOST RECENT SYSTOLIC BLOOD PRESSURE >= 140 MM HG: ICD-10-PCS | Mod: CPTII,,, | Performed by: NURSE PRACTITIONER

## 2022-12-27 PROCEDURE — 3077F SYST BP >= 140 MM HG: CPT | Mod: CPTII,,, | Performed by: NURSE PRACTITIONER

## 2022-12-27 PROCEDURE — 99213 OFFICE O/P EST LOW 20 MIN: CPT | Mod: PBBFAC

## 2022-12-27 NOTE — PROGRESS NOTES
Here for BP Check per DANIEL Dunham NP    BP Readin/80 manual reading    VT: 71    Last dose of Medications:none.    Complaints:none for blood pressure.   Provider sent  Blood Pressure reading.       Patient is not following a low sodium diet plan. Patient told nurse he was tired , he watched the football game and  drank vodka last night. Patient drinks soda's eats hassan ,ham and    chips . Instructed patient on low sodium diet plan, foods okay to eat and food and drinks to avoid .  Instructed patient he should not drink alcoholic beverages. Patient voiced understanding of all these instructions.        Patient complain of feet ,legs and knees hurting him. Pain score 8/10. Informed patient he can  go to [facility] or [facility] to get this checked out by a doctor. Patient told nurse he will go there if it continues to bother him.         MARCO A Norwood NP notified /80 VT 71. Informed her patient drank vodka last night while watching foodball game. Patient told nurse he is not following low sodium diet plan but has been instructed on this . No new orders noted.          Instructed patient if provider DANIEL Dunham NP has any new orders for him the nurse will call him at [phone] to notify him. Patient voiced understanding of this information.                   Discharged home with instructions and information to continue with all prescribed medications,follow up appointments, drink plenty of water daily, maintain low sodium intake and to walk/ exercise daily.Patient voiced understanding of discharge instructions.       .

## 2023-01-09 NOTE — PROGRESS NOTES
[facility] Neurology Initial Office Visit Note    Initial Visit Date: 1/10/2023  Current Visit Date:  01/10/2023    Chief Complaint:     Chief Complaint   Patient presents with    New patient referral for neuropathy in both feet-has had fo    States pain does go up to both knees       History of Present Illness:      This is 62 y.o. male with history of chronic lower back pain, alcohol dependence who is referred for bilateral lower extremity numbness for 1 + year. Patient states that he had bilateral LBP radiating down bilateral LE, L>R. He reports decreased sensation in bilateral feet, left worse than right. Denied any trip and falls. Reports bilateral knee pain, left > right. Drinks alcohol throughout the week, drinking around a case of beer every 2 weeks. Quit smoking in 11/2019. Denied any saddle anesthesia. Denied any urinary incontinence. 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        Medications:     Current Outpatient Medications on File Prior to Visit   Medication Sig Dispense Refill    famotidine (PEPCID) 40 MG tablet Take 1 tablet (40 mg total) by mouth nightly as needed for Heartburn. (Patient not taking: Reported on 12/27/2022) 90 tablet 1    pregabalin (LYRICA) 50 MG capsule Take 50 mg by mouth 2 (two) times daily.       Current Facility-Administered Medications on File Prior to Visit   Medication Dose Route Frequency Provider Last Rate Last Admin    LIDOcaine (PF) 10 mg/ml (1%) injection 10 mg  1 mL Intradermal Once AN [place]           Labs:     Results for orders placed or performed during the hospital encounter of 07/18/22   SARS Coronavirus 2 Antigen, POCT Manual Read   Result Value Ref Range    SARS Coronavirus 2 Antigen Negative Negative     Acceptable Yes    Specimen to Pathology   Result Value Ref Range    Case Report       [facility] Surgical Pathology                            Case: [id number]                                 Authorizing Provider:  Marcelo GRANDA  MD Génesis       Collected:           07/18/2022 08:13 AM          Ordering Location:     [facility] -       Received:            07/18/2022 11:02 AM                                 [facility]                                                              Pathologist:           Sabina Victor MD                                                         Specimens:   1) - Face, left facial cyst                                                                         2) - Eye, Left, left lower eye lesion #1                                                            3) - Eye, Left, left lower eye lesion #2                                                            4) - Neck, right neck lesion                                                                        5) - Neck, right lateral neck lesion                                                                 6) - Neck, right midline neck                                                                       7) - Neck, right neck lesion #2                                                            Final Diagnosis       1. Face, left facial cyst, excision:   - Epidermal inclusion cyst.      2. Left lower eye lesion #1, excision:   - Epidermal inclusion cyst.      3. Left lower eye lesion #2, excision:   - Epidermal inclusion cyst.      4. Right neck lesion, excision:   - Epidermal inclusion cyst.      5. Right lateral neck lesion, excision:   - Epidermal inclusion cyst.      6. Right midline neck, excision:   - Epidermal inclusion cyst.      7. Right neck lesion #2, excision:   - Epidermal inclusion cyst.           Clinical Information       Procedure:  EXCISION, LESION, FACE AND NECK - Bilateral  Pre-op Diagnosis:  R22.0 - Facial mass [ICD-10-CM]  Post-op Diagnosis:  R22.0 - Facial mass [ICD-10-CM]        Microscopic Description       A microscopic examination was performed and the diagnosis reflects the findings.            Gross Description       1.  "Face, left facial cyst:   Received in formalin is a thin walled smooth cyst measuring 5 x 3.8 x 1.2 cm.  The cyst wall is 1 mm.  It is filled with a white flaky substance.  Sections submitted into cassette  2. Eye, Left, left lower eye lesion #1:   Received in formalin is a smooth thin walled cyst measuring 1.8 x 1.5 x 0.5 cm it contains a likely white substance.  Representative sections in 1 cassette  3. Eye, Left, left lower eye lesion #2:   Received in formalin is an ellipse of skin with underlying cystic structure.  It measures 2 x 1.2 x 1.5 cm sectioning reveals a cyst measuring 1.5 cm.  Representative sections in 1 cassette  4. Neck, right neck lesion:   Received in formalin is an oval smooth thin walled cyst measuring 1.5 x 1.2 x 0.5 cm cut section reveals a flaky white substance representative sections in 1 cassette.  5. Neck, right lateral neck lesion:   Received in formalin is a nodular fragment of skin measuring 2 x 1.6 x 0.8 cm there is darkly pigmented skin on the nodule, except for the surgical margin sectioning reveals a 1.5 cm thin-walled cyst.  Representative sections  6. Neck, right midline neck:   Received in formalin are 2 nodules measuring 1.5 x 1.5 x 0.9 cm and 2.2 x 1.2 x 0.6 cm.  Sectioning shows these to be cyst with a thin wall filled with a flaky substance representative sections 1 cassette   7. Neck, right neck lesion #2:   Received in formalin is an oval cyst measuring 2.2 x 1.6 x 1.5 cm.  Is thin walled filled with a flaky white substance representative sections in 1 cassette.      Report Footnotes       Unless the case is a "gross only" or additional testing only, the final diagnosis for each specimen is based on a microscopic examination of appropriate tissue sections.         Studies:      MRI L-spine without contrast: Pending.    NCS/EMG bilateral LE 8/12/2022 by Dr. Marck London:   I have reviewed the study independently and with the patient. Incomplete study.  Also, no reference " range.  No conduction velocity.  Latency and amplitude of SNAP and CMAP mostly symmetric without reference range.  No F-waves.     Review of Systems:     Review of Systems   All other systems reviewed and are negative.    Physical Exams:     Vitals:    01/10/23 1130   BP: (!) 146/84   Pulse:    Resp:    Temp:        Physical Exam  Vitals and nursing note reviewed.   Constitutional:       Appearance: Normal appearance.   HENT:      Head: Normocephalic and atraumatic.      Nose: Nose normal.      Mouth/Throat:      Mouth: Mucous membranes are moist.      Pharynx: Oropharynx is clear.   Eyes:      Conjunctiva/sclera: Conjunctivae normal.   Cardiovascular:      Rate and Rhythm: Normal rate and regular rhythm.      Pulses: Normal pulses.      Heart sounds: Normal heart sounds.   Pulmonary:      Effort: Pulmonary effort is normal.      Breath sounds: Normal breath sounds.   Abdominal:      General: Abdomen is flat.      Palpations: Abdomen is soft.   Musculoskeletal:         General: Normal range of motion.      Cervical back: Normal range of motion.   Neurological:      Mental Status: He is alert.   Psychiatric:         Mood and Affect: Mood normal.       Comprehensive Neurological Exam:  Mental Status: Alert Oriented to Self, Date, and Place. Comprehension wnl. No dysarthria.   CN II - XII: DUARTE, No APD, VFFC, No ptosis OU, EOMI without nystagmus, LT/Temp symmetric in CN V1-3 distribution, Hearing grossly intact, Face Symmetric, Tongue and Uvula midline, Trapezius symmetric bilateral.   Motor: tone and bulk wnl throughout, no abnormal involuntary or voluntary movements, 5/5 to confrontation, Fine finger movements wnl b/l, No pronator drift.   Sensory: LT, Proprioception, Vibration, PP, Temp symmetric subjectively decreased in bilateral LE throughout.   Reflexes: 2+ throughout except for 1+ in bilateral AJ, plantar reflexes downward bilateral.   Cerebellar: FNF wnl b/l, RAHM wnl b/l.  Romberg: Negative  Gait: antalgic  gait.     Assessment:     This is 62 y.o. male with history of chronic lower back pain, alcohol dependence who is referred for bilateral lower extremity numbness. NCS/EMG results were inconclusive due to missing components based on report. Exam and history more consistent with lumbar radiculopathy     Problem List Items Addressed This Visit          Neuro    Polyneuropathy    Relevant Orders    CBC Auto Differential    Folate    T4, Free    TSH    Vitamin B12    Hemoglobin A1C    SYPHILIS ANTIBODY (WITH REFLEX RPR)    HIV 1/2 Ag/Ab (4th Gen)    Immunofixation & Protein Electrophoresis & Immunoglobulins    Peripheral sensory-motor axonal polyneuropathy - Primary       Plan:     [] pending MRI L-spine  [] polyneuropathy labs.     RTC 3 Months with NP     I have explained the treatment plan, diagnosis, and prognosis to patient. All questions are answered to the best of my knowledge.     Face to face time 60 minutes, including documentation, chart review, counseling, education, review of test results, relevant medical records, and coordination of care.   I have explained the treatment plan, diagnosis, and prognosis to patient. All questions are answered to the best of my knowledge.       Tierra Bettencourt MD   General Neurology  01/10/2023

## 2023-01-10 ENCOUNTER — OFFICE VISIT (OUTPATIENT)
Dept: NEUROLOGY | Facility: CLINIC | Age: 63
End: 2023-01-10
Payer: MEDICAID

## 2023-01-10 VITALS
SYSTOLIC BLOOD PRESSURE: 146 MMHG | BODY MASS INDEX: 29.3 KG/M2 | DIASTOLIC BLOOD PRESSURE: 84 MMHG | HEIGHT: 69 IN | WEIGHT: 197.81 LBS | RESPIRATION RATE: 14 BRPM | TEMPERATURE: 98 F | OXYGEN SATURATION: 98 % | HEART RATE: 76 BPM

## 2023-01-10 DIAGNOSIS — G62.9 POLYNEUROPATHY: ICD-10-CM

## 2023-01-10 DIAGNOSIS — M48.062 SPINAL STENOSIS OF LUMBAR REGION WITH NEUROGENIC CLAUDICATION: Primary | ICD-10-CM

## 2023-01-10 PROBLEM — G60.8 PERIPHERAL SENSORY-MOTOR AXONAL POLYNEUROPATHY: Status: ACTIVE | Noted: 2023-01-10

## 2023-01-10 PROCEDURE — 3008F BODY MASS INDEX DOCD: CPT | Mod: CPTII,,, | Performed by: PSYCHIATRY & NEUROLOGY

## 2023-01-10 PROCEDURE — 1159F MED LIST DOCD IN RCRD: CPT | Mod: CPTII,,, | Performed by: PSYCHIATRY & NEUROLOGY

## 2023-01-10 PROCEDURE — 1159F PR MEDICATION LIST DOCUMENTED IN MEDICAL RECORD: ICD-10-PCS | Mod: CPTII,,, | Performed by: PSYCHIATRY & NEUROLOGY

## 2023-01-10 PROCEDURE — 99205 OFFICE O/P NEW HI 60 MIN: CPT | Mod: S$PBB,,, | Performed by: PSYCHIATRY & NEUROLOGY

## 2023-01-10 PROCEDURE — 3008F PR BODY MASS INDEX (BMI) DOCUMENTED: ICD-10-PCS | Mod: CPTII,,, | Performed by: PSYCHIATRY & NEUROLOGY

## 2023-01-10 PROCEDURE — 3077F SYST BP >= 140 MM HG: CPT | Mod: CPTII,,, | Performed by: PSYCHIATRY & NEUROLOGY

## 2023-01-10 PROCEDURE — 3079F DIAST BP 80-89 MM HG: CPT | Mod: CPTII,,, | Performed by: PSYCHIATRY & NEUROLOGY

## 2023-01-10 PROCEDURE — 3077F PR MOST RECENT SYSTOLIC BLOOD PRESSURE >= 140 MM HG: ICD-10-PCS | Mod: CPTII,,, | Performed by: PSYCHIATRY & NEUROLOGY

## 2023-01-10 PROCEDURE — 3079F PR MOST RECENT DIASTOLIC BLOOD PRESSURE 80-89 MM HG: ICD-10-PCS | Mod: CPTII,,, | Performed by: PSYCHIATRY & NEUROLOGY

## 2023-01-10 PROCEDURE — 99214 OFFICE O/P EST MOD 30 MIN: CPT | Mod: PBBFAC | Performed by: PSYCHIATRY & NEUROLOGY

## 2023-01-10 PROCEDURE — 99205 PR OFFICE/OUTPT VISIT, NEW, LEVL V, 60-74 MIN: ICD-10-PCS | Mod: S$PBB,,, | Performed by: PSYCHIATRY & NEUROLOGY

## 2023-01-11 ENCOUNTER — TELEPHONE (OUTPATIENT)
Dept: INTERNAL MEDICINE | Facility: CLINIC | Age: 63
End: 2023-01-11
Payer: MEDICAID

## 2023-01-11 NOTE — TELEPHONE ENCOUNTER
----- Message from Marah Ochoa RN sent at 1/10/2023  3:15 PM CST -----  Regarding: RE: BP Readings  Good Afternoon    He stated he went to the window at the [facility] clinic and was told his appointment was with neuro.    Can someone please contact him to reschedule? We had instructed him to go back to the [facility] window after his appointment with Dr Bettencourt.    Thank you      ----- Message -----  From: AN Mckeon  Sent: 1/10/2023   2:18 PM CST  To: Marah Ochoa RN  Subject: RE: BP Readings                                  His appt was supposed to be at 10 am here. Did he miss? I was not informed that he came. If so, he needs to call to reschedule.  ----- Message -----  From: Marah Ochoa RN  Sent: 1/10/2023  11:37 AM CST  To: AN Mckeon  Subject: BP Readings                                      Good Morning    Mr Scott is here for his neurology appointment. His BP reading were 158/88-right arm , sitting   And 146/84, left arm sitting.    He stated he went to the [facility] window and was told to come here, but we see that he was to have a nurse visit for BP per the appointment tab.      Thank you

## 2023-01-25 ENCOUNTER — CLINICAL SUPPORT (OUTPATIENT)
Dept: INTERNAL MEDICINE | Facility: CLINIC | Age: 63
End: 2023-01-25
Payer: MEDICAID

## 2023-01-25 ENCOUNTER — OFFICE VISIT (OUTPATIENT)
Dept: GASTROENTEROLOGY | Facility: CLINIC | Age: 63
End: 2023-01-25
Payer: MEDICAID

## 2023-01-25 VITALS
WEIGHT: 200.81 LBS | BODY MASS INDEX: 28.75 KG/M2 | SYSTOLIC BLOOD PRESSURE: 162 MMHG | HEIGHT: 70 IN | HEART RATE: 74 BPM | OXYGEN SATURATION: 98 % | TEMPERATURE: 99 F | RESPIRATION RATE: 16 BRPM | DIASTOLIC BLOOD PRESSURE: 88 MMHG

## 2023-01-25 VITALS
TEMPERATURE: 98 F | SYSTOLIC BLOOD PRESSURE: 168 MMHG | RESPIRATION RATE: 18 BRPM | BODY MASS INDEX: 29.62 KG/M2 | HEART RATE: 73 BPM | WEIGHT: 200 LBS | HEIGHT: 69 IN | DIASTOLIC BLOOD PRESSURE: 82 MMHG

## 2023-01-25 DIAGNOSIS — R03.0 ELEVATED BLOOD PRESSURE READING: Primary | ICD-10-CM

## 2023-01-25 DIAGNOSIS — Z86.010 PERSONAL HISTORY OF COLONIC POLYPS: Primary | ICD-10-CM

## 2023-01-25 DIAGNOSIS — R11.2 NAUSEA AND VOMITING, UNSPECIFIED VOMITING TYPE: ICD-10-CM

## 2023-01-25 DIAGNOSIS — K21.9 GASTROESOPHAGEAL REFLUX DISEASE, UNSPECIFIED WHETHER ESOPHAGITIS PRESENT: ICD-10-CM

## 2023-01-25 DIAGNOSIS — Z78.9 ALCOHOL USE: ICD-10-CM

## 2023-01-25 PROBLEM — Z86.0100 PERSONAL HISTORY OF COLONIC POLYPS: Status: ACTIVE | Noted: 2023-01-25

## 2023-01-25 PROBLEM — F10.90 ALCOHOL USE: Status: ACTIVE | Noted: 2023-01-25

## 2023-01-25 PROBLEM — F10.10 ALCOHOL ABUSE: Status: ACTIVE | Noted: 2023-01-25

## 2023-01-25 PROCEDURE — 3079F DIAST BP 80-89 MM HG: CPT | Mod: CPTII,,, | Performed by: NURSE PRACTITIONER

## 2023-01-25 PROCEDURE — 99204 OFFICE O/P NEW MOD 45 MIN: CPT | Mod: S$PBB,,, | Performed by: NURSE PRACTITIONER

## 2023-01-25 PROCEDURE — 3077F SYST BP >= 140 MM HG: CPT | Mod: CPTII,,, | Performed by: NURSE PRACTITIONER

## 2023-01-25 PROCEDURE — 3008F BODY MASS INDEX DOCD: CPT | Mod: CPTII,,, | Performed by: NURSE PRACTITIONER

## 2023-01-25 PROCEDURE — 3079F PR MOST RECENT DIASTOLIC BLOOD PRESSURE 80-89 MM HG: ICD-10-PCS | Mod: CPTII,,, | Performed by: NURSE PRACTITIONER

## 2023-01-25 PROCEDURE — 1160F PR REVIEW ALL MEDS BY PRESCRIBER/CLIN PHARMACIST DOCUMENTED: ICD-10-PCS | Mod: CPTII,,, | Performed by: NURSE PRACTITIONER

## 2023-01-25 PROCEDURE — 1159F MED LIST DOCD IN RCRD: CPT | Mod: CPTII,,, | Performed by: NURSE PRACTITIONER

## 2023-01-25 PROCEDURE — 3008F PR BODY MASS INDEX (BMI) DOCUMENTED: ICD-10-PCS | Mod: CPTII,,, | Performed by: NURSE PRACTITIONER

## 2023-01-25 PROCEDURE — 3077F PR MOST RECENT SYSTOLIC BLOOD PRESSURE >= 140 MM HG: ICD-10-PCS | Mod: CPTII,,, | Performed by: NURSE PRACTITIONER

## 2023-01-25 PROCEDURE — 99214 OFFICE O/P EST MOD 30 MIN: CPT | Mod: PBBFAC,27 | Performed by: NURSE PRACTITIONER

## 2023-01-25 PROCEDURE — 99213 OFFICE O/P EST LOW 20 MIN: CPT | Mod: PBBFAC

## 2023-01-25 PROCEDURE — 99204 PR OFFICE/OUTPT VISIT, NEW, LEVL IV, 45-59 MIN: ICD-10-PCS | Mod: S$PBB,,, | Performed by: NURSE PRACTITIONER

## 2023-01-25 PROCEDURE — 1159F PR MEDICATION LIST DOCUMENTED IN MEDICAL RECORD: ICD-10-PCS | Mod: CPTII,,, | Performed by: NURSE PRACTITIONER

## 2023-01-25 PROCEDURE — 1160F RVW MEDS BY RX/DR IN RCRD: CPT | Mod: CPTII,,, | Performed by: NURSE PRACTITIONER

## 2023-01-25 RX ORDER — PANTOPRAZOLE SODIUM 40 MG/1
40 TABLET, DELAYED RELEASE ORAL DAILY
Qty: 30 TABLET | Refills: 11 | Status: SHIPPED | OUTPATIENT
Start: 2023-01-25 | End: 2023-11-01 | Stop reason: SDUPTHER

## 2023-01-25 RX ORDER — POLYETHYLENE GLYCOL 3350, SODIUM SULFATE, SODIUM CHLORIDE, POTASSIUM CHLORIDE, SODIUM ASCORBATE, AND ASCORBIC ACID 7.5-2.691G
2000 KIT ORAL ONCE
Qty: 1 KIT | Refills: 0 | Status: SHIPPED | OUTPATIENT
Start: 2023-01-25 | End: 2023-01-25

## 2023-01-25 NOTE — ASSESSMENT & PLAN NOTE
He underwent colonoscopy January 28, 2022 revealed seven 3-10 mm polyps in the sigmoid colon, descending colon, proximal descending colon and hepatic flexure removed with cold snare, resected and retrieved.  Repeat colonoscopy was recommended in 1 year.  Pathology revealed the following:    Final Diagnosis (Verified)  1.  Hepatic flexure polyp, Polypectomy:  - Tubular adenoma.  - No evidence of malignancy.    2.  Proximal descending colon polyp x3, Polypectomy:  - Tubular adenoma x 3.  - No evidence of malignancy.     3.  Sigmoid colon polyp x 3, Polypectomy:  - Tubular adenoma x 1.  - Hyperplastic polyp x 2.  - No evidence of malignancy.     Colonoscopy  Call with updates

## 2023-01-25 NOTE — ASSESSMENT & PLAN NOTE
Abdominal ultrasound March 9, 2022 revealed hepatomegaly and diffuse hepatic steatosis, gallbladder sludge without evidence of cholecystitis.  CBC, CMP, lipase  Abdominal ultrasound  Recommend alcohol cessation and continued tobacco cessation  Start pantoprazole 40 mg daily  EGD and colonoscopy sooner than later  Call with updates  ER precautions provided

## 2023-01-25 NOTE — PROGRESS NOTES
Subjective:       Patient ID: Arcadio Scott is a 62 y.o. male.    Chief Complaint: Gastroesophageal Reflux and Emesis (Vomiting once or twice a month for a year)    This 62-year-old  male with a history of GERD and neuropathy is referred for EGD.  He presents unaccompanied.  He reports intermittent nausea with subsequent vomiting for the past year, occurring approximately 2-3 times per month.  He is unable to identify specific triggers and reports he will have onset of nausea and vomiting with or without eating.  The vomiting can last for several hours before stopping.  He denies taking anything for symptomatic relief.  He denies bloody or bilious emesis.  His appetite is fair and he reports an unintentional weight loss of approximately 15 lb since onset of symptoms (per review of documentation, his weight was documented between 193-200 lb one year ago).  He has occasional acid reflux and takes famotidine 40 mg daily which he has been out of for the past 1-2 months.  He denies fever, chills, hematemesis, odynophagia, dysphagia, belching, bloating, early satiety, or abdominal pain.  Bowel habits are described as formed stools every 1-2 days without melena, hematochezia, fecal urgency, fecal incontinence, or pain with defecation.  He does report 1 episode of red blood with bowel movements noted on the tissue after wiping last month.    Abdominal ultrasound March 9, 2022 revealed hepatomegaly and diffuse hepatic steatosis, gallbladder sludge without evidence of cholecystitis.    He underwent colonoscopy January 28, 2022 revealed seven 3-10 mm polyps in the sigmoid colon, descending colon, proximal descending colon and hepatic flexure removed with cold snare, resected and retrieved.  Repeat colonoscopy was recommended in 1 year.  Pathology revealed the following:    Final Diagnosis (Verified)  1.  Hepatic flexure polyp, Polypectomy:  - Tubular adenoma.  - No evidence of malignancy.    2.  Proximal  descending colon polyp x3, Polypectomy:  - Tubular adenoma x 3.  - No evidence of malignancy.     3.  Sigmoid colon polyp x 3, Polypectomy:  - Tubular adenoma x 1.  - Hyperplastic polyp x 2.  - No evidence of malignancy.    He denies ever having an EGD done. He denies regular NSAID use or use of blood thinners. He denies tobacco use and previously smoked cigars. He drinks approximately 12 beers per week. He denies a family history of IBD, colon polyps, or colon cancer. His brother has a history of stomach cancer.    Review of patient's allergies indicates:  No Known Allergies    Past Medical History:   Diagnosis Date    GERD (gastroesophageal reflux disease)     Neuropathy      Past Surgical History:   Procedure Laterality Date    COLONOSCOPY W/ POLYPECTOMY  01/28/2022    CYST REMOVAL      Facial    SURGICAL REMOVAL OF LESION OF FACE Bilateral 07/18/2022    Procedure: EXCISION, LESION, FACE AND NECK;  Surgeon: Marcelo Capps MD;  Location: [facility];  Service: ENT;  Laterality: Bilateral;     Family History:   family history includes Alzheimer's disease in his maternal grandmother; Aneurysm in his sister; Cancer in his father; Diabetes in his brother and mother; Heart disease in his mother; Stomach cancer in his brother.    Social History:    reports that he has quit smoking. His smoking use included cigars. He has never used smokeless tobacco. He reports current alcohol use of about 12.0 standard drinks per week. He reports that he does not use drugs.    Review of Systems  Negative except as noted in the HPI.      Objective:      Physical Exam  Constitutional:       Appearance: Normal appearance.      Comments: Ambulates with cane   HENT:      Head: Normocephalic.      Mouth/Throat:      Mouth: Mucous membranes are moist.   Eyes:      Extraocular Movements: Extraocular movements intact.      Conjunctiva/sclera: Conjunctivae normal.      Pupils: Pupils are equal, round, and reactive to light.   Cardiovascular:       Rate and Rhythm: Normal rate and regular rhythm.      Pulses: Normal pulses.      Heart sounds: Normal heart sounds.   Pulmonary:      Effort: Pulmonary effort is normal.      Breath sounds: Normal breath sounds.   Abdominal:      General: Bowel sounds are normal.      Palpations: Abdomen is soft.   Musculoskeletal:         General: Normal range of motion.      Cervical back: Normal range of motion and neck supple.   Skin:     General: Skin is warm and dry.   Neurological:      General: No focal deficit present.      Mental Status: He is alert and oriented to person, place, and time.   Psychiatric:         Mood and Affect: Mood normal.         Behavior: Behavior normal.         Thought Content: Thought content normal.         Judgment: Judgment normal.       Home Medications:     Current Outpatient Medications   Medication Sig    famotidine (PEPCID) 40 MG tablet Take 1 tablet (40 mg total) by mouth nightly as needed for Heartburn.    pregabalin (LYRICA) 50 MG capsule Take 50 mg by mouth 2 (two) times daily.     Facility-Administered Medications Ordered in Other Visits   Medication Frequency    LIDOcaine (PF) 10 mg/ml (1%) injection 10 mg Once     Imaging Results:     Narrative & Impression  Clinical History  Thrombocytopenia.     Technique  Limited abdominal Ultrasound Images obtained in grayscale and color.     Comparison  No prior imaging available for comparison.     Findings  Ultrasound evaluation of the abdomen demonstrates normal homogeneous.  The visualized pancreatic body. The pancreatic head and tail are not  visualized due to overlying bowel gas. The aorta is nonaneurysmal. The  IVC is patent.     The liver is enlarged measuring 20.7 cm. The liver parenchyma is  diffusely hyperechogenic. There is no intrahepatic mass. There is no  biliary dilatation. The main portal vein is patent with hepatopedal  flow. The CBD measures 5 mm. There is echogenic sludge in the  gallbladder lumen. There is no gallbladder  wall thickening or  pericholecystic fluid. There is no cholelithiasis. There is no  sonographic Marquis sign.     The right kidney measures 10.7 x 5.8 x 5.3 cm. There is no  hydronephrosis or nephrolithiasis of the right kidney.     Impression     1. Hepatomegaly and diffuse hepatic steatosis.  2. Gallbladder sludge without evidence of cholecystitis.      Electronically Signed By: Chaitanya Polanco MD  Date/Time Signed: 03/09/2022 11:11    Assessment/Plan:     Problem List Items Addressed This Visit          Psychiatric    Alcohol use     Recommend alcohol cessation.            GI    Gastroesophageal reflux disease     Abdominal ultrasound March 9, 2022 revealed hepatomegaly and diffuse hepatic steatosis, gallbladder sludge without evidence of cholecystitis.  CBC, CMP, lipase  Abdominal ultrasound  Recommend alcohol cessation and continued tobacco cessation  Start pantoprazole 40 mg daily  EGD and colonoscopy sooner than later  Call with updates  ER precautions provided         Relevant Medications    pantoprazole (PROTONIX) 40 MG tablet    Other Relevant Orders    Case Request Endoscopy: EGD (ESOPHAGOGASTRODUODENOSCOPY), COLONOSCOPY (Completed)    CBC Auto Differential    Comprehensive Metabolic Panel    Lipase    US Abdomen Limited    Nausea and vomiting     See above         Relevant Medications    pantoprazole (PROTONIX) 40 MG tablet    Other Relevant Orders    Case Request Endoscopy: EGD (ESOPHAGOGASTRODUODENOSCOPY), COLONOSCOPY (Completed)    CBC Auto Differential    Comprehensive Metabolic Panel    Lipase    US Abdomen Limited    Personal history of colonic polyps - Primary     He underwent colonoscopy January 28, 2022 revealed seven 3-10 mm polyps in the sigmoid colon, descending colon, proximal descending colon and hepatic flexure removed with cold snare, resected and retrieved.  Repeat colonoscopy was recommended in 1 year.  Pathology revealed the following:    Final Diagnosis (Verified)  1.  Hepatic flexure  polyp, Polypectomy:  - Tubular adenoma.  - No evidence of malignancy.    2.  Proximal descending colon polyp x3, Polypectomy:  - Tubular adenoma x 3.  - No evidence of malignancy.     3.  Sigmoid colon polyp x 3, Polypectomy:  - Tubular adenoma x 1.  - Hyperplastic polyp x 2.  - No evidence of malignancy.     Colonoscopy  Call with updates         Relevant Orders    Case Request Endoscopy: EGD (ESOPHAGOGASTRODUODENOSCOPY), COLONOSCOPY (Completed)

## 2023-01-27 ENCOUNTER — TELEPHONE (OUTPATIENT)
Dept: INTERNAL MEDICINE | Facility: CLINIC | Age: 63
End: 2023-01-27
Payer: MEDICAID

## 2023-01-27 DIAGNOSIS — R03.0 ELEVATED BLOOD PRESSURE READING: Primary | ICD-10-CM

## 2023-01-27 RX ORDER — AMLODIPINE BESYLATE 5 MG/1
5 TABLET ORAL DAILY
Qty: 30 TABLET | Refills: 2 | Status: SHIPPED | OUTPATIENT
Start: 2023-01-27 | End: 2023-02-09 | Stop reason: DRUGHIGH

## 2023-02-08 ENCOUNTER — CLINICAL SUPPORT (OUTPATIENT)
Dept: INTERNAL MEDICINE | Facility: CLINIC | Age: 63
End: 2023-02-08
Payer: MEDICAID

## 2023-02-08 ENCOUNTER — HOSPITAL ENCOUNTER (OUTPATIENT)
Dept: RADIOLOGY | Facility: HOSPITAL | Age: 63
Discharge: HOME | End: 2023-02-08
Attending: NURSE PRACTITIONER
Payer: MEDICAID

## 2023-02-08 VITALS
HEIGHT: 70 IN | HEART RATE: 65 BPM | SYSTOLIC BLOOD PRESSURE: 150 MMHG | RESPIRATION RATE: 18 BRPM | BODY MASS INDEX: 29.06 KG/M2 | WEIGHT: 203 LBS | DIASTOLIC BLOOD PRESSURE: 78 MMHG | TEMPERATURE: 98 F

## 2023-02-08 DIAGNOSIS — R03.0 ELEVATED BLOOD PRESSURE READING: ICD-10-CM

## 2023-02-08 DIAGNOSIS — I10 HYPERTENSION, UNSPECIFIED TYPE: Primary | ICD-10-CM

## 2023-02-08 DIAGNOSIS — R11.2 NAUSEA AND VOMITING, UNSPECIFIED VOMITING TYPE: ICD-10-CM

## 2023-02-08 DIAGNOSIS — K21.9 GASTROESOPHAGEAL REFLUX DISEASE, UNSPECIFIED WHETHER ESOPHAGITIS PRESENT: ICD-10-CM

## 2023-02-08 PROCEDURE — 3077F SYST BP >= 140 MM HG: CPT | Mod: CPTII,,, | Performed by: NURSE PRACTITIONER

## 2023-02-08 PROCEDURE — 99214 OFFICE O/P EST MOD 30 MIN: CPT | Mod: PBBFAC,25

## 2023-02-08 PROCEDURE — 76705 ECHO EXAM OF ABDOMEN: CPT | Mod: TC

## 2023-02-08 PROCEDURE — 3077F PR MOST RECENT SYSTOLIC BLOOD PRESSURE >= 140 MM HG: ICD-10-PCS | Mod: CPTII,,, | Performed by: NURSE PRACTITIONER

## 2023-02-08 NOTE — PROGRESS NOTES
Here for BP Check per beau Dunham NP    BP Readin/78 manual reading    SC:65    Last dose of Medications: Amlodipine 5 mg taken 23 @ 0300 am.     Complaints:none.   Provider sent  Blood Pressure reading.        BEAU Dunham NP notified /78 SC 65. New Orders noted. Increase Amlodipine to 10 mg daily. It is okay to take two of the amlodipine 5 mg tablets to equal 10 mg dose. New Rx e-scripted to pharmacy for Amlodipine 10 mg one tablet daily. Once patient completes bottle of 5 mg amlodipine tablets, He will start the Amlodipine 10 mg and take only one tablet daily. RTC in 2 weeks for BP check nurse visit appointment. Patient voiced understanding of all of these instructions.                   Discharged home with instructions and information to continue with all prescribed medications,follow up appointments, drink plenty of water daily, maintain low sodium intake and to walk/ exercise daily.Patient voiced understanding of discharge instructions.

## 2023-02-09 ENCOUNTER — TELEPHONE (OUTPATIENT)
Dept: GASTROENTEROLOGY | Facility: CLINIC | Age: 63
End: 2023-02-09
Payer: MEDICAID

## 2023-02-09 RX ORDER — AMLODIPINE BESYLATE 10 MG/1
10 TABLET ORAL DAILY
Qty: 30 TABLET | Refills: 5 | Status: SHIPPED | OUTPATIENT
Start: 2023-02-09 | End: 2023-03-09 | Stop reason: SDUPTHER

## 2023-02-09 NOTE — TELEPHONE ENCOUNTER
Results to pt, verbalized understanding.    ----- Message from AN Connolly sent at 2/8/2023  3:17 PM CST -----  Please notify findings of hepatomegaly with hepatic steatosis.  Thanks

## 2023-03-01 ENCOUNTER — CLINICAL SUPPORT (OUTPATIENT)
Dept: INTERNAL MEDICINE | Facility: CLINIC | Age: 63
End: 2023-03-01
Payer: MEDICAID

## 2023-03-01 VITALS
SYSTOLIC BLOOD PRESSURE: 119 MMHG | HEART RATE: 100 BPM | OXYGEN SATURATION: 97 % | BODY MASS INDEX: 27.92 KG/M2 | TEMPERATURE: 98 F | WEIGHT: 195 LBS | RESPIRATION RATE: 18 BRPM | HEIGHT: 70 IN | DIASTOLIC BLOOD PRESSURE: 71 MMHG

## 2023-03-01 DIAGNOSIS — R03.0 ELEVATED BLOOD PRESSURE READING: Primary | ICD-10-CM

## 2023-03-01 PROCEDURE — 3074F PR MOST RECENT SYSTOLIC BLOOD PRESSURE < 130 MM HG: ICD-10-PCS | Mod: CPTII,,, | Performed by: NURSE PRACTITIONER

## 2023-03-01 PROCEDURE — 3074F SYST BP LT 130 MM HG: CPT | Mod: CPTII,,, | Performed by: NURSE PRACTITIONER

## 2023-03-01 PROCEDURE — 99213 OFFICE O/P EST LOW 20 MIN: CPT | Mod: PBBFAC

## 2023-03-01 NOTE — PROGRESS NOTES
Here for BP Check per DANIEL Dunham NP    BP Readin/71    OK:100    Last dose of Medications: Amlodipine 10 mg (2 of 5 mg tablets)      taken 23 @ 0600 am.    Complaints:none.   Provider sent  Blood Pressure reading.                   Discharged home with instructions and information to continue with all prescribed medications,follow up appointments, drink plenty of water daily, maintain low sodium intake and to walk/ exercise daily.Patient voiced understanding of discharge instructions..

## 2023-03-03 ENCOUNTER — TELEPHONE (OUTPATIENT)
Dept: GASTROENTEROLOGY | Facility: CLINIC | Age: 63
End: 2023-03-03
Payer: MEDICAID

## 2023-03-07 ENCOUNTER — LAB VISIT (OUTPATIENT)
Dept: LAB | Facility: HOSPITAL | Age: 63
End: 2023-03-07
Attending: NURSE PRACTITIONER
Payer: MEDICAID

## 2023-03-07 ENCOUNTER — TELEPHONE (OUTPATIENT)
Dept: INTERNAL MEDICINE | Facility: CLINIC | Age: 63
End: 2023-03-07
Payer: MEDICAID

## 2023-03-07 DIAGNOSIS — Z11.3 ROUTINE SCREENING FOR STI (SEXUALLY TRANSMITTED INFECTION): ICD-10-CM

## 2023-03-07 DIAGNOSIS — Z00.00 WELLNESS EXAMINATION: ICD-10-CM

## 2023-03-07 DIAGNOSIS — Z12.5 SCREENING FOR PROSTATE CANCER: ICD-10-CM

## 2023-03-07 LAB
ALBUMIN SERPL-MCNC: 3.9 G/DL (ref 3.4–4.8)
ALBUMIN/GLOB SERPL: 1 RATIO (ref 1.1–2)
ALP SERPL-CCNC: 70 UNIT/L (ref 40–150)
ALT SERPL-CCNC: 18 UNIT/L (ref 0–55)
AST SERPL-CCNC: 79 UNIT/L (ref 5–34)
BILIRUBIN DIRECT+TOT PNL SERPL-MCNC: 1.1 MG/DL
BUN SERPL-MCNC: 6.1 MG/DL (ref 8.4–25.7)
CALCIUM SERPL-MCNC: 9.4 MG/DL (ref 8.8–10)
CHLORIDE SERPL-SCNC: 97 MMOL/L (ref 98–107)
CHOLEST SERPL-MCNC: 188 MG/DL
CHOLEST/HDLC SERPL: 3 {RATIO} (ref 0–5)
CO2 SERPL-SCNC: 31 MMOL/L (ref 23–31)
CREAT SERPL-MCNC: 0.73 MG/DL (ref 0.73–1.18)
GFR SERPLBLD CREATININE-BSD FMLA CKD-EPI: >60 MLS/MIN/1.73/M2
GLOBULIN SER-MCNC: 4 GM/DL (ref 2.4–3.5)
GLUCOSE SERPL-MCNC: 103 MG/DL (ref 82–115)
HAV IGM SERPL QL IA: NONREACTIVE
HBV CORE IGM SERPL QL IA: NONREACTIVE
HBV SURFACE AG SERPL QL IA: NONREACTIVE
HCV AB SERPL QL IA: NONREACTIVE
HDLC SERPL-MCNC: 59 MG/DL (ref 35–60)
LDLC SERPL CALC-MCNC: 107 MG/DL (ref 50–140)
POTASSIUM SERPL-SCNC: 2.8 MMOL/L (ref 3.5–5.1)
PROT SERPL-MCNC: 7.9 GM/DL (ref 5.8–7.6)
PSA SERPL-MCNC: 0.82 NG/ML
SODIUM SERPL-SCNC: 143 MMOL/L (ref 136–145)
TRIGL SERPL-MCNC: 109 MG/DL (ref 34–140)
VLDLC SERPL CALC-MCNC: 22 MG/DL

## 2023-03-07 PROCEDURE — 80053 COMPREHEN METABOLIC PANEL: CPT

## 2023-03-07 PROCEDURE — 80074 ACUTE HEPATITIS PANEL: CPT

## 2023-03-07 PROCEDURE — 36415 COLL VENOUS BLD VENIPUNCTURE: CPT

## 2023-03-07 PROCEDURE — 84153 ASSAY OF PSA TOTAL: CPT

## 2023-03-07 PROCEDURE — 80061 LIPID PANEL: CPT

## 2023-03-07 RX ORDER — POTASSIUM CHLORIDE 20 MEQ/1
20 TABLET, EXTENDED RELEASE ORAL 2 TIMES DAILY
Qty: 14 TABLET | Refills: 0 | Status: SHIPPED | OUTPATIENT
Start: 2023-03-07 | End: 2023-03-14

## 2023-03-07 NOTE — TELEPHONE ENCOUNTER
Please inform patient that K+ significantly reduced. I have prescribed potassium chloride, he needs to obtain and start right away.     Avoid alcohol intake.     ED precautions for chest pain, SOB, palpitations, significant cramping, etc.

## 2023-03-08 LAB — PATH REV: NORMAL

## 2023-03-09 ENCOUNTER — OFFICE VISIT (OUTPATIENT)
Dept: INTERNAL MEDICINE | Facility: CLINIC | Age: 63
End: 2023-03-09
Payer: MEDICAID

## 2023-03-09 VITALS
SYSTOLIC BLOOD PRESSURE: 131 MMHG | HEIGHT: 70 IN | TEMPERATURE: 99 F | WEIGHT: 201 LBS | RESPIRATION RATE: 20 BRPM | HEART RATE: 80 BPM | BODY MASS INDEX: 28.77 KG/M2 | DIASTOLIC BLOOD PRESSURE: 77 MMHG

## 2023-03-09 DIAGNOSIS — R31.29 MICROHEMATURIA: ICD-10-CM

## 2023-03-09 DIAGNOSIS — I10 HYPERTENSION, UNSPECIFIED TYPE: ICD-10-CM

## 2023-03-09 DIAGNOSIS — G60.8 PERIPHERAL SENSORY-MOTOR AXONAL POLYNEUROPATHY: ICD-10-CM

## 2023-03-09 DIAGNOSIS — Z00.00 WELLNESS EXAMINATION: ICD-10-CM

## 2023-03-09 DIAGNOSIS — Z78.9 ALCOHOL USE: ICD-10-CM

## 2023-03-09 DIAGNOSIS — R74.01 TRANSAMINITIS: ICD-10-CM

## 2023-03-09 DIAGNOSIS — E87.6 HYPOKALEMIA: ICD-10-CM

## 2023-03-09 PROBLEM — Z02.89 ENCOUNTER FOR COMPLETION OF FORM WITH PATIENT: Status: RESOLVED | Noted: 2022-12-08 | Resolved: 2023-03-09

## 2023-03-09 PROBLEM — F10.10 ALCOHOL ABUSE: Status: RESOLVED | Noted: 2023-01-25 | Resolved: 2023-03-09

## 2023-03-09 PROBLEM — R03.0 ELEVATED BLOOD PRESSURE READING: Status: RESOLVED | Noted: 2022-12-08 | Resolved: 2023-03-09

## 2023-03-09 PROCEDURE — 1160F PR REVIEW ALL MEDS BY PRESCRIBER/CLIN PHARMACIST DOCUMENTED: ICD-10-PCS | Mod: CPTII,,, | Performed by: NURSE PRACTITIONER

## 2023-03-09 PROCEDURE — 3075F PR MOST RECENT SYSTOLIC BLOOD PRESS GE 130-139MM HG: ICD-10-PCS | Mod: CPTII,,, | Performed by: NURSE PRACTITIONER

## 2023-03-09 PROCEDURE — 3008F PR BODY MASS INDEX (BMI) DOCUMENTED: ICD-10-PCS | Mod: CPTII,,, | Performed by: NURSE PRACTITIONER

## 2023-03-09 PROCEDURE — 3078F DIAST BP <80 MM HG: CPT | Mod: CPTII,,, | Performed by: NURSE PRACTITIONER

## 2023-03-09 PROCEDURE — 1160F RVW MEDS BY RX/DR IN RCRD: CPT | Mod: CPTII,,, | Performed by: NURSE PRACTITIONER

## 2023-03-09 PROCEDURE — 3075F SYST BP GE 130 - 139MM HG: CPT | Mod: CPTII,,, | Performed by: NURSE PRACTITIONER

## 2023-03-09 PROCEDURE — 99214 OFFICE O/P EST MOD 30 MIN: CPT | Mod: S$PBB,,, | Performed by: NURSE PRACTITIONER

## 2023-03-09 PROCEDURE — 3078F PR MOST RECENT DIASTOLIC BLOOD PRESSURE < 80 MM HG: ICD-10-PCS | Mod: CPTII,,, | Performed by: NURSE PRACTITIONER

## 2023-03-09 PROCEDURE — 1159F PR MEDICATION LIST DOCUMENTED IN MEDICAL RECORD: ICD-10-PCS | Mod: CPTII,,, | Performed by: NURSE PRACTITIONER

## 2023-03-09 PROCEDURE — 3008F BODY MASS INDEX DOCD: CPT | Mod: CPTII,,, | Performed by: NURSE PRACTITIONER

## 2023-03-09 PROCEDURE — 99214 PR OFFICE/OUTPT VISIT, EST, LEVL IV, 30-39 MIN: ICD-10-PCS | Mod: S$PBB,,, | Performed by: NURSE PRACTITIONER

## 2023-03-09 PROCEDURE — 99214 OFFICE O/P EST MOD 30 MIN: CPT | Mod: PBBFAC | Performed by: NURSE PRACTITIONER

## 2023-03-09 PROCEDURE — 1159F MED LIST DOCD IN RCRD: CPT | Mod: CPTII,,, | Performed by: NURSE PRACTITIONER

## 2023-03-09 RX ORDER — AMLODIPINE BESYLATE 10 MG/1
10 TABLET ORAL DAILY
Qty: 30 TABLET | Refills: 5 | Status: SHIPPED | OUTPATIENT
Start: 2023-03-09 | End: 2023-10-09 | Stop reason: SDUPTHER

## 2023-03-09 NOTE — ASSESSMENT & PLAN NOTE
Patient aware that I cannot prescribe Lyrica. He will likely try to find an MD to take over his care for ongoing mgmt. No accepting pain mgmt providers locally  I have recommended complete alcohol cessation

## 2023-03-09 NOTE — PROGRESS NOTES
"  AN Mckeon   [facility]  [address] [address]      PATIENT NAME: Arcadio Scott  : 1960  DATE: 3/9/23  MRN: [id number]      Billing Provider: AN Mckeon  Level of Service:   Patient PCP Information       Provider PCP Type    AN Mckeon General            Reason for Visit / Chief Complaint: Follow-up (Lab review/Bilateral foot pain)       History of Present Illness / Problem Focused Workflow     Arcadio Scott presents to the clinic with Follow-up (Lab review/Bilateral foot pain)       Initial Visit 21: 61 y.o. AA [place] [facility].   Previous PCP: States previous pt of Dr. Sanjuana Briones. Last seen in the . States "I was never sick!"   PmHx: Alcoholic gastritis, Sciatica, left leg pain, past TBO (stopped 2019)   SHx: Denies   FHx: Lung cancer (dad), heart dz   Complaints today: Establish primary care. Denies any significant PMHx, alleging "I never get sick." Has had ED visits over the past few years for LE pain, sciatica. He did have an ED visit 10/2021 2/2 abd pain and vomiting. He was diagnosed with alcoholic gastritis at that visit. He admits ETOH use but no "hard liquor" since ED visit. States drinks ~1 bottle of wine of 6-pack of beer per week. He reports "pins and needles" sensation to left lower ext following a slip and fall incident while fishing 4 years ago. Also c/o mass to left face x 2 years that he wants removed. Denies ever undergoing colon cancer or prostate cancer screening. Father had lung cancer but was a smoker.    Telephone Visit (22): Pt presenting for f/u today. He was seen for an initial visit 21. He did not complete any wellness labs. H/o +FIT. Referred to GI lab 2021. He's since undergone a colonoscopy 2022 that revealed mx polyps (7 removed). No evidence of malignancy. Plans to repeat colonoscopy in 1 year. He is c/o "my leg " "still numb." These complaints were addressed at last visit. Pt was referred for an arterial US of LLE. Scheduled 3/17/22. Pt states not aware of appt, but wrote down appt details. He has no other concerns.    (2/16/22): Pt presenting for f/u today. He was seen for an initial visit 12/6/21. He completed wellness labs on Monday. Labs significant for: K+ 3.3. Pt reports experiencing some nausea and vomiting the day before. He has a h/o alcoholic gastritis. Takes Pepcid and Zofran as needed. He denies recent ETOH use, but admits a beer "here and there." Glucose one-teens and total cholesterol 230. He was not fasting. States ate some deer meat ~4 hrs prior to lab analysis. HgA1c 5.1%, WNL. PSA 0.50. TSH WNL. Platelet count improved. Denies abnl bleeding or bruising. Hepatitis panel and HIV screening negative. Syphilis ab reactive with a nonreactive RPR. Pt reports being diagnosed around 2009 and treated with pills under Dr. Briones. He is aware of appt for arterial US on 3/17/22. No other concerns.    (4/4/22): Pt presenting for f/u today. He had an arterial US of the BLE due to c/o LE paresthesias on 3/17/22 revealing: "The Doppler waveforms were triphasic at the right ankle.  The BG on the right is normal.  The TBI on the right is normal.  The Doppler waveforms were triphasic at the left ankle.  The BG on the left is normal.  The TBI on the left is normal.  No evidence of significant arterial insufficiency was identified on this study. The post exercise BG study was omitted due to impaired mobility."   Today, pt reports sharp, shooting pain starts in lower back kayleigh and radiates down to feet. States sometimes can't feel anything on his toes entirely. The only injury he recalls is a fall off of an excavator ~8 months ago where he landed on his right side. States didn't seek medical attention because he did not deem the fall was "bad." As time went on, he started to experience discomfort to his right lower back. He now " "ambulates with a walking cane. c/o paresthesias to BLE. c/o ED; states has not had intercourse in 8 mths. Denies B/B incontinence. He is working on obtaining STD as he has not been able to return to work due to impaired mobility and lower back pain with paresthesias. He has seen [facility] clinic for mx nodules to face. He's been referred to Plastic Sx/Sx clinic for eval. No other concerns.    Today's Visit (8/1/22): Pt presenting for f/u lower back pain with paresthesias to BLE/toes as above. At last visit, this complaint was discussed entirely. Past arterial US normal. He was started on Gabapentin in April 2022, then referred to undergo a MRI of L spine. This was ordered but never done. He was taking Gabapentin with minimal improvement. Informed patient that I was contacted by an occupational health provider after last visit regarding Gabapentin. Patient was being evaluated for a position. I was informed that patient passed the physical exam portion of his work-up, but the doctor later found he'd been prescribed Gabapentin. He would not be able to be approved for the position while on the Gabapentin. The physician was to speak with the patient regarding whether or not he would want to continue treatment with it. Pt states he did go for a physical and passed most of the exam, but is on hold due to "they found blood in my urine on my drug test." He's had trace RBCs on UA 10/13/21 and 5/26/21. Denies gross hematuria. Overall, he admits that he's been trying to "exercise more" by doing some household chores such as cleaning inside and mowing his lawn. Admits no longer needs to use a walking cane. No other concerns today.     12/8/22: Patient presents today after mx missed appts. States he's trying to received community assistance for rent payment from [employer] but needs a letter stating he's being followed by a doctor. He also talked about wanting to go back to work. As noted above, he was evaluated for an off shore " "position by an occupational health provider. He did pass a physical exam at the time, but was taking Gabapentin which excluded him from the position if he'd decided to stay on such. He is no longer taking Gabapentin. Previously referred to Dr. London for an EMG. EMG 8/12/22; diagnosed with sensory axonal polyneuropathy of both legs. He did get a script for Lyrica ("for my foot") from Dr. London but doesn't take regularly. He is no longer ambulatory with cane. He finished P.T. w/ Michelle in September. Bp slightly elevated. Asymptomatic. No other concerns.    3/9/23: Patient presenting for f/u/labs review. He was seen in GI 1/25/23. EGD & colonoscopy planned in September 2023. Lab review significant for:  03/07/23 12:06  Sodium: 143  Potassium: 2.8 (L)  Chloride: 97 (L)  CO2: 31  BUN: 6.1 (L)  Creatinine: 0.73  eGFR: >60  Glucose: 103  Calcium: 9.4  Alkaline Phosphatase: 70  PROTEIN TOTAL: 7.9 (H)  Albumin: 3.9  Albumin/Globulin Ratio: 1.0 (L)  BILIRUBIN TOTAL: 1.1  AST: 79 (H)  ALT: 18  Globulin, Total: 4.0 (H)    Patient was contacted about critical potassium on 3/9/23 and Rx was sent, however, he only obtained today. Asymptomatic at this time. He does have a h/o of heavy ETOH use.     He continues to c/o BLE pain and numbness/tingling. See above. Requesting refill on Lyrica.    He is now taking Amlodipine for HTN. BP at goal. No other concerns.      Review of Systems     Review of Systems   Respiratory:  Negative for shortness of breath, wheezing and stridor.    Cardiovascular:  Negative for chest pain, palpitations and leg swelling.   Neurological:  Positive for numbness. Negative for dizziness, facial asymmetry, light-headedness and headaches.   All other systems reviewed and are negative.    Medical / Social / Family History     Past Medical History:   Diagnosis Date    GERD (gastroesophageal reflux disease)     Neuropathy        Past Surgical History:   Procedure Laterality Date    COLONOSCOPY W/ POLYPECTOMY  " 01/28/2022    CYST REMOVAL      Facial    SURGICAL REMOVAL OF LESION OF FACE Bilateral 07/18/2022    Procedure: EXCISION, LESION, FACE AND NECK;  Surgeon: Marcelo Capps MD;  Location: [facility];  Service: ENT;  Laterality: Bilateral;       Social History    reports that he has been smoking cigars. He has never used smokeless tobacco. He reports current alcohol use of about 12.0 standard drinks per week. He reports that he does not use drugs.    Family History  's family history includes Alzheimer's disease in his maternal grandmother; Aneurysm in his sister; Cancer in his father; Diabetes in his brother and mother; Heart disease in his mother; Stomach cancer in his brother.    Medications and Allergies     Medications  Medication List with Changes/Refills   Current Medications    FAMOTIDINE (PEPCID) 40 MG TABLET    Take 1 tablet (40 mg total) by mouth nightly as needed for Heartburn.    PANTOPRAZOLE (PROTONIX) 40 MG TABLET    Take 1 tablet (40 mg total) by mouth once daily.    POTASSIUM CHLORIDE SA (K-DUR,KLOR-CON) 20 MEQ TABLET    Take 1 tablet (20 mEq total) by mouth 2 (two) times daily. for 7 days   Changed and/or Refilled Medications    Modified Medication Previous Medication    AMLODIPINE (NORVASC) 10 MG TABLET amLODIPine (NORVASC) 10 MG tablet       Take 1 tablet (10 mg total) by mouth once daily.    Take 1 tablet (10 mg total) by mouth once daily.   Discontinued Medications    PREGABALIN (LYRICA) 50 MG CAPSULE    Take 50 mg by mouth 2 (two) times daily.       Allergies  Review of patient's allergies indicates:  No Known Allergies    Physical Examination     Vitals:    03/09/23 1253   BP: 131/77   Pulse: 80   Resp: 20   Temp: 98.5 °F (36.9 °C)     Physical Exam  Constitutional:       Appearance: Normal appearance.   HENT:      Head: Normocephalic and atraumatic.   Cardiovascular:      Rate and Rhythm: Normal rate and regular rhythm.      Pulses: Normal pulses.      Heart sounds: Normal heart sounds.    Pulmonary:      Effort: Pulmonary effort is normal.      Breath sounds: Normal breath sounds.   Abdominal:      General: Bowel sounds are normal.      Palpations: Abdomen is soft.   Musculoskeletal:         General: Normal range of motion.      Cervical back: Normal range of motion.      Right lower leg: No edema.      Left lower leg: No edema.   Skin:     General: Skin is warm and dry.   Neurological:      General: No focal deficit present.      Mental Status: He is alert and oriented to person, place, and time.      Motor: No weakness.      Gait: Gait abnormal (ambulatory w/ cane).   Psychiatric:         Mood and Affect: Mood normal.         Behavior: Behavior normal.         Thought Content: Thought content normal.         Judgment: Judgment normal.         Results     Lab Results   Component Value Date    WBC 5.1 02/14/2022    RBC 4.80 02/14/2022    HGB 14.5 02/14/2022    HCT 43.1 02/14/2022    MCV 89.8 02/14/2022    MCH 30.2 02/14/2022    MCHC 33.6 02/14/2022    RDW 14.4 02/14/2022     02/14/2022    MPV 11.0 02/14/2022     CMP  Sodium Level   Date Value Ref Range Status   03/07/2023 143 136 - 145 mmol/L Final     Potassium Level   Date Value Ref Range Status   03/07/2023 2.8 (L) 3.5 - 5.1 mmol/L Final     Carbon Dioxide   Date Value Ref Range Status   03/07/2023 31 23 - 31 mmol/L Final     Blood Urea Nitrogen   Date Value Ref Range Status   03/07/2023 6.1 (L) 8.4 - 25.7 mg/dL Final     Creatinine   Date Value Ref Range Status   03/07/2023 0.73 0.73 - 1.18 mg/dL Final     Calcium Level Total   Date Value Ref Range Status   03/07/2023 9.4 8.8 - 10.0 mg/dL Final     Albumin Level   Date Value Ref Range Status   03/07/2023 3.9 3.4 - 4.8 g/dL Final     Bilirubin Total   Date Value Ref Range Status   03/07/2023 1.1 <=1.5 mg/dL Final     Alkaline Phosphatase   Date Value Ref Range Status   03/07/2023 70 40 - 150 unit/L Final     Aspartate Aminotransferase   Date Value Ref Range Status   03/07/2023 79 (H) 5 -  34 unit/L Final     Alanine Aminotransferase   Date Value Ref Range Status   03/07/2023 18 0 - 55 unit/L Final     Estimated GFR-Non    Date Value Ref Range Status   02/14/2022 >105 >=90      Lab Results   Component Value Date    CHOL 188 03/07/2023     Lab Results   Component Value Date    HDL 59 03/07/2023     No results found for: LDLCALC  Lab Results   Component Value Date    TRIG 109 03/07/2023     No results found for: CHOLHDL  Lab Results   Component Value Date    TSH 0.5815 02/14/2022     Lab Results   Component Value Date    PHUR 6.0 10/13/2021    PROTEINUA 1+ (A) 03/07/2023    GLUCUA 50 (A) 10/13/2021    KETONESU 100 (A) 10/13/2021    OCCULTUA 0.5 10/13/2021    NITRITE Negative 10/13/2021    LEUKOCYTESUR Negative 03/07/2023           Assessment and Plan (including Health Maintenance)     Plan:         Health Maintenance Due   Topic Date Due    COVID-19 Vaccine (1) Never done    Pneumococcal Vaccines (Age 0-64) (1 - PCV) Never done    TETANUS VACCINE  Never done    Colorectal Cancer Screening  Never done    Shingles Vaccine (1 of 2) Never done    Influenza Vaccine (1) Never done       Problem List Items Addressed This Visit          Neuro    Peripheral sensory-motor axonal polyneuropathy    Current Assessment & Plan     Patient aware that I cannot prescribe Lyrica. He will likely try to find an MD to take over his care for ongoing mgmt. No accepting pain mgmt providers locally  I have recommended complete alcohol cessation            Psychiatric    Alcohol use    Current Assessment & Plan     Recommend cessation to prevent further lyte imbalances and worsening neuropathy            Cardiac/Vascular    Hypertension    Current Assessment & Plan     At goal  Continue regimen  DASH         Relevant Medications    amLODIPine (NORVASC) 10 MG tablet       Renal/    Hypokalemia    Current Assessment & Plan     Take potassium chloride as prescribed  Avoid ETOH  BMP in 1-2 weeks          Microhematuria    Current Assessment & Plan     Refer to Uro         Relevant Orders    Ambulatory referral/consult to Urology       GI    Transaminitis    Current Assessment & Plan     Likely 2/2 ETOH use recently. STOP  Will monitor  Also following GI  EGD/Colonoscopy planned 9/2023            Other    Wellness examination    Overview     Colon Cancer Screening: Colonoscopy 1/28/2022 that revealed mx polyps (7 removed). No evidence of malignancy. Plans to repeat colonoscopy in 1 year  Prostate Cancer Screening Latest Reference Range & Units 03/07/23 12:06   PSA, Screen <=4.00 ng/mL 0.82             Health Maintenance Topics with due status: Not Due       Topic Last Completion Date    Hemoglobin A1c (Diabetic Prevention Screening) 02/14/2022    Lipid Panel 03/07/2023       Future Appointments   Date Time Provider Department Center   4/11/2023  1:30 PM KENDY [facility] [place]   10/9/2023  8:15 AM AN Mckeon FELIS[facility] [place] [facility]        Signature:  AN Mckeon  [facility]  [address] [facility] [id number]    Date of encounter: 3/9/23

## 2023-03-09 NOTE — ASSESSMENT & PLAN NOTE
Likely 2/2 ETOH use recently. STOP  Will monitor  Also following GI  EGD/Colonoscopy planned 9/2023

## 2023-04-12 ENCOUNTER — OFFICE VISIT (OUTPATIENT)
Dept: NEUROLOGY | Facility: CLINIC | Age: 63
End: 2023-04-12
Payer: MEDICAID

## 2023-04-12 ENCOUNTER — LAB VISIT (OUTPATIENT)
Dept: LAB | Facility: HOSPITAL | Age: 63
End: 2023-04-12
Attending: NURSE PRACTITIONER
Payer: MEDICAID

## 2023-04-12 VITALS
OXYGEN SATURATION: 96 % | WEIGHT: 208.19 LBS | BODY MASS INDEX: 29.8 KG/M2 | TEMPERATURE: 99 F | HEART RATE: 81 BPM | HEIGHT: 70 IN | SYSTOLIC BLOOD PRESSURE: 128 MMHG | RESPIRATION RATE: 14 BRPM | DIASTOLIC BLOOD PRESSURE: 76 MMHG

## 2023-04-12 DIAGNOSIS — G60.8 PERIPHERAL SENSORY-MOTOR AXONAL POLYNEUROPATHY: Primary | ICD-10-CM

## 2023-04-12 DIAGNOSIS — G60.8 PERIPHERAL SENSORY-MOTOR AXONAL POLYNEUROPATHY: ICD-10-CM

## 2023-04-12 DIAGNOSIS — R29.3 ABNORMAL POSTURE: ICD-10-CM

## 2023-04-12 DIAGNOSIS — G62.9 POLYNEUROPATHY: ICD-10-CM

## 2023-04-12 LAB
EST. AVERAGE GLUCOSE BLD GHB EST-MCNC: 96.8 MG/DL
FOLATE SERPL-MCNC: 4.6 NG/ML (ref 7–31.4)
HBA1C MFR BLD: 5 %
HIV 1+2 AB+HIV1 P24 AG SERPL QL IA: NONREACTIVE
IGA SERPL-MCNC: 428 MG/DL (ref 101–645)
IGG SERPL-MCNC: 1612 MG/DL (ref 540–1822)
IGM SERPL-MCNC: 160 MG/DL (ref 22–240)
T PALLIDUM AB SER QL: REACTIVE
T4 FREE SERPL-MCNC: 0.94 NG/DL (ref 0.7–1.48)
TSH SERPL-ACNC: 0.49 UIU/ML (ref 0.35–4.94)
VIT B12 SERPL-MCNC: 650 PG/ML (ref 213–816)

## 2023-04-12 PROCEDURE — 84443 ASSAY THYROID STIM HORMONE: CPT

## 2023-04-12 PROCEDURE — 86592 SYPHILIS TEST NON-TREP QUAL: CPT

## 2023-04-12 PROCEDURE — 3074F PR MOST RECENT SYSTOLIC BLOOD PRESSURE < 130 MM HG: ICD-10-PCS | Mod: CPTII,,, | Performed by: NURSE PRACTITIONER

## 2023-04-12 PROCEDURE — 99215 OFFICE O/P EST HI 40 MIN: CPT | Mod: S$PBB,,, | Performed by: NURSE PRACTITIONER

## 2023-04-12 PROCEDURE — 82746 ASSAY OF FOLIC ACID SERUM: CPT

## 2023-04-12 PROCEDURE — 3074F SYST BP LT 130 MM HG: CPT | Mod: CPTII,,, | Performed by: NURSE PRACTITIONER

## 2023-04-12 PROCEDURE — 83521 IG LIGHT CHAINS FREE EACH: CPT | Mod: 90

## 2023-04-12 PROCEDURE — 86334 IMMUNOFIX E-PHORESIS SERUM: CPT

## 2023-04-12 PROCEDURE — 1159F PR MEDICATION LIST DOCUMENTED IN MEDICAL RECORD: ICD-10-PCS | Mod: CPTII,,, | Performed by: NURSE PRACTITIONER

## 2023-04-12 PROCEDURE — 99214 OFFICE O/P EST MOD 30 MIN: CPT | Mod: PBBFAC | Performed by: NURSE PRACTITIONER

## 2023-04-12 PROCEDURE — 3078F DIAST BP <80 MM HG: CPT | Mod: CPTII,,, | Performed by: NURSE PRACTITIONER

## 2023-04-12 PROCEDURE — 3008F BODY MASS INDEX DOCD: CPT | Mod: CPTII,,, | Performed by: NURSE PRACTITIONER

## 2023-04-12 PROCEDURE — 1160F PR REVIEW ALL MEDS BY PRESCRIBER/CLIN PHARMACIST DOCUMENTED: ICD-10-PCS | Mod: CPTII,,, | Performed by: NURSE PRACTITIONER

## 2023-04-12 PROCEDURE — 36415 COLL VENOUS BLD VENIPUNCTURE: CPT

## 2023-04-12 PROCEDURE — 83036 HEMOGLOBIN GLYCOSYLATED A1C: CPT

## 2023-04-12 PROCEDURE — 1159F MED LIST DOCD IN RCRD: CPT | Mod: CPTII,,, | Performed by: NURSE PRACTITIONER

## 2023-04-12 PROCEDURE — 99215 PR OFFICE/OUTPT VISIT, EST, LEVL V, 40-54 MIN: ICD-10-PCS | Mod: S$PBB,,, | Performed by: NURSE PRACTITIONER

## 2023-04-12 PROCEDURE — 3008F PR BODY MASS INDEX (BMI) DOCUMENTED: ICD-10-PCS | Mod: CPTII,,, | Performed by: NURSE PRACTITIONER

## 2023-04-12 PROCEDURE — 86780 TREPONEMA PALLIDUM: CPT

## 2023-04-12 PROCEDURE — 82607 VITAMIN B-12: CPT

## 2023-04-12 PROCEDURE — 82784 ASSAY IGA/IGD/IGG/IGM EACH: CPT

## 2023-04-12 PROCEDURE — 3078F PR MOST RECENT DIASTOLIC BLOOD PRESSURE < 80 MM HG: ICD-10-PCS | Mod: CPTII,,, | Performed by: NURSE PRACTITIONER

## 2023-04-12 PROCEDURE — 87389 HIV-1 AG W/HIV-1&-2 AB AG IA: CPT

## 2023-04-12 PROCEDURE — 84165 PROTEIN E-PHORESIS SERUM: CPT

## 2023-04-12 PROCEDURE — 1160F RVW MEDS BY RX/DR IN RCRD: CPT | Mod: CPTII,,, | Performed by: NURSE PRACTITIONER

## 2023-04-12 PROCEDURE — 84439 ASSAY OF FREE THYROXINE: CPT

## 2023-04-12 NOTE — PROGRESS NOTES
[facility] Neurology Follow Up Visit Note    Initial Visit Date: 1/10/2023  Current Visit Date:  04/12/2023    Chief Complaint:     Chief Complaint   Patient presents with    Peripheral Neuropathy     Rates pain at a 9.5 now; Complains mainly left sided numbness; Therapy did not help       History of Present Illness:      This is 62 y.o. male with history of chronic lower back pain, alcohol dependence who was referred for bilateral lower extremity numbness for 1 + year. Patient states that he had bilateral LBP radiating down bilateral LE, L>R. He reports decreased sensation in bilateral feet, left worse than right. Denied any trip and falls. Reports bilateral knee pain, left > right. Drinks alcohol throughout the week, drinking around a case of beer every 2 weeks. Quit smoking in 11/2019. Denied any saddle anesthesia. Denied any urinary incontinence. 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      Today, Pt states he continues w/ constant numbness/cold/shocking feeling to feet. Intermittent burning/tingling. Improved w/elevating legs. Sometimes worsens with activity. Denies falls, interferes w/ADLs. Has old Rx for Lyrica, but states it was not effective. MRI L-spine and labs not completed. [facility] would not pay for MRI.    Medications:     Current Outpatient Medications on File Prior to Visit   Medication Sig Dispense Refill    amLODIPine (NORVASC) 10 MG tablet Take 1 tablet (10 mg total) by mouth once daily. 30 tablet 5    famotidine (PEPCID) 40 MG tablet Take 1 tablet (40 mg total) by mouth nightly as needed for Heartburn. 90 tablet 1    pantoprazole (PROTONIX) 40 MG tablet Take 1 tablet (40 mg total) by mouth once daily. 30 tablet 11     Current Facility-Administered Medications on File Prior to Visit   Medication Dose Route Frequency Provider Last Rate Last Admin    LIDOcaine (PF) 10 mg/ml (1%) injection 10 mg  1 mL Intradermal Once AN [place]           Labs:     Results for orders placed or  performed in visit on 03/07/23   PSA, Screening   Result Value Ref Range    Prostate Specific Antigen 0.82 <=4.00 ng/mL   Lipid Panel   Result Value Ref Range    Cholesterol Total 188 <=200 mg/dL    HDL Cholesterol 59 35 - 60 mg/dL    Triglyceride 109 34 - 140 mg/dL    Cholesterol/HDL Ratio 3 0 - 5    Very Low Density Lipoprotein 22     LDL Cholesterol 107.00 50.00 - 140.00 mg/dL   Hepatitis Panel, Acute   Result Value Ref Range    Hepatitis A IgM Nonreactive Nonreactive    Hepatitis B Core IgM Nonreactive Nonreactive    Hepatitis B Surface Antigen Nonreactive Nonreactive    Hepatitis C Antibody Nonreactive Nonreactive   Comprehensive Metabolic Panel   Result Value Ref Range    Sodium Level 143 136 - 145 mmol/L    Potassium Level 2.8 (L) 3.5 - 5.1 mmol/L    Chloride 97 (L) 98 - 107 mmol/L    Carbon Dioxide 31 23 - 31 mmol/L    Glucose Level 103 82 - 115 mg/dL    Blood Urea Nitrogen 6.1 (L) 8.4 - 25.7 mg/dL    Creatinine 0.73 0.73 - 1.18 mg/dL    Calcium Level Total 9.4 8.8 - 10.0 mg/dL    Protein Total 7.9 (H) 5.8 - 7.6 gm/dL    Albumin Level 3.9 3.4 - 4.8 g/dL    Globulin 4.0 (H) 2.4 - 3.5 gm/dL    Albumin/Globulin Ratio 1.0 (L) 1.1 - 2.0 ratio    Bilirubin Total 1.1 <=1.5 mg/dL    Alkaline Phosphatase 70 40 - 150 unit/L    Alanine Aminotransferase 18 0 - 55 unit/L    Aspartate Aminotransferase 79 (H) 5 - 34 unit/L    eGFR >60 mls/min/1.73/m2   Pathologist Interpretation   Result Value Ref Range    Pathology Review       No serological evidence of recent or past hepatitis-A, B or C infection.    Eliseo Perry MD       Studies:      MRI L-spine without contrast: Pending.    NCS/EMG bilateral LE 8/12/2022 by Dr. Marck London:   I have reviewed the study independently and with the patient. Incomplete study.  Also, no reference range.  No conduction velocity.  Latency and amplitude of SNAP and CMAP mostly symmetric without reference range.  No F-waves.     Review of Systems:     Review of Systems   All other systems  reviewed and are negative.    Physical Exams:     Vitals:    04/12/23 1314   BP: 128/76   Pulse: 81   Resp: 14   Temp: 98.7 °F (37.1 °C)       Physical Exam  Vitals and nursing note reviewed.   Constitutional:       Appearance: Normal appearance.   HENT:      Head: Normocephalic and atraumatic.      Nose: Nose normal.      Mouth/Throat:      Mouth: Mucous membranes are moist.      Pharynx: Oropharynx is clear.   Eyes:      Conjunctiva/sclera: Conjunctivae normal.   Cardiovascular:      Rate and Rhythm: Normal rate and regular rhythm.      Pulses: Normal pulses.      Heart sounds: Normal heart sounds.   Pulmonary:      Effort: Pulmonary effort is normal.      Breath sounds: Normal breath sounds.   Abdominal:      General: Abdomen is flat.      Palpations: Abdomen is soft.   Musculoskeletal:         General: Normal range of motion.      Cervical back: Normal range of motion.   Neurological:      Mental Status: He is alert.   Psychiatric:         Mood and Affect: Mood normal.     Comprehensive Neurological Exam:  Mental Status: Alert Oriented to Self, Date, and Place. Comprehension wnl. No dysarthria.   CN II - XII: DUARTE, No APD, VFFC, No ptosis OU, EOMI without nystagmus, LT/Temp symmetric in CN V1-3 distribution, Hearing grossly intact, Face Symmetric, Tongue and Uvula midline, Trapezius symmetric bilateral.   Motor: tone and bulk wnl throughout, no abnormal involuntary or voluntary movements, 5/5 to confrontation, Fine finger movements wnl b/l, No pronator drift.   Sensory: LT, Proprioception, Vibration, PP, Temp symmetric subjectively decreased in bilateral LE throughout.   Reflexes: 2+ throughout except for 1+ in bilateral AJ, plantar reflexes downward bilateral.   Cerebellar: FNF wnl b/l, RAHM wnl b/l.  Romberg: Negative  Gait: antalgic gait, stooped posture    Assessment:     This is 62 y.o. male with history of chronic lower back pain, alcohol dependence who was referred for bilateral lower extremity numbness.  NCS/EMG results were inconclusive due to missing components based on report. Exam and history more consistent with lumbar radiculopathy     Problem List Items Addressed This Visit          Neuro    Polyneuropathy    Peripheral sensory-motor axonal polyneuropathy - Primary    Relevant Orders    Hemoglobin A1C    Folate    TSH    T4, Free    Vitamin B12    SYPHILIS ANTIBODY (WITH REFLEX RPR)    HIV 1/2 Ag/Ab (4th Gen)    Immunofixation & Protein Electrophoresis & Immunoglobulins     Other Visit Diagnoses       Abnormal posture        Relevant Orders    CT Lumbar Spine Without Contrast          Plan:     [] order CT L-spine  [] re order polyneuropathy labs.   [] may return to work with fall precautions    RTC 3 Months     I have explained the treatment plan, diagnosis, and prognosis to patient. All questions are answered to the best of my knowledge.     Face to face time 40 minutes, including documentation, chart review, counseling, education, review of test results, relevant medical records, and coordination of care.     I have explained the treatment plan, diagnosis, and prognosis to patient. All questions are answered to the best of my knowledge.     04/12/2023

## 2023-04-13 LAB
ALBUMIN % SPEP (OHS): 49.26
ALBUMIN SERPL-MCNC: 4.1 G/DL (ref 3.4–4.8)
ALBUMIN/GLOB SERPL: 1 RATIO (ref 1.1–2)
ALPHA 1 GLOB (OHS): 0.26 GM/DL
ALPHA 1 GLOB% (OHS): 3.16
ALPHA 2 GLOB % (OHS): 9.27
ALPHA 2 GLOB (OHS): 0.77 GM/DL
BETA GLOB (OHS): 1.36 GM/DL
BETA GLOB% (OHS): 16.42
GAMMA GLOBULIN % (OHS): 21.89
GAMMA GLOBULIN (OHS): 1.82 GM/DL
GLOBULIN SER-MCNC: 4.2 GM/DL (ref 2.4–3.5)
M SPIKE % (OHS): ABNORMAL
M SPIKE (OHS): ABNORMAL
PATH REV: NORMAL
PROT SERPL-MCNC: 8.3 GM/DL (ref 5.8–7.6)
RPR SER QL: REACTIVE
RPR SER-TITR: ABNORMAL {TITER}

## 2023-04-14 LAB
KAPPA LC FREE SER-MCNC: 4.03 MG/DL (ref 0.33–1.94)
KAPPA LC FREE/LAMBDA FREE SER: 1.32 {RATIO} (ref 0.26–1.65)
LAMBDA LC FREE SERPL-MCNC: 3.06 MG/DL (ref 0.57–2.63)

## 2023-04-18 ENCOUNTER — TELEPHONE (OUTPATIENT)
Dept: NEUROLOGY | Facility: CLINIC | Age: 63
End: 2023-04-18
Payer: MEDICAID

## 2023-04-25 ENCOUNTER — TELEPHONE (OUTPATIENT)
Dept: INTERNAL MEDICINE | Facility: CLINIC | Age: 63
End: 2023-04-25
Payer: MEDICAID

## 2023-04-25 ENCOUNTER — OFFICE VISIT (OUTPATIENT)
Dept: UROLOGY | Facility: CLINIC | Age: 63
End: 2023-04-25
Payer: MEDICAID

## 2023-04-25 VITALS
RESPIRATION RATE: 20 BRPM | HEART RATE: 82 BPM | WEIGHT: 200.81 LBS | BODY MASS INDEX: 28.75 KG/M2 | SYSTOLIC BLOOD PRESSURE: 119 MMHG | HEIGHT: 70 IN | DIASTOLIC BLOOD PRESSURE: 73 MMHG | TEMPERATURE: 98 F | OXYGEN SATURATION: 98 %

## 2023-04-25 DIAGNOSIS — R31.29 MICROHEMATURIA: ICD-10-CM

## 2023-04-25 PROCEDURE — 3078F PR MOST RECENT DIASTOLIC BLOOD PRESSURE < 80 MM HG: ICD-10-PCS | Mod: CPTII,,, | Performed by: NURSE PRACTITIONER

## 2023-04-25 PROCEDURE — 99214 PR OFFICE/OUTPT VISIT, EST, LEVL IV, 30-39 MIN: ICD-10-PCS | Mod: S$PBB,,, | Performed by: NURSE PRACTITIONER

## 2023-04-25 PROCEDURE — 1160F PR REVIEW ALL MEDS BY PRESCRIBER/CLIN PHARMACIST DOCUMENTED: ICD-10-PCS | Mod: CPTII,,, | Performed by: NURSE PRACTITIONER

## 2023-04-25 PROCEDURE — 3074F PR MOST RECENT SYSTOLIC BLOOD PRESSURE < 130 MM HG: ICD-10-PCS | Mod: CPTII,,, | Performed by: NURSE PRACTITIONER

## 2023-04-25 PROCEDURE — 3074F SYST BP LT 130 MM HG: CPT | Mod: CPTII,,, | Performed by: NURSE PRACTITIONER

## 2023-04-25 PROCEDURE — 3008F BODY MASS INDEX DOCD: CPT | Mod: CPTII,,, | Performed by: NURSE PRACTITIONER

## 2023-04-25 PROCEDURE — 1160F RVW MEDS BY RX/DR IN RCRD: CPT | Mod: CPTII,,, | Performed by: NURSE PRACTITIONER

## 2023-04-25 PROCEDURE — 3078F DIAST BP <80 MM HG: CPT | Mod: CPTII,,, | Performed by: NURSE PRACTITIONER

## 2023-04-25 PROCEDURE — 1159F MED LIST DOCD IN RCRD: CPT | Mod: CPTII,,, | Performed by: NURSE PRACTITIONER

## 2023-04-25 PROCEDURE — 99214 OFFICE O/P EST MOD 30 MIN: CPT | Mod: PBBFAC | Performed by: NURSE PRACTITIONER

## 2023-04-25 PROCEDURE — 1159F PR MEDICATION LIST DOCUMENTED IN MEDICAL RECORD: ICD-10-PCS | Mod: CPTII,,, | Performed by: NURSE PRACTITIONER

## 2023-04-25 PROCEDURE — 99214 OFFICE O/P EST MOD 30 MIN: CPT | Mod: S$PBB,,, | Performed by: NURSE PRACTITIONER

## 2023-04-25 PROCEDURE — 3008F PR BODY MASS INDEX (BMI) DOCUMENTED: ICD-10-PCS | Mod: CPTII,,, | Performed by: NURSE PRACTITIONER

## 2023-04-25 NOTE — PROGRESS NOTES
Chief Complaint:   Chief Complaint   Patient presents with    [facility] [place]       HPI:  Patient is a 62-year-old male referred to Urology for microscopic hematuria.  Patient treated by PCP with a random UA noted to have trace RBCs and a urine sample.Today patient denies dysuria, urinary urgency, frequency, incontinence, retention, gross hematuria, nocturia. Patient denies family history of urologic pathology.         Allergies:  Review of patient's allergies indicates:  No Known Allergies    Medications:  Current Outpatient Medications   Medication Sig Dispense Refill    amLODIPine (NORVASC) 10 MG tablet Take 1 tablet (10 mg total) by mouth once daily. 30 tablet 5    pantoprazole (PROTONIX) 40 MG tablet Take 1 tablet (40 mg total) by mouth once daily. 30 tablet 11    famotidine (PEPCID) 40 MG tablet Take 1 tablet (40 mg total) by mouth nightly as needed for Heartburn. 90 tablet 1     No current facility-administered medications for this visit.     Facility-Administered Medications Ordered in Other Visits   Medication Dose Route Frequency Provider Last Rate Last Admin    LIDOcaine (PF) 10 mg/ml (1%) injection 10 mg  1 mL Intradermal Once AN [place]           Review of Systems:  General: No fever, chills, fatigability, or weight loss.  Skin: No rashes, itching, or changes in color or texture of skin.  Chest: Denies GRANT, cyanosis, wheezing, cough, and sputum production.  Abdomen: Appetite fine. No weight loss. Denies diarrhea, abdominal pain, hematemesis, or blood in stool.  Musculoskeletal: No joint stiffness or swelling. Denies back pain.  : As above.  All other review of systems negative.    PMH:  Past Medical History:   Diagnosis Date    GERD (gastroesophageal reflux disease)     Neuropathy        PSH:  Past Surgical History:   Procedure Laterality Date    COLONOSCOPY W/ POLYPECTOMY  01/28/2022    CYST REMOVAL      Facial    SURGICAL REMOVAL OF LESION OF FACE Bilateral 07/18/2022    Procedure: EXCISION,  LESION, FACE AND NECK;  Surgeon: Marcelo Capps MD;  Location: [facility];  Service: ENT;  Laterality: Bilateral;       FamHx:  Family History   Problem Relation Age of Onset    Diabetes Mother     Heart disease Mother     Cancer Father         Unsure of source    Aneurysm Sister     Diabetes Brother     Stomach cancer Brother     Alzheimer's disease Maternal Grandmother        SocHx:  Social History     Socioeconomic History    Marital status: Single    Number of children: 0   Tobacco Use    Smoking status: Some Days     Types: Cigars     Passive exposure: Current    Smokeless tobacco: Never    Tobacco comments:     1-2 times a week   Substance and Sexual Activity    Alcohol use: Yes     Alcohol/week: 12.0 standard drinks     Types: 12 Cans of beer per week     Comment: 4 beers 3 times per week    Drug use: Never     Social Determinants of Health     Financial Resource Strain: High Risk    Difficulty of Paying Living Expenses: Very hard   Food Insecurity: No Food Insecurity    Worried About Running Out of Food in the Last Year: Never true    Ran Out of Food in the Last Year: Never true   Transportation Needs: No Transportation Needs    Lack of Transportation (Medical): No    Lack of Transportation (Non-Medical): No   Physical Activity: Inactive    Days of Exercise per Week: 0 days    Minutes of Exercise per Session: 0 min   Stress: No Stress Concern Present    Feeling of Stress : Only a little   Social Connections: Socially Isolated    Frequency of Communication with Friends and Family: Three times a week    Frequency of Social Gatherings with Friends and Family: Never    Attends Denominational Services: Never    Active Member of Clubs or Organizations: No    Attends Club or Organization Meetings: Never    Marital Status: Never    Housing Stability: High Risk    Unable to Pay for Housing in the Last Year: Yes    Number of Places Lived in the Last Year: 1    Unstable Housing in the Last Year: No       Physical  Exam:  Vitals:    04/25/23 1328   BP: 119/73   Pulse: 82   Resp: 20   Temp: 98.4 °F (36.9 °C)     General: A&Ox3, no apparent distress, no deformities  Neck: No masses, normal thyroid  Lungs: CTA kayleigh, no use of accessory muscles  Heart: RRR, no arrhythmias  Abdomen: Soft, NT, ND, no masses, no hernias, no hepatosplenomegaly  Lymphatic: Neck and groin nodes negative  Skin: The skin is warm and dry. No jaundice.  Ext: No c/c/e.    GUMale: Test desc kayleigh, no abnormalities of epididymus. Penis, with normal penile and scrotal skin. Meatus normal. Normal rectal tone, no hemorrhoids. Prostate: 2 finger breadth wide, flat, smooth, soft, nontender, no nodules or masses appreciated. SV not palpable. Perineum and anus normal.        Impression:  Microscopic hematuria      Plan: Instructed patient will get a CT of the abdomen pelvis with oral contrast to set up patient for a cystoscope next available.

## 2023-04-26 NOTE — TELEPHONE ENCOUNTER
----- Message from Shanice Toth sent at 4/25/2023  2:43 PM CDT -----  Regarding: MEDICATION  Patient requesting gabapentin for leg/feet pain. [facility]    
Patient informed medication was discontinue on 08/01/2022 and to contact Neuro. Patient voiced understanding.  
This was discontinued after a neurologist prescribed him Lyrica. Can follow-up with Neuro.  
Partially impaired: cannot see medication labels or newsprint, but can see obstacles in path, and the surrounding layout; can count fingers at arm's length

## 2023-05-03 ENCOUNTER — HOSPITAL ENCOUNTER (OUTPATIENT)
Dept: RADIOLOGY | Facility: HOSPITAL | Age: 63
Discharge: HOME | End: 2023-05-03
Attending: NURSE PRACTITIONER
Payer: MEDICAID

## 2023-05-03 DIAGNOSIS — R31.29 MICROHEMATURIA: ICD-10-CM

## 2023-05-03 LAB
CREAT SERPL-MCNC: 0.98 MG/DL (ref 0.73–1.18)
GFR SERPLBLD CREATININE-BSD FMLA CKD-EPI: >60 MLS/MIN/1.73/M2

## 2023-05-03 PROCEDURE — 82565 ASSAY OF CREATININE: CPT | Performed by: NURSE PRACTITIONER

## 2023-05-03 PROCEDURE — 25500020 PHARM REV CODE 255: Performed by: NURSE PRACTITIONER

## 2023-05-03 PROCEDURE — 74178 CT ABD&PLV WO CNTR FLWD CNTR: CPT | Mod: TC

## 2023-05-03 RX ADMIN — IOHEXOL 100 ML: 350 INJECTION, SOLUTION INTRAVENOUS at 01:05

## 2023-05-15 DIAGNOSIS — R39.12 BENIGN PROSTATIC HYPERPLASIA WITH WEAK URINARY STREAM: Primary | ICD-10-CM

## 2023-05-15 DIAGNOSIS — N40.1 BENIGN PROSTATIC HYPERPLASIA WITH WEAK URINARY STREAM: Primary | ICD-10-CM

## 2023-05-15 RX ORDER — TAMSULOSIN HYDROCHLORIDE 0.4 MG/1
0.4 CAPSULE ORAL DAILY
Qty: 90 CAPSULE | Refills: 3 | Status: SHIPPED | OUTPATIENT
Start: 2023-05-15 | End: 2024-05-14

## 2023-05-30 ENCOUNTER — OFFICE VISIT (OUTPATIENT)
Dept: UROLOGY | Facility: CLINIC | Age: 63
End: 2023-05-30
Payer: MEDICAID

## 2023-05-30 VITALS
BODY MASS INDEX: 29.07 KG/M2 | SYSTOLIC BLOOD PRESSURE: 134 MMHG | WEIGHT: 203.06 LBS | HEIGHT: 70 IN | OXYGEN SATURATION: 100 % | TEMPERATURE: 99 F | HEART RATE: 71 BPM | DIASTOLIC BLOOD PRESSURE: 87 MMHG | RESPIRATION RATE: 20 BRPM

## 2023-05-30 DIAGNOSIS — R31.29 MICROHEMATURIA: Primary | ICD-10-CM

## 2023-05-30 LAB
APPEARANCE UR: CLEAR
BACTERIA #/AREA URNS AUTO: NORMAL /HPF
BILIRUB UR QL STRIP.AUTO: NEGATIVE MG/DL
COLOR UR: NORMAL
GLUCOSE UR QL STRIP.AUTO: NORMAL MG/DL
HYALINE CASTS #/AREA URNS LPF: NORMAL /LPF
KETONES UR QL STRIP.AUTO: NEGATIVE MG/DL
LEUKOCYTE ESTERASE UR QL STRIP.AUTO: NEGATIVE UNIT/L
NITRITE UR QL STRIP.AUTO: NEGATIVE
PH UR STRIP.AUTO: 5.5 [PH]
PROT UR QL STRIP.AUTO: NEGATIVE MG/DL
RBC #/AREA URNS AUTO: NORMAL /HPF
RBC UR QL AUTO: NEGATIVE UNIT/L
SP GR UR STRIP.AUTO: 1.01
SQUAMOUS #/AREA URNS LPF: NORMAL /HPF
UROBILINOGEN UR STRIP-ACNC: NORMAL MG/DL
WBC #/AREA URNS AUTO: NORMAL /HPF

## 2023-05-30 PROCEDURE — 3079F DIAST BP 80-89 MM HG: CPT | Mod: CPTII,,, | Performed by: NURSE PRACTITIONER

## 2023-05-30 PROCEDURE — 1159F PR MEDICATION LIST DOCUMENTED IN MEDICAL RECORD: ICD-10-PCS | Mod: CPTII,,, | Performed by: NURSE PRACTITIONER

## 2023-05-30 PROCEDURE — 1160F PR REVIEW ALL MEDS BY PRESCRIBER/CLIN PHARMACIST DOCUMENTED: ICD-10-PCS | Mod: CPTII,,, | Performed by: NURSE PRACTITIONER

## 2023-05-30 PROCEDURE — 3008F BODY MASS INDEX DOCD: CPT | Mod: CPTII,,, | Performed by: NURSE PRACTITIONER

## 2023-05-30 PROCEDURE — 3075F PR MOST RECENT SYSTOLIC BLOOD PRESS GE 130-139MM HG: ICD-10-PCS | Mod: CPTII,,, | Performed by: NURSE PRACTITIONER

## 2023-05-30 PROCEDURE — 1159F MED LIST DOCD IN RCRD: CPT | Mod: CPTII,,, | Performed by: NURSE PRACTITIONER

## 2023-05-30 PROCEDURE — 99213 OFFICE O/P EST LOW 20 MIN: CPT | Mod: PBBFAC | Performed by: NURSE PRACTITIONER

## 2023-05-30 PROCEDURE — 1160F RVW MEDS BY RX/DR IN RCRD: CPT | Mod: CPTII,,, | Performed by: NURSE PRACTITIONER

## 2023-05-30 PROCEDURE — 99213 OFFICE O/P EST LOW 20 MIN: CPT | Mod: S$PBB,,, | Performed by: NURSE PRACTITIONER

## 2023-05-30 PROCEDURE — 3075F SYST BP GE 130 - 139MM HG: CPT | Mod: CPTII,,, | Performed by: NURSE PRACTITIONER

## 2023-05-30 PROCEDURE — 3079F PR MOST RECENT DIASTOLIC BLOOD PRESSURE 80-89 MM HG: ICD-10-PCS | Mod: CPTII,,, | Performed by: NURSE PRACTITIONER

## 2023-05-30 PROCEDURE — 99213 PR OFFICE/OUTPT VISIT, EST, LEVL III, 20-29 MIN: ICD-10-PCS | Mod: S$PBB,,, | Performed by: NURSE PRACTITIONER

## 2023-05-30 PROCEDURE — 81001 URINALYSIS AUTO W/SCOPE: CPT | Performed by: NURSE PRACTITIONER

## 2023-05-30 PROCEDURE — 3008F PR BODY MASS INDEX (BMI) DOCUMENTED: ICD-10-PCS | Mod: CPTII,,, | Performed by: NURSE PRACTITIONER

## 2023-05-30 NOTE — PROGRESS NOTES
Placed in room. Seen by Dennis Rodriguez NP. Spoke with patient. F/U [facility]. U/A obtained and sent to lab,for C&S.

## 2023-05-30 NOTE — PROGRESS NOTES
Chief Complaint:   Chief Complaint   [place]    [facility] [place]       HPI: Patient is a 62-year-old male three-month follow-up for microscopic hematuria.  Patient treated by PCP with a random UA noted to have trace RBCs and a urine sample.  Today patient denies any urological symptoms of dysuria, urinary frequency, urinary urgency, urinary retention, urinary incontinence, gross hematuria, difficulty urinating.    Allergies:  Review of patient's allergies indicates:  No Known Allergies    Medications:  Current Outpatient Medications   Medication Sig Dispense Refill    amLODIPine (NORVASC) 10 MG tablet Take 1 tablet (10 mg total) by mouth once daily. 30 tablet 5    pantoprazole (PROTONIX) 40 MG tablet Take 1 tablet (40 mg total) by mouth once daily. 30 tablet 11    tamsulosin (FLOMAX) 0.4 mg Cap Take 1 capsule (0.4 mg total) by mouth once daily. 90 capsule 3    famotidine (PEPCID) 40 MG tablet Take 1 tablet (40 mg total) by mouth nightly as needed for Heartburn. 90 tablet 1     No current facility-administered medications for this visit.     Facility-Administered Medications Ordered in Other Visits   Medication Dose Route Frequency Provider Last Rate Last Admin    LIDOcaine (PF) 10 mg/ml (1%) injection 10 mg  1 mL Intradermal Once AN [place]           Review of Systems:  General: No fever, chills, fatigability, or weight loss.  Skin: No rashes, itching, or changes in color or texture of skin.  Chest: Denies GRANT, cyanosis, wheezing, cough, and sputum production.  Abdomen: Appetite fine. No weight loss. Denies diarrhea, abdominal pain, hematemesis, or blood in stool.  Musculoskeletal: No joint stiffness or swelling. Denies back pain.  : As above.  All other review of systems negative.    PMH:  Past Medical History:   Diagnosis Date    GERD (gastroesophageal reflux disease)     Neuropathy        PSH:  Past Surgical History:   Procedure Laterality Date    COLONOSCOPY W/ POLYPECTOMY  01/28/2022    CYST  REMOVAL      Facial    SURGICAL REMOVAL OF LESION OF FACE Bilateral 07/18/2022    Procedure: EXCISION, LESION, FACE AND NECK;  Surgeon: Marcelo Capps MD;  Location: [facility];  Service: ENT;  Laterality: Bilateral;       FamHx:  Family History   Problem Relation Age of Onset    Diabetes Mother     Heart disease Mother     Cancer Father         Unsure of source    Aneurysm Sister     Diabetes Brother     Stomach cancer Brother     Alzheimer's disease Maternal Grandmother        SocHx:  Social History     Socioeconomic History    Marital status: Single    Number of children: 0   Tobacco Use    Smoking status: Some Days     Types: Cigars     Passive exposure: Current    Smokeless tobacco: Never    Tobacco comments:     1-2 times a week   Substance and Sexual Activity    Alcohol use: Yes     Alcohol/week: 12.0 standard drinks     Types: 12 Cans of beer per week     Comment: 4 beers 3 times per week    Drug use: Never     Social Determinants of Health     Financial Resource Strain: High Risk    Difficulty of Paying Living Expenses: Very hard   Food Insecurity: No Food Insecurity    Worried About Running Out of Food in the Last Year: Never true    Ran Out of Food in the Last Year: Never true   Transportation Needs: No Transportation Needs    Lack of Transportation (Medical): No    Lack of Transportation (Non-Medical): No   Physical Activity: Inactive    Days of Exercise per Week: 0 days    Minutes of Exercise per Session: 0 min   Stress: No Stress Concern Present    Feeling of Stress : Only a little   Social Connections: Socially Isolated    Frequency of Communication with Friends and Family: Three times a week    Frequency of Social Gatherings with Friends and Family: Never    Attends Muslim Services: Never    Active Member of Clubs or Organizations: No    Attends Club or Organization Meetings: Never    Marital Status: Never    Housing Stability: High Risk    Unable to Pay for Housing in the Last Year: Yes     Number of Places Lived in the Last Year: 1    Unstable Housing in the Last Year: No       Physical Exam:  Vitals:    05/30/23 0910   BP: 134/87   Pulse: 71   Resp: 20   Temp: 98.8 °F (37.1 °C)     General: A&Ox3, no apparent distress, no deformities  Neck: No masses, normal thyroid  Lungs: CTA kayleigh, no use of accessory muscles  Heart: RRR, no arrhythmias  Abdomen: Soft, NT, ND, no masses, no hernias, no hepatosplenomegaly  Lymphatic: Neck and groin nodes negative  Skin: The skin is warm and dry. No jaundice.  Ext: No c/c/e.          Imaging:  CT of the abdomen pelvis with without contrast revealed on 05/03/2023:  No evidence of nephrolithiasis, hydronephrosis, or hydroureter is seen.  There is a 12 mm exophytic hypodensity at the lower pole left kidney which demonstrates fluid attenuation without enhancement, compatible with a cyst..  The urinary bladder is underdistended which limits assessment.  The urinary bladder demonstrates wall thickening which could be related to underdistention, but underlying bladder pathology including an element of nonspecific cystitis cannot be excluded.  Please correlate with patient's clinical and laboratory findings in regards to further assessment and follow-up including cystoscopy as warranted. The prostate gland appears heterogeneous and enlarged, measuring 4.7 cm in transverse dimension. Hepatomegaly is seen.  The liver demonstrates diffuse decreased attenuation which may reflect fatty infiltration.      Impression:  Microscopic hematuria, BPH, possible interstitial cystitis, renal cysts left kidney.      Plan:  Patient scheduled for a cystoscope on 07/13/2023 with Dr. Arteaga.  Instructed patient if develops any urologic symptoms notify clinic to be re-evaluated in treated or go to emergency room.

## 2023-06-12 PROBLEM — Z00.00 WELLNESS EXAMINATION: Status: RESOLVED | Noted: 2022-08-01 | Resolved: 2023-06-12

## 2023-07-13 ENCOUNTER — PROCEDURE VISIT (OUTPATIENT)
Dept: UROLOGY | Facility: CLINIC | Age: 63
End: 2023-07-13
Payer: MEDICAID

## 2023-07-13 VITALS
HEART RATE: 67 BPM | HEIGHT: 70 IN | BODY MASS INDEX: 29.61 KG/M2 | DIASTOLIC BLOOD PRESSURE: 85 MMHG | OXYGEN SATURATION: 98 % | SYSTOLIC BLOOD PRESSURE: 134 MMHG | RESPIRATION RATE: 18 BRPM | WEIGHT: 206.81 LBS

## 2023-07-13 DIAGNOSIS — R31.29 MICROHEMATURIA: Primary | ICD-10-CM

## 2023-07-13 LAB
BILIRUB SERPL-MCNC: NORMAL MG/DL
BLOOD URINE, POC: NORMAL
COLOR, POC UA: NORMAL
GLUCOSE UR QL STRIP: NEGATIVE
KETONES UR QL STRIP: NORMAL
LEUKOCYTE ESTERASE URINE, POC: NEGATIVE
NITRITE, POC UA: NEGATIVE
PH, POC UA: 5.5
PROTEIN, POC: 100
SPECIFIC GRAVITY, POC UA: 1.03
UROBILINOGEN, POC UA: 1

## 2023-07-13 PROCEDURE — 52000 CYSTOURETHROSCOPY: CPT | Mod: S$PBB,,, | Performed by: UROLOGY

## 2023-07-13 PROCEDURE — 81001 URINALYSIS AUTO W/SCOPE: CPT | Mod: PBBFAC | Performed by: UROLOGY

## 2023-07-13 PROCEDURE — 52000 CYSTOURETHROSCOPY: CPT | Mod: PBBFAC | Performed by: UROLOGY

## 2023-07-13 PROCEDURE — 52000 PR CYSTOURETHROSCOPY: ICD-10-PCS | Mod: S$PBB,,, | Performed by: UROLOGY

## 2023-07-13 RX ORDER — LIDOCAINE HYDROCHLORIDE 20 MG/ML
JELLY TOPICAL
Status: COMPLETED | OUTPATIENT
Start: 2023-07-13 | End: 2023-07-13

## 2023-07-13 RX ORDER — CIPROFLOXACIN 500 MG/1
500 TABLET ORAL
Status: COMPLETED | OUTPATIENT
Start: 2023-07-13 | End: 2023-07-13

## 2023-07-13 RX ADMIN — LIDOCAINE HYDROCHLORIDE: 20 JELLY TOPICAL at 08:07

## 2023-07-13 RX ADMIN — CIPROFLOXACIN 500 MG: 500 TABLET ORAL at 08:07

## 2023-07-13 NOTE — PROGRESS NOTES
[facility] Neurology Follow Up Visit Note    Initial Visit Date: 1/10/2023  Last Visit Date: 4/12/2023  Current Visit Date:  07/14/2023    Chief Complaint:     Chief Complaint   Patient presents with    Peripheral sensory-motor axonal polyneuropathy     Pt reports worsening symptoms in LLE, and difficulty walking. He also c/o pain in lower right back/hip     History of Present Illness:      This is 62 y.o. male with history of chronic lower back pain, alcohol dependence who was referred for bilateral lower extremity numbness for 1 + year. Patient states that he had bilateral LBP radiating down bilateral LE, L>R. He reports decreased sensation in bilateral feet, left worse than right. Denied any trip and falls. Reports bilateral knee pain, left > right. Drinks alcohol throughout the week, drinking around a case of beer every 2 weeks. Quit smoking in 11/2019. Denied any saddle anesthesia. Denied any urinary incontinence. 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  Pt was last seen 4/12/2023. During that visit, CT L spine and polyneuropathy labs were ordered. Missed CT appointment.    Today, Pt states he continues w/ constant numbness/cold/shocking feeling to feet, worsened over last month as he ran out of [employer], did not notify office. Intermittent burning/tingling. Improved w/elevating legs. Sometimes worsens with activity. Denies falls, interferes w/ADLs. Drinks 1-2 beers weekly. Smokes a cigar twice a week. Not working at this time. New c/o R lower back pain that he has had for years, but more consistent for last week. Pain scale 9/10, described as aching. Interferes w/ADLs. Worse with prolonged sitting, improves w/standing.    Medications:     Current Outpatient Medications on File Prior to Visit   Medication Sig Dispense Refill    amLODIPine (NORVASC) 10 MG tablet Take 1 tablet (10 mg total) by mouth once daily. 30 tablet 5    famotidine (PEPCID) 40 MG tablet Take 1 tablet (40 mg total) by mouth nightly as  needed for Heartburn. 90 tablet 1    pantoprazole (PROTONIX) 40 MG tablet Take 1 tablet (40 mg total) by mouth once daily. 30 tablet 11    potassium chloride SA (K-DUR,KLOR-CON) 20 MEQ tablet Take 20 mEq by mouth 2 (two) times daily.      tamsulosin (FLOMAX) 0.4 mg Cap Take 1 capsule (0.4 mg total) by mouth once daily. 90 capsule 3    [DISCONTINUED] pregabalin (LYRICA) 50 MG capsule Take 50 mg by mouth 2 (two) times daily.       Current Facility-Administered Medications on File Prior to Visit   Medication Dose Route Frequency Provider Last Rate Last Admin    LIDOcaine (PF) 10 mg/ml (1%) injection 10 mg  1 mL Intradermal Once AN [place]           Labs:         Studies:      MRI L-spine without contrast: Pending.  CT L-spine without contrast: Pending    NCS/EMG bilateral LE 8/12/2022 by Dr. Marck London:   I have reviewed the study independently and with the patient. Incomplete study.  Also, no reference range.  No conduction velocity.  Latency and amplitude of SNAP and CMAP mostly symmetric without reference range.  No F-waves.     Review of Systems:     Review of Systems   All other systems reviewed and are negative.  As per HPI    Physical Exams:     Vitals:    07/14/23 1127   BP: 122/74   Pulse: 84     Physical Exam  Vitals and nursing note reviewed.   Constitutional:       Appearance: Normal appearance.   HENT:      Head: Normocephalic and atraumatic.      Nose: Nose normal.      Mouth/Throat:      Mouth: Mucous membranes are moist.      Pharynx: Oropharynx is clear.   Eyes:      Conjunctiva/sclera: Conjunctivae normal.   Cardiovascular:      Rate and Rhythm: Normal rate and regular rhythm.      Pulses: Normal pulses.      Heart sounds: Normal heart sounds.   Pulmonary:      Effort: Pulmonary effort is normal.      Breath sounds: Normal breath sounds.   Abdominal:      General: Abdomen is flat.      Palpations: Abdomen is soft.   Musculoskeletal:         General: Normal range of motion.       Cervical back: Normal range of motion.   Neurological:      Mental Status: He is alert.   Psychiatric:         Mood and Affect: Mood normal.     Comprehensive Neurological Exam:  Mental Status: Alert Oriented to Self, Date, and Place. Comprehension wnl. No dysarthria.   CN II - XII: DUARTE, No APD, VFFC, No ptosis OU, EOMI without nystagmus, LT/Temp symmetric in CN V1-3 distribution, Hearing grossly intact, Face Symmetric, Tongue and Uvula midline, Trapezius symmetric bilateral.   Motor: tone and bulk wnl throughout, no abnormal involuntary or voluntary movements, 5/5 to confrontation, Fine finger movements wnl b/l, No pronator drift.   Sensory: LT, Proprioception, Vibration, PP, Temp symmetric subjectively decreased in bilateral LE throughout.   Reflexes: 2+ throughout except for 1+ in bilateral AJ, plantar reflexes downward bilateral.   Cerebellar: FNF wnl b/l, RAHM wnl b/l.  Romberg: Negative  Gait: antalgic gait, stooped posture    Assessment:     This is 62 y.o. male with history of chronic lower back pain, alcohol dependence who was referred for bilateral lower extremity numbness. NCS/EMG results were inconclusive due to missing components based on report. Exam and history more consistent with lumbar radiculopathy. Missed CT L-spine appt, will provide Rx for Lyrica and re-order CT    Problem List Items Addressed This Visit          Neuro    Peripheral sensory-motor axonal polyneuropathy - Primary    Relevant Medications    pregabalin (LYRICA) 50 MG capsule    Other Relevant Orders    CT Lumbar Spine Without Contrast       Orthopedic    Chronic bilateral low back pain with bilateral sciatica     Other Visit Diagnoses       Dorsalgia, unspecified        Relevant Orders    CT Lumbar Spine Without Contrast          Plan:     [] re-order CT L-spine  [] may return to work with fall precautions  [] start Lyrica 50mg PO BID    RTC 3 Months     I have explained the treatment plan, diagnosis, and prognosis to patient. All  questions are answered to the best of my knowledge.     Face to face time 30 minutes, including documentation, chart review, counseling, education, review of test results, relevant medical records, and coordination of care.     I have explained the treatment plan, diagnosis, and prognosis to patient. All questions are answered to the best of my knowledge.     07/14/2023

## 2023-07-13 NOTE — PROGRESS NOTES
Pt seen by Dr. Son. Cysto performed in clinic. Consents signed and obtained by staff. Pt received medication per procedure protocol. Ciprofloxacin HCl tablet 500 mg & LIDOcaine HCl 2% urojet administered and tolerated well. RTC 6 months. Pt education given both written and verbal.

## 2023-07-14 ENCOUNTER — OFFICE VISIT (OUTPATIENT)
Dept: NEUROLOGY | Facility: CLINIC | Age: 63
End: 2023-07-14
Payer: MEDICAID

## 2023-07-14 VITALS
OXYGEN SATURATION: 99 % | BODY MASS INDEX: 29.78 KG/M2 | HEART RATE: 84 BPM | SYSTOLIC BLOOD PRESSURE: 122 MMHG | HEIGHT: 70 IN | WEIGHT: 208 LBS | DIASTOLIC BLOOD PRESSURE: 74 MMHG

## 2023-07-14 DIAGNOSIS — G89.29 CHRONIC BILATERAL LOW BACK PAIN WITH BILATERAL SCIATICA: ICD-10-CM

## 2023-07-14 DIAGNOSIS — M54.41 CHRONIC BILATERAL LOW BACK PAIN WITH BILATERAL SCIATICA: ICD-10-CM

## 2023-07-14 DIAGNOSIS — M54.9 DORSALGIA, UNSPECIFIED: ICD-10-CM

## 2023-07-14 DIAGNOSIS — G60.8 PERIPHERAL SENSORY-MOTOR AXONAL POLYNEUROPATHY: Primary | ICD-10-CM

## 2023-07-14 DIAGNOSIS — M54.42 CHRONIC BILATERAL LOW BACK PAIN WITH BILATERAL SCIATICA: ICD-10-CM

## 2023-07-14 PROCEDURE — 3008F BODY MASS INDEX DOCD: CPT | Mod: CPTII,,, | Performed by: NURSE PRACTITIONER

## 2023-07-14 PROCEDURE — 99214 PR OFFICE/OUTPT VISIT, EST, LEVL IV, 30-39 MIN: ICD-10-PCS | Mod: S$PBB,,, | Performed by: NURSE PRACTITIONER

## 2023-07-14 PROCEDURE — 99214 OFFICE O/P EST MOD 30 MIN: CPT | Mod: S$PBB,,, | Performed by: NURSE PRACTITIONER

## 2023-07-14 PROCEDURE — 3044F HG A1C LEVEL LT 7.0%: CPT | Mod: CPTII,,, | Performed by: NURSE PRACTITIONER

## 2023-07-14 PROCEDURE — 3008F PR BODY MASS INDEX (BMI) DOCUMENTED: ICD-10-PCS | Mod: CPTII,,, | Performed by: NURSE PRACTITIONER

## 2023-07-14 PROCEDURE — 1160F PR REVIEW ALL MEDS BY PRESCRIBER/CLIN PHARMACIST DOCUMENTED: ICD-10-PCS | Mod: CPTII,,, | Performed by: NURSE PRACTITIONER

## 2023-07-14 PROCEDURE — 3044F PR MOST RECENT HEMOGLOBIN A1C LEVEL <7.0%: ICD-10-PCS | Mod: CPTII,,, | Performed by: NURSE PRACTITIONER

## 2023-07-14 PROCEDURE — 3078F DIAST BP <80 MM HG: CPT | Mod: CPTII,,, | Performed by: NURSE PRACTITIONER

## 2023-07-14 PROCEDURE — 1160F RVW MEDS BY RX/DR IN RCRD: CPT | Mod: CPTII,,, | Performed by: NURSE PRACTITIONER

## 2023-07-14 PROCEDURE — 1159F MED LIST DOCD IN RCRD: CPT | Mod: CPTII,,, | Performed by: NURSE PRACTITIONER

## 2023-07-14 PROCEDURE — 3074F SYST BP LT 130 MM HG: CPT | Mod: CPTII,,, | Performed by: NURSE PRACTITIONER

## 2023-07-14 PROCEDURE — 99213 OFFICE O/P EST LOW 20 MIN: CPT | Mod: PBBFAC | Performed by: NURSE PRACTITIONER

## 2023-07-14 PROCEDURE — 3074F PR MOST RECENT SYSTOLIC BLOOD PRESSURE < 130 MM HG: ICD-10-PCS | Mod: CPTII,,, | Performed by: NURSE PRACTITIONER

## 2023-07-14 PROCEDURE — 3078F PR MOST RECENT DIASTOLIC BLOOD PRESSURE < 80 MM HG: ICD-10-PCS | Mod: CPTII,,, | Performed by: NURSE PRACTITIONER

## 2023-07-14 PROCEDURE — 1159F PR MEDICATION LIST DOCUMENTED IN MEDICAL RECORD: ICD-10-PCS | Mod: CPTII,,, | Performed by: NURSE PRACTITIONER

## 2023-07-14 RX ORDER — POTASSIUM CHLORIDE 20 MEQ/1
20 TABLET, EXTENDED RELEASE ORAL 2 TIMES DAILY
Status: ON HOLD | COMMUNITY
End: 2023-09-29 | Stop reason: SDUPTHER

## 2023-07-14 RX ORDER — PREGABALIN 50 MG/1
50 CAPSULE ORAL 2 TIMES DAILY
COMMUNITY
End: 2023-07-14 | Stop reason: SDUPTHER

## 2023-07-14 RX ORDER — PREGABALIN 50 MG/1
50 CAPSULE ORAL 2 TIMES DAILY
Qty: 60 CAPSULE | Refills: 3 | Status: SHIPPED | OUTPATIENT
Start: 2023-07-14 | End: 2024-02-08

## 2023-07-17 NOTE — PROCEDURES
CC:  Cystoscopy    HPI:  Arcadio Scott is a 62 y.o. male here for a cystoscopy for hematuria.  He was seen by Dennis Rodriguez NP for microscopic hematuria.  He does not have any urinary complaints.    Urinalysis:  Results for orders placed or performed in visit on 07/13/23   POCT URINE DIPSTICK WITH MICROSCOPE, AUTOMATED   Result Value Ref Range    Color, UA Orange     Spec Grav UA 1.030     pH, UA 5.5     WBC, UA Negative     Nitrite, UA Negative     Protein,      Glucose, UA Negative     Ketones, UA Trace     Urobilinogen, UA 1.0     Bilirubin, POC Small     Blood, UA Trace-intact      Microscopic:  0-2 RBC per HPF    Negative      Imaging:  CT - 7 March 2023:    1.  There is a cyst in the lower pole of the left kidney.    2.  Bladder wall thickening.      ROS:  All systems reviewed and are negative except as documented in HPI and/or Assessment and Plan.     Patient Active Problem List:     Patient Active Problem List   Diagnosis    Mass of soft tissue of neck    Epidermal cyst of face    Epidermal cyst of neck    Chronic bilateral low back pain with bilateral sciatica    Polyneuropathy    Peripheral sensory-motor axonal polyneuropathy    Personal history of colonic polyps    Gastroesophageal reflux disease    Nausea and vomiting    Alcohol use    Hypokalemia    Microscopic hematuria    Transaminitis    Hypertension        Past Medical History:  Past Medical History:   Diagnosis Date    GERD (gastroesophageal reflux disease)     Neuropathy         Past Surgical History:  Past Surgical History:   Procedure Laterality Date    COLONOSCOPY W/ POLYPECTOMY  01/28/2022    CYST REMOVAL      Facial    SURGICAL REMOVAL OF LESION OF FACE Bilateral 07/18/2022    Procedure: EXCISION, LESION, FACE AND NECK;  Surgeon: Marcelo Capps MD;  Location: [facility];  Service: ENT;  Laterality: Bilateral;        Family History:  Family History   Problem Relation Age of Onset    Diabetes Mother     Heart disease Mother     Cancer  Father         Unsure of source    Aneurysm Sister     Diabetes Brother     Stomach cancer Brother     Alzheimer's disease Maternal Grandmother         Social History:  Social History     Socioeconomic History    Marital status: Single    Number of children: 0   Tobacco Use    Smoking status: Some Days     Types: Cigars     Passive exposure: Current    Smokeless tobacco: Never    Tobacco comments:     1-2 times a week   Substance and Sexual Activity    Alcohol use: Yes     Alcohol/week: 12.0 standard drinks     Types: 12 Cans of beer per week     Comment: 4 beers 3 times per week but lessening    Drug use: Never     Social Determinants of Health     Financial Resource Strain: High Risk    Difficulty of Paying Living Expenses: Very hard   Food Insecurity: No Food Insecurity    Worried About Running Out of Food in the Last Year: Never true    Ran Out of Food in the Last Year: Never true   Transportation Needs: No Transportation Needs    Lack of Transportation (Medical): No    Lack of Transportation (Non-Medical): No   Physical Activity: Inactive    Days of Exercise per Week: 0 days    Minutes of Exercise per Session: 0 min   Stress: No Stress Concern Present    Feeling of Stress : Only a little   Social Connections: Socially Isolated    Frequency of Communication with Friends and Family: Three times a week    Frequency of Social Gatherings with Friends and Family: Never    Attends Mormonism Services: Never    Active Member of Clubs or Organizations: No    Attends Club or Organization Meetings: Never    Marital Status: Never    Housing Stability: High Risk    Unable to Pay for Housing in the Last Year: Yes    Number of Places Lived in the Last Year: 1    Unstable Housing in the Last Year: No        Allergies:  Review of patient's allergies indicates:  No Known Allergies     Objective:  Vitals:    07/13/23 0803   BP: 134/85   Pulse: 67   Resp: 18     General:  Well developed, well nourished adult male in no acute  distress  Abdomen: Soft, nontender, no masses  Extremities:  No clubbing, cyanosis, or edema  Neurologic:  Grossly intact  Musculoskeletal:  Normal tone    Cystoscopy:       - Penis:  Circumcised, no lesions        - Urethral meatus:  No strictures        - Urethra:  Normal without strictures or lesions           - Prostate:  Not enlarged        - Bladder neck:  Normal        - Bladder:  No mucosal abnormalities        - Ureteral orifices:  On the trigone with clear efflux bilaterally    The patient tolerated the procedure well without complications.  He was given Cipro 500mg, one tablet in the clinic.   The urethra was anesthetized with 2% Lidocaine Jelly, Urojet.      Assessment:  1. Microhematuria  - POCT URINE DIPSTICK WITH MICROSCOPE, AUTOMATED     Plan:  The workup for microscopic hematuria is negative with a negative CT scan and cystoscopy.    Follow-up:  Six months for cytology.

## 2023-08-04 ENCOUNTER — HOSPITAL ENCOUNTER (OUTPATIENT)
Dept: RADIOLOGY | Facility: HOSPITAL | Age: 63
Discharge: HOME | End: 2023-08-04
Attending: NURSE PRACTITIONER
Payer: MEDICAID

## 2023-08-04 DIAGNOSIS — M54.9 DORSALGIA, UNSPECIFIED: ICD-10-CM

## 2023-08-04 DIAGNOSIS — G60.8 PERIPHERAL SENSORY-MOTOR AXONAL POLYNEUROPATHY: ICD-10-CM

## 2023-08-04 PROCEDURE — 72131 CT LUMBAR SPINE W/O DYE: CPT | Mod: TC

## 2023-08-07 ENCOUNTER — TELEPHONE (OUTPATIENT)
Dept: NEUROLOGY | Facility: CLINIC | Age: 63
End: 2023-08-07
Payer: MEDICAID

## 2023-08-07 DIAGNOSIS — G89.29 CHRONIC BILATERAL LOW BACK PAIN WITH BILATERAL SCIATICA: ICD-10-CM

## 2023-08-07 DIAGNOSIS — M54.42 CHRONIC BILATERAL LOW BACK PAIN WITH BILATERAL SCIATICA: ICD-10-CM

## 2023-08-07 DIAGNOSIS — M48.062 SPINAL STENOSIS OF LUMBAR REGION WITH NEUROGENIC CLAUDICATION: Primary | ICD-10-CM

## 2023-08-07 DIAGNOSIS — M54.41 CHRONIC BILATERAL LOW BACK PAIN WITH BILATERAL SCIATICA: ICD-10-CM

## 2023-08-28 DIAGNOSIS — M54.9 DORSALGIA, UNSPECIFIED: Primary | ICD-10-CM

## 2023-09-13 ENCOUNTER — HOSPITAL ENCOUNTER (OUTPATIENT)
Dept: RADIOLOGY | Facility: HOSPITAL | Age: 63
Discharge: HOME | End: 2023-09-13
Attending: NURSE PRACTITIONER
Payer: MEDICAID

## 2023-09-13 DIAGNOSIS — M54.9 DORSALGIA, UNSPECIFIED: ICD-10-CM

## 2023-09-13 PROCEDURE — 72148 MRI LUMBAR SPINE W/O DYE: CPT | Mod: TC

## 2023-09-15 ENCOUNTER — TELEPHONE (OUTPATIENT)
Dept: NEUROLOGY | Facility: CLINIC | Age: 63
End: 2023-09-15
Payer: MEDICAID

## 2023-09-15 DIAGNOSIS — M48.062 SPINAL STENOSIS OF LUMBAR REGION WITH NEUROGENIC CLAUDICATION: Primary | ICD-10-CM

## 2023-09-15 NOTE — TELEPHONE ENCOUNTER
Attempted to call Pt to review MRI L-spine. Unable to leave . Will send referral to neurosurgery as previously discussed.

## 2023-09-25 ENCOUNTER — ANESTHESIA EVENT (OUTPATIENT)
Dept: ENDOSCOPY | Facility: HOSPITAL | Age: 63
End: 2023-09-25
Payer: MEDICAID

## 2023-09-25 RX ORDER — LIDOCAINE HYDROCHLORIDE 10 MG/ML
1 INJECTION, SOLUTION EPIDURAL; INFILTRATION; INTRACAUDAL; PERINEURAL ONCE
Status: CANCELLED | OUTPATIENT
Start: 2023-09-25 | End: 2023-09-25

## 2023-09-25 NOTE — ANESTHESIA PREPROCEDURE EVALUATION
"                                                                                                             09/25/2023  Arcadio Scott is a 62 y.o., male with PMHx of obesity, HTN, ETOH abuse presents for EGD/colonoscopy secondary to GERD, h/o colon polyps.    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    Personal history of colonic polyps 01/25/2023    Gastroesophageal reflux disease 01/25/2023    Nausea and vomiting 01/25/2023    Alcohol use 01/25/2023    Hypokalemia 03/09/2023    Microscopic hematuria 03/09/2023    Transaminitis 03/09/2023    Hypertension 03/09/2023     Resolved Ambulatory Problems     Diagnosis Date Noted    Facial soft tissue masses 05/10/2022    Wellness examination 08/01/2022    Elevated blood pressure reading 12/08/2022    Encounter for completion of form with patient 12/08/2022    Alcohol abuse 01/25/2023     Past Medical History:   Diagnosis Date    GERD (gastroesophageal reflux disease)     Neuropathy        Pre-op Assessment    I have reviewed the NPO Status.      Review of Systems  Anesthesia Hx:  No problems with previous Anesthesia    Social:  Non-Smoker    Cardiovascular:   Hypertension, well controlled    Pulmonary:  Pulmonary Normal    Renal/:  Renal/ Normal     Hepatic/GI:   Bowel Prep. GERD, well controlled    Neurological:  Neurology Normal    Endocrine:  Obesity / BMI > 30    Vitals:    10/30/23 0753   BP: 134/79   BP Location: Right arm   Pulse: 70   Resp: 18   Temp: 36.6 °C (97.9 °F)   SpO2: 100%   Weight: 94.3 kg (208 lb)   Height: 5' 9" (1.753 m)         Physical Exam  General: Alert, Cooperative and Well nourished    Airway:  Mallampati: II   Mouth Opening: " Normal  TM Distance: Normal  Tongue: Normal  Neck ROM: Normal ROM    Dental:  Periodontal disease    Chest/Lungs:  Clear to auscultation, Normal Respiratory Rate    Heart:  Rate: Normal  Rhythm: Regular Rhythm  Sounds: Normal      Lab Results   Component Value Date    WBC 6.65 10/09/2023    HGB 12.5 (L) 10/09/2023    HCT 37.0 (L) 10/09/2023    MCV 90.7 10/09/2023     10/09/2023         CMP  Sodium Level   Date Value Ref Range Status   10/09/2023 139 136 - 145 mmol/L Final     Potassium Level   Date Value Ref Range Status   10/09/2023 3.7 3.5 - 5.1 mmol/L Final     Carbon Dioxide   Date Value Ref Range Status   10/09/2023 22 (L) 23 - 31 mmol/L Final     Blood Urea Nitrogen   Date Value Ref Range Status   10/09/2023 7.1 (L) 8.4 - 25.7 mg/dL Final     Creatinine   Date Value Ref Range Status   10/09/2023 0.86 0.73 - 1.18 mg/dL Final     Calcium Level Total   Date Value Ref Range Status   10/09/2023 9.3 8.8 - 10.0 mg/dL Final     Albumin Level   Date Value Ref Range Status   10/09/2023 3.7 3.4 - 4.8 g/dL Final     Bilirubin Total   Date Value Ref Range Status   10/09/2023 0.3 <=1.5 mg/dL Final     Alkaline Phosphatase   Date Value Ref Range Status   10/09/2023 56 40 - 150 unit/L Final     Aspartate Aminotransferase   Date Value Ref Range Status   10/09/2023 24 5 - 34 unit/L Final     Alanine Aminotransferase   Date Value Ref Range Status   10/09/2023 23 0 - 55 unit/L Final     eGFR   Date Value Ref Range Status   10/09/2023 >60 mls/min/1.73/m2 Final             Anesthesia Plan  Type of Anesthesia, risks & benefits discussed:    Anesthesia Type: Gen Natural Airway  Intra-op Monitoring Plan: Standard ASA Monitors  Post Op Pain Control Plan: IV/PO Opioids PRN  Induction:  IV  Informed Consent: Informed consent signed with the Patient and all parties understand the risks and agree with anesthesia plan.  All questions answered.   ASA Score: 3  Day of Surgery Review of History & Physical: H&P Update referred to the  surgeon/provider.    Ready For Surgery From Anesthesia Perspective.     .

## 2023-09-27 ENCOUNTER — ANESTHESIA (OUTPATIENT)
Dept: ENDOSCOPY | Facility: HOSPITAL | Age: 63
End: 2023-09-27
Payer: MEDICAID

## 2023-09-27 ENCOUNTER — HOSPITAL ENCOUNTER (OUTPATIENT)
Facility: HOSPITAL | Age: 63
Discharge: HOME | End: 2023-09-29
Attending: INTERNAL MEDICINE | Admitting: INTERNAL MEDICINE
Payer: MEDICAID

## 2023-09-27 DIAGNOSIS — N17.9 AKI (ACUTE KIDNEY INJURY): Primary | ICD-10-CM

## 2023-09-27 DIAGNOSIS — K92.1 MELENA: ICD-10-CM

## 2023-09-27 DIAGNOSIS — R11.10 VOMITING: ICD-10-CM

## 2023-09-27 DIAGNOSIS — E87.6 HYPOKALEMIA: ICD-10-CM

## 2023-09-27 LAB
ALBUMIN SERPL-MCNC: 4.7 G/DL (ref 3.4–4.8)
ALBUMIN/GLOB SERPL: 1 RATIO (ref 1.1–2)
ALP SERPL-CCNC: 82 UNIT/L (ref 40–150)
ALT SERPL-CCNC: 26 UNIT/L (ref 0–55)
APPEARANCE UR: ABNORMAL
AST SERPL-CCNC: 60 UNIT/L (ref 5–34)
BACTERIA #/AREA URNS AUTO: ABNORMAL /HPF
BASOPHILS # BLD AUTO: 0.03 X10(3)/MCL
BASOPHILS NFR BLD AUTO: 0.3 %
BILIRUB SERPL-MCNC: 2 MG/DL
BILIRUB UR QL STRIP.AUTO: ABNORMAL
BUN SERPL-MCNC: 14.2 MG/DL (ref 8.4–25.7)
CALCIUM SERPL-MCNC: 10.5 MG/DL (ref 8.8–10)
CHLORIDE SERPL-SCNC: 92 MMOL/L (ref 98–107)
CO2 SERPL-SCNC: 17 MMOL/L (ref 23–31)
COLOR UR AUTO: ABNORMAL
CREAT SERPL-MCNC: 2.22 MG/DL (ref 0.73–1.18)
EOSINOPHIL # BLD AUTO: 0 X10(3)/MCL (ref 0–0.9)
EOSINOPHIL NFR BLD AUTO: 0 %
ERYTHROCYTE [DISTWIDTH] IN BLOOD BY AUTOMATED COUNT: 11.4 % (ref 11.5–17)
GFR SERPLBLD CREATININE-BSD FMLA CKD-EPI: 33 MLS/MIN/1.73/M2
GLOBULIN SER-MCNC: 4.8 GM/DL (ref 2.4–3.5)
GLUCOSE SERPL-MCNC: 144 MG/DL (ref 82–115)
GLUCOSE UR QL STRIP.AUTO: ABNORMAL
HCT VFR BLD AUTO: 42.4 % (ref 42–52)
HGB BLD-MCNC: 14.8 G/DL (ref 14–18)
HYALINE CASTS #/AREA URNS LPF: >20 /LPF
IMM GRANULOCYTES # BLD AUTO: 0.03 X10(3)/MCL (ref 0–0.04)
IMM GRANULOCYTES NFR BLD AUTO: 0.3 %
KETONES UR QL STRIP.AUTO: ABNORMAL
LEUKOCYTE ESTERASE UR QL STRIP.AUTO: 25
LIPASE SERPL-CCNC: 129 U/L
LYMPHOCYTES # BLD AUTO: 0.92 X10(3)/MCL (ref 0.6–4.6)
LYMPHOCYTES NFR BLD AUTO: 10.6 %
MAGNESIUM SERPL-MCNC: 2 MG/DL (ref 1.6–2.6)
MCH RBC QN AUTO: 30.5 PG (ref 27–31)
MCHC RBC AUTO-ENTMCNC: 34.9 G/DL (ref 33–36)
MCV RBC AUTO: 87.2 FL (ref 80–94)
MONOCYTES # BLD AUTO: 1.2 X10(3)/MCL (ref 0.1–1.3)
MONOCYTES NFR BLD AUTO: 13.9 %
MUCOUS THREADS URNS QL MICRO: ABNORMAL /LPF
NEUTROPHILS # BLD AUTO: 6.47 X10(3)/MCL (ref 2.1–9.2)
NEUTROPHILS NFR BLD AUTO: 74.9 %
NITRITE UR QL STRIP.AUTO: NEGATIVE
NRBC BLD AUTO-RTO: 0 %
PH UR STRIP.AUTO: 6 [PH]
PHOSPHATE SERPL-MCNC: 2.4 MG/DL (ref 2.3–4.7)
PLATELET # BLD AUTO: 146 X10(3)/MCL (ref 130–400)
PMV BLD AUTO: 11.5 FL (ref 7.4–10.4)
POTASSIUM SERPL-SCNC: 2.9 MMOL/L (ref 3.5–5.1)
POTASSIUM UR-SCNC: 55 MMOL/L
PROT SERPL-MCNC: 9.5 GM/DL (ref 5.8–7.6)
PROT UR QL STRIP.AUTO: ABNORMAL
RBC # BLD AUTO: 4.86 X10(6)/MCL (ref 4.7–6.1)
RBC #/AREA URNS AUTO: ABNORMAL /HPF
RBC UR QL AUTO: ABNORMAL
SODIUM SERPL-SCNC: 135 MMOL/L (ref 136–145)
SODIUM UR-SCNC: 23 MMOL/L
SP GR UR STRIP.AUTO: 1.03 (ref 1–1.03)
SQUAMOUS #/AREA URNS LPF: ABNORMAL /HPF
TROPONIN I SERPL-MCNC: 0.01 NG/ML (ref 0–0.04)
UROBILINOGEN UR STRIP-ACNC: ABNORMAL
WBC # SPEC AUTO: 8.65 X10(3)/MCL (ref 4.5–11.5)
WBC #/AREA URNS AUTO: ABNORMAL /HPF

## 2023-09-27 PROCEDURE — 96365 THER/PROPH/DIAG IV INF INIT: CPT

## 2023-09-27 PROCEDURE — 85025 COMPLETE CBC W/AUTO DIFF WBC: CPT | Performed by: PHYSICIAN ASSISTANT

## 2023-09-27 PROCEDURE — G0378 HOSPITAL OBSERVATION PER HR: HCPCS

## 2023-09-27 PROCEDURE — 96375 TX/PRO/DX INJ NEW DRUG ADDON: CPT

## 2023-09-27 PROCEDURE — 63600175 PHARM REV CODE 636 W HCPCS: Performed by: INTERNAL MEDICINE

## 2023-09-27 PROCEDURE — 25000003 PHARM REV CODE 250: Performed by: PHYSICIAN ASSISTANT

## 2023-09-27 PROCEDURE — 80053 COMPREHEN METABOLIC PANEL: CPT | Performed by: PHYSICIAN ASSISTANT

## 2023-09-27 PROCEDURE — 99285 EMERGENCY DEPT VISIT HI MDM: CPT | Mod: 25

## 2023-09-27 PROCEDURE — 83735 ASSAY OF MAGNESIUM: CPT | Performed by: INTERNAL MEDICINE

## 2023-09-27 PROCEDURE — 63600175 PHARM REV CODE 636 W HCPCS: Performed by: STUDENT IN AN ORGANIZED HEALTH CARE EDUCATION/TRAINING PROGRAM

## 2023-09-27 PROCEDURE — 84133 ASSAY OF URINE POTASSIUM: CPT | Performed by: STUDENT IN AN ORGANIZED HEALTH CARE EDUCATION/TRAINING PROGRAM

## 2023-09-27 PROCEDURE — 25000003 PHARM REV CODE 250: Performed by: STUDENT IN AN ORGANIZED HEALTH CARE EDUCATION/TRAINING PROGRAM

## 2023-09-27 PROCEDURE — 83690 ASSAY OF LIPASE: CPT | Performed by: PHYSICIAN ASSISTANT

## 2023-09-27 PROCEDURE — 84100 ASSAY OF PHOSPHORUS: CPT | Performed by: INTERNAL MEDICINE

## 2023-09-27 PROCEDURE — 81001 URINALYSIS AUTO W/SCOPE: CPT | Performed by: PHYSICIAN ASSISTANT

## 2023-09-27 PROCEDURE — 96366 THER/PROPH/DIAG IV INF ADDON: CPT

## 2023-09-27 PROCEDURE — 96376 TX/PRO/DX INJ SAME DRUG ADON: CPT

## 2023-09-27 PROCEDURE — 84300 ASSAY OF URINE SODIUM: CPT | Performed by: STUDENT IN AN ORGANIZED HEALTH CARE EDUCATION/TRAINING PROGRAM

## 2023-09-27 PROCEDURE — 84484 ASSAY OF TROPONIN QUANT: CPT | Performed by: PHYSICIAN ASSISTANT

## 2023-09-27 PROCEDURE — 93005 ELECTROCARDIOGRAM TRACING: CPT

## 2023-09-27 PROCEDURE — 63600175 PHARM REV CODE 636 W HCPCS: Performed by: PHYSICIAN ASSISTANT

## 2023-09-27 PROCEDURE — 83935 ASSAY OF URINE OSMOLALITY: CPT | Performed by: STUDENT IN AN ORGANIZED HEALTH CARE EDUCATION/TRAINING PROGRAM

## 2023-09-27 RX ORDER — PANTOPRAZOLE SODIUM 40 MG/1
40 TABLET, DELAYED RELEASE ORAL DAILY
Status: DISCONTINUED | OUTPATIENT
Start: 2023-09-27 | End: 2023-09-29 | Stop reason: HOSPADM

## 2023-09-27 RX ORDER — POTASSIUM CHLORIDE 7.45 MG/ML
10 INJECTION INTRAVENOUS ONCE
Status: COMPLETED | OUTPATIENT
Start: 2023-09-27 | End: 2023-09-27

## 2023-09-27 RX ORDER — POTASSIUM CHLORIDE 7.45 MG/ML
10 INJECTION INTRAVENOUS
Status: COMPLETED | OUTPATIENT
Start: 2023-09-27 | End: 2023-09-27

## 2023-09-27 RX ORDER — ONDANSETRON 2 MG/ML
4 INJECTION INTRAMUSCULAR; INTRAVENOUS EVERY 8 HOURS PRN
Status: DISCONTINUED | OUTPATIENT
Start: 2023-09-27 | End: 2023-09-29 | Stop reason: HOSPADM

## 2023-09-27 RX ORDER — AMLODIPINE BESYLATE 10 MG/1
10 TABLET ORAL DAILY
Status: DISCONTINUED | OUTPATIENT
Start: 2023-09-28 | End: 2023-09-29 | Stop reason: HOSPADM

## 2023-09-27 RX ORDER — TAMSULOSIN HYDROCHLORIDE 0.4 MG/1
0.4 CAPSULE ORAL DAILY
Status: DISCONTINUED | OUTPATIENT
Start: 2023-09-28 | End: 2023-09-29 | Stop reason: HOSPADM

## 2023-09-27 RX ORDER — SODIUM CHLORIDE, SODIUM LACTATE, POTASSIUM CHLORIDE, CALCIUM CHLORIDE 600; 310; 30; 20 MG/100ML; MG/100ML; MG/100ML; MG/100ML
INJECTION, SOLUTION INTRAVENOUS CONTINUOUS
Status: DISCONTINUED | OUTPATIENT
Start: 2023-09-27 | End: 2023-09-27

## 2023-09-27 RX ORDER — ONDANSETRON 2 MG/ML
4 INJECTION INTRAMUSCULAR; INTRAVENOUS
Status: COMPLETED | OUTPATIENT
Start: 2023-09-27 | End: 2023-09-27

## 2023-09-27 RX ORDER — SODIUM CHLORIDE 0.9 % (FLUSH) 0.9 %
10 SYRINGE (ML) INJECTION
Status: DISCONTINUED | OUTPATIENT
Start: 2023-09-27 | End: 2023-09-29 | Stop reason: HOSPADM

## 2023-09-27 RX ORDER — TRAMADOL HYDROCHLORIDE 50 MG/1
50 TABLET ORAL EVERY 6 HOURS PRN
Status: DISCONTINUED | OUTPATIENT
Start: 2023-09-27 | End: 2023-09-29 | Stop reason: HOSPADM

## 2023-09-27 RX ORDER — POTASSIUM CHLORIDE 7.45 MG/ML
10 INJECTION INTRAVENOUS
Status: DISCONTINUED | OUTPATIENT
Start: 2023-09-27 | End: 2023-09-27

## 2023-09-27 RX ORDER — SODIUM CHLORIDE AND POTASSIUM CHLORIDE 150; 900 MG/100ML; MG/100ML
INJECTION, SOLUTION INTRAVENOUS
Status: COMPLETED | OUTPATIENT
Start: 2023-09-27 | End: 2023-09-27

## 2023-09-27 RX ORDER — POTASSIUM CHLORIDE 20 MEQ/1
40 TABLET, EXTENDED RELEASE ORAL ONCE
Status: COMPLETED | OUTPATIENT
Start: 2023-09-27 | End: 2023-09-27

## 2023-09-27 RX ORDER — ACETAMINOPHEN 325 MG/1
650 TABLET ORAL EVERY 8 HOURS PRN
Status: DISCONTINUED | OUTPATIENT
Start: 2023-09-27 | End: 2023-09-29 | Stop reason: HOSPADM

## 2023-09-27 RX ORDER — SODIUM CHLORIDE 9 MG/ML
1000 INJECTION, SOLUTION INTRAVENOUS
Status: COMPLETED | OUTPATIENT
Start: 2023-09-27 | End: 2023-09-27

## 2023-09-27 RX ORDER — TALC
6 POWDER (GRAM) TOPICAL NIGHTLY PRN
Status: DISCONTINUED | OUTPATIENT
Start: 2023-09-27 | End: 2023-09-29 | Stop reason: HOSPADM

## 2023-09-27 RX ORDER — HEPARIN SODIUM 5000 [USP'U]/ML
5000 INJECTION, SOLUTION INTRAVENOUS; SUBCUTANEOUS EVERY 12 HOURS
Status: DISCONTINUED | OUTPATIENT
Start: 2023-09-27 | End: 2023-09-29 | Stop reason: HOSPADM

## 2023-09-27 RX ADMIN — ONDANSETRON 4 MG: 2 INJECTION INTRAMUSCULAR; INTRAVENOUS at 12:09

## 2023-09-27 RX ADMIN — POTASSIUM CHLORIDE 10 MEQ: 7.46 INJECTION, SOLUTION INTRAVENOUS at 01:09

## 2023-09-27 RX ADMIN — POTASSIUM CHLORIDE 10 MEQ: 7.46 INJECTION, SOLUTION INTRAVENOUS at 06:09

## 2023-09-27 RX ADMIN — POTASSIUM CHLORIDE 40 MEQ: 1500 TABLET, EXTENDED RELEASE ORAL at 01:09

## 2023-09-27 RX ADMIN — POTASSIUM CHLORIDE AND SODIUM CHLORIDE: 900; 150 INJECTION, SOLUTION INTRAVENOUS at 01:09

## 2023-09-27 RX ADMIN — POTASSIUM CHLORIDE 10 MEQ: 7.46 INJECTION, SOLUTION INTRAVENOUS at 05:09

## 2023-09-27 RX ADMIN — SODIUM CHLORIDE, POTASSIUM CHLORIDE, SODIUM LACTATE AND CALCIUM CHLORIDE 1000 ML: 600; 310; 30; 20 INJECTION, SOLUTION INTRAVENOUS at 03:09

## 2023-09-27 RX ADMIN — POTASSIUM CHLORIDE 10 MEQ: 7.46 INJECTION, SOLUTION INTRAVENOUS at 03:09

## 2023-09-27 RX ADMIN — SODIUM CHLORIDE 1000 ML: 9 INJECTION, SOLUTION INTRAVENOUS at 01:09

## 2023-09-27 RX ADMIN — PANTOPRAZOLE SODIUM 40 MG: 40 TABLET, DELAYED RELEASE ORAL at 03:09

## 2023-09-27 NOTE — ED PROVIDER NOTES
Encounter Date: 9/27/2023       History     Chief Complaint   Patient presents with    Rectal Bleeding    Vomiting     C/o vomiting, blood in stool since yesterdayu     Arcadio Scott is a 62 y.o. male with a PMHx of HTN and GERD presents to the ED with complaints of vomiting and loose bowel movements that started 1 day(s) ago. He reports he was having the same symptoms last week but states they stopped after 2 days. Was scheduled for a colonscopy today and reports that everytime he tried to drink the prep, he would vomit and have diarrhea. Reports stools have been dark red, which is what prompted the colonoscopy. He states they called from GI clinic today to ask where he was and he told them ED and they will reschedule his colonscopy. He denies BRBPR, rectal pain, chest pain, shortness of breath. He states he ran out of his GERD medication 2 days ago.       The history is provided by the patient. No  was used.     Review of patient's allergies indicates:  No Known Allergies  Past Medical History:   Diagnosis Date    GERD (gastroesophageal reflux disease)     Neuropathy      Past Surgical History:   Procedure Laterality Date    COLONOSCOPY W/ POLYPECTOMY  01/28/2022    CYST REMOVAL      Facial    SURGICAL REMOVAL OF LESION OF FACE Bilateral 07/18/2022    Procedure: EXCISION, LESION, FACE AND NECK;  Surgeon: Marcelo Capps MD;  Location: [facility];  Service: ENT;  Laterality: Bilateral;     Family History   Problem Relation Age of Onset    Diabetes Mother     Heart disease Mother     Cancer Father         Unsure of source    Aneurysm Sister     Diabetes Brother     Stomach cancer Brother     Alzheimer's disease Maternal Grandmother      Social History     Tobacco Use    Smoking status: Some Days     Types: Cigars     Passive exposure: Current    Smokeless tobacco: Never    Tobacco comments:     1-2 times a week   Substance Use Topics    Alcohol use: Yes     Alcohol/week: 12.0 standard drinks of  alcohol     Types: 12 Cans of beer per week     Comment: 4 beers 3 times per week but lessening    Drug use: Never     Review of Systems   Constitutional:  Negative for chills, fatigue and fever.   HENT:  Negative for congestion, ear pain, sinus pain and sore throat.    Eyes:  Negative for pain.   Respiratory:  Negative for cough, chest tightness and shortness of breath.    Cardiovascular:  Negative for chest pain.   Gastrointestinal:  Positive for abdominal pain, blood in stool, diarrhea and vomiting. Negative for constipation and nausea.   Genitourinary:  Negative for dysuria, frequency and hematuria.   Musculoskeletal:  Negative for back pain and joint swelling.   Skin:  Negative for color change and rash.   Neurological:  Negative for dizziness and weakness.   Psychiatric/Behavioral:  Negative for behavioral problems and confusion.        Physical Exam     Initial Vitals [09/27/23 1133]   BP Pulse Resp Temp SpO2   (!) 134/91 98 20 97.5 °F (36.4 °C) 100 %      MAP       --         Physical Exam    Nursing note and vitals reviewed.  Constitutional: He appears well-developed and well-nourished.   HENT:   Head: Normocephalic and atraumatic.   Nose: Nose normal.   Mouth/Throat: Oropharynx is clear and moist.   Eyes: Conjunctivae and EOM are normal. Pupils are equal, round, and reactive to light.   Neck: Neck supple. No JVD present.   Normal range of motion.  Cardiovascular:  Normal rate, regular rhythm, normal heart sounds and intact distal pulses.           Pulmonary/Chest: Breath sounds normal. No respiratory distress. He has no wheezes. He has no rhonchi. He has no rales.   Abdominal: Abdomen is soft. Bowel sounds are normal. He exhibits no distension. There is no abdominal tenderness. There is no rebound and no guarding.   Musculoskeletal:         General: No tenderness. Normal range of motion.      Cervical back: Normal range of motion and neck supple.     Lymphadenopathy:     He has no cervical adenopathy.    Neurological: He is alert and oriented to person, place, and time.   Skin: Skin is warm. Capillary refill takes less than 2 seconds.   Psychiatric: He has a normal mood and affect. Thought content normal.         ED Course   Critical Care    Date/Time: 9/27/2023 1:14 PM    Performed by: Geneva Hicks PA-C  Authorized by: Keven Sifuentes MD  Direct patient critical care time: 45 minutes  Total critical care time (exclusive of procedural time) : 45 minutes  Critical care was necessary to treat or prevent imminent or life-threatening deterioration of the following conditions: renal failure and dehydration.  Critical care was time spent personally by me on the following activities: blood draw for specimens, development of treatment plan with patient or surrogate, discussions with consultants, interpretation of cardiac output measurements, evaluation of patient's response to treatment, examination of patient, pulse oximetry, obtaining history from patient or surrogate, ordering and performing treatments and interventions and review of old charts.        Labs Reviewed   COMPREHENSIVE METABOLIC PANEL - Abnormal; Notable for the following components:       Result Value    Sodium Level 135 (*)     Potassium Level 2.9 (*)     Chloride 92 (*)     Carbon Dioxide 17 (*)     Glucose Level 144 (*)     Creatinine 2.22 (*)     Calcium Level Total 10.5 (*)     Protein Total 9.5 (*)     Globulin 4.8 (*)     Albumin/Globulin Ratio 1.0 (*)     Bilirubin Total 2.0 (*)     Aspartate Aminotransferase 60 (*)     All other components within normal limits   LIPASE - Abnormal; Notable for the following components:    Lipase Level 129 (*)     All other components within normal limits   CBC WITH DIFFERENTIAL - Abnormal; Notable for the following components:    RDW 11.4 (*)     MPV 11.5 (*)     All other components within normal limits   TROPONIN I - Normal   CBC W/ AUTO DIFFERENTIAL    Narrative:     The following  orders were created for panel order CBC auto differential.  Procedure                               Abnormality         Status                     ---------                               -----------         ------                     CBC with Differential[[id number]]       Abnormal            Final result                 Please view results for these tests on the individual orders.   URINALYSIS, REFLEX TO URINE CULTURE   EXTRA TUBES    Narrative:     The following orders were created for panel order EXTRA TUBES.  Procedure                               Abnormality         Status                     ---------                               -----------         ------                     Light Blue Top Hold[[id number]]                             In process                 Gold Top Hold[[id number]]                                   In process                   Please view results for these tests on the individual orders.   LIGHT BLUE TOP HOLD   GOLD TOP HOLD     EKG Readings: (Independently Interpreted)   Initial Reading: No STEMI. Rhythm: Normal Sinus Rhythm. Heart Rate: 99. Other Findings: Prolonged QT Interval.   T wave abnormality, consider anterolateral ischemia     ECG Results              EKG 12-lead (In process)  Result time 09/27/23 12:18:07      In process by Interface, Lab In [facility] (09/27/23 12:18:07)                   Narrative:    Test Reason : R11.10,    Vent. Rate : 099 BPM     Atrial Rate : 099 BPM     P-R Int : 170 ms          QRS Dur : 094 ms      QT Int : 428 ms       P-R-T Axes : 016 -13 050 degrees     QTc Int : 549 ms    Normal sinus rhythm  Voltage criteria for left ventricular hypertrophy  T wave abnormality, consider anterolateral ischemia  Prolonged QT  Abnormal ECG  No previous ECGs available    Referred By:             Confirmed By:                                   Imaging Results              X-Ray Chest 1 View (Final result)  Result time 09/27/23 12:24:24      Final result by Reyes  Parth MCCARTHY MD (09/27/23 12:24:24)                   Impression:      NO ACUTE CARDIOPULMONARY PROCESS IDENTIFIED.      Electronically signed by: Parth Chambers  Date:    09/27/2023  Time:    12:24               Narrative:    EXAMINATION:  XR CHEST 1 VIEW    CLINICAL HISTORY:  vomiting;    TECHNIQUE:  One view    COMPARISON:  October 13, 2021.    FINDINGS:  Cardiopericardial silhouette is within normal limits.  No acute dense focal or segmental consolidation, congestive process, pleural effusions or pneumothorax.                                       Medications   ondansetron injection 4 mg (4 mg Intravenous Given 9/27/23 1200)   potassium chloride 10 mEq in 100 mL IVPB (10 mEq Intravenous New Bag 9/27/23 1305)   potassium chloride SA CR tablet 40 mEq (40 mEq Oral Given 9/27/23 1303)   0.9%  NaCl infusion (1,000 mLs Intravenous New Bag 9/27/23 1300)     Medical Decision Making  63 y/o male presents to the ED with complaints of vomiting, nausea, diarrhea, and dark red stools. Had colonoscopy scheduled for today. Took prep yesterday.    DDX: colonoscopy prep side effects, dehydration, gastroenteritis, electrolyte disturbance, among others    Lab work, chest xray, ekg, IV, fluids and antiemetics ordered upon arrival to the ED. EKG with T wave abnormality which is changed from prior ekg. Cardiac workup within normal limits. Continues to deny chest pain. CMP with ETTA, creatinine 2.2, up from 0.7-0.9. Slightly hypokalemic. Will replete in the ED. Will call IM for admission for ETTA. Discussed this case with Dr. Wade who agrees with plan.     Amount and/or Complexity of Data Reviewed  Labs: ordered.  Radiology: ordered.    Risk  Prescription drug management.                               Clinical Impression:   Final diagnoses:  [R11.10] Vomiting  [N17.9] ETTA (acute kidney injury) (Primary)  [E87.6] Hypokalemia  [K92.1] Melena        ED Disposition Condition    Admit Stable                Geneva Hicks,  CHIVO  09/27/23 0260

## 2023-09-27 NOTE — H&P
[facility] INTERNAL MEDICINE  ADMISSION HISTORY AND PHYSICAL    Resident Team: [facility] Medicine List 3  Attending Physician: Steph Parker MD  Date of Admit: 9/27/2023    SUBJECTIVE:      HPI: Arcadio Scott is a 62 y.o. male with PMH of HTN, GERD, BPH, facial cysts s/p excision (2022), and sciatica - left leg, presented to the ED on 9/27/2023 with a CC of nausea and vomiting that started on Monday morning with an average 7-8 episodes of vomiting per day until arrival at the ED. His vomit was mainly the food he ate or liquid he drank. No recent changes in diet or sick contact nor addition of new meds reported. He has had similar symptoms a few months that resolved spontaneously. His symptoms were aggravated after he started bowel prep on Tuesday in preparation for his colonoscopy on Wednesday (9/27/2023). Also reported having minimal blood streaked stools yesterday evening, no active bleeding reported. Other associated symptoms include intermittent nausea and mild mid abdominal discomfort. In the ED: Vomiting stopped; sinus tachycardia on telemetry, BP stable; labs were significant for mild hyponatremia, K+ 2.9, Cl-92, metabolic acidosis, creatinine 2.22, calcium 10.5 . Hospital medicine team was consulted for the management of ETTA.    ROS:   (+) Nausea, vomiting, mid abdominal discomfort  (-) Chest pain, palpitations, shortness of breath, fever, night sweat, chills, diarrhea, constipation, hematuria    PMH: As stated above  Family HX: Father (lung cancer), mother (CHF)  Allergy: NKDA  Social HX: Denies tobacco use; social drinker (1-2 beers/week); denies illicit drug use  Home meds:   Current Outpatient Medications   Medication Instructions    amLODIPine (NORVASC) 10 mg, Oral, Daily    famotidine (PEPCID) 40 mg, Oral, Nightly PRN    pantoprazole (PROTONIX) 40 mg, Oral, Daily    potassium chloride SA (K-DUR,KLOR-CON) 20 MEQ tablet 20 mEq, Oral, 2 times daily    pregabalin (LYRICA) 50 mg, Oral, 2 times daily    tamsulosin  "(FLOMAX) 0.4 mg, Oral, Daily         OBJECTIVE:     Vital signs:   /80   Pulse 106   Temp 97.5 °F (36.4 °C) (Temporal)   Resp 20   Ht 5' 10" (1.778 m)   Wt 92 kg (202 lb 13.2 oz)   SpO2 99%   BMI 29.10 kg/m²      Physical Examination:  General: Overweight w/ mild distress  HEENT: NC/AT; PERRL; oral mucosa dry with thick saliva  Pulm: CTA bilaterally, normal work of breathing on room air  CV: S1, S2 w/o murmurs or gallops; no edema noted  GI: Soft with normal bowel sounds in all quadrants, no masses on palpation  MSK: Full ROM of all extremities   Neuro: AAOx3; motor/sensory function intact  Psych: Cooperative; appropriate mood and affect    Laboratory:  Labs on admission reviewed    Imagin2023 - CXR revealed no acute cardiopulmonary abnormalities    ASSESSMENT & PLAN:     ETTA 2/2 dehydration  Nausea & vomiting  Electrolyte abnormalities (hypokalemia, hypercalcemia)  -Received 2 L NS boluses, KCL 40 mEq PO x1, KCL 30 mEq IV  -Will give another 1 L of LR over 4 hours  -Initiated clear liquid diet, advance as tolerated   -Will consider obtain renal ultrasound if creatinine does not improve with IV fluid resuscitation  -Pending labs: urine electrolytes     Prolonged QTc  -EKG on admission revealed Qtc of 549; will repeat EKG next AM    HTN  -Continue home Amlodipine 10mg daily    BPH  -Continue home Flomax 0.4mg daily     GERD  -Continue home Protonix 40mg daily    DVT PPx: Heparin   GI PPx: Protonix  Diet: Clear liquid, advanced as tolerated  ABX: None  Fluids: None  O2: Room air  PCP: Haseeb Dunham FNP    Disposition (2023): Admitted to inpatient service for ongoing monitoring and medical therapy. Patient can be discharged home when medically stable with outpatient GI F/U for colonoscopy as there is no indication for urgent endoscopic procedure at this time.    Ottoniel Phillips, DO   [facility], PGY-II   "

## 2023-09-28 LAB
ALBUMIN SERPL-MCNC: 3.5 G/DL (ref 3.4–4.8)
ALBUMIN/GLOB SERPL: 1 RATIO (ref 1.1–2)
ALP SERPL-CCNC: 59 UNIT/L (ref 40–150)
ALT SERPL-CCNC: 19 UNIT/L (ref 0–55)
ANION GAP SERPL CALC-SCNC: 9 MEQ/L
AST SERPL-CCNC: 47 UNIT/L (ref 5–34)
BASOPHILS # BLD AUTO: 0.04 X10(3)/MCL
BASOPHILS NFR BLD AUTO: 0.6 %
BILIRUB SERPL-MCNC: 1.9 MG/DL
BUN SERPL-MCNC: 10.5 MG/DL (ref 8.4–25.7)
BUN SERPL-MCNC: 7.4 MG/DL (ref 8.4–25.7)
CALCIUM SERPL-MCNC: 9.2 MG/DL (ref 8.8–10)
CALCIUM SERPL-MCNC: 9.3 MG/DL (ref 8.8–10)
CHLORIDE SERPL-SCNC: 97 MMOL/L (ref 98–107)
CHLORIDE SERPL-SCNC: 97 MMOL/L (ref 98–107)
CO2 SERPL-SCNC: 22 MMOL/L (ref 23–31)
CO2 SERPL-SCNC: 25 MMOL/L (ref 23–31)
CREAT SERPL-MCNC: 0.8 MG/DL (ref 0.73–1.18)
CREAT SERPL-MCNC: 1.01 MG/DL (ref 0.73–1.18)
CREAT/UREA NIT SERPL: 9
EOSINOPHIL # BLD AUTO: 0.13 X10(3)/MCL (ref 0–0.9)
EOSINOPHIL NFR BLD AUTO: 2 %
ERYTHROCYTE [DISTWIDTH] IN BLOOD BY AUTOMATED COUNT: 11.1 % (ref 11.5–17)
GFR SERPLBLD CREATININE-BSD FMLA CKD-EPI: >60 MLS/MIN/1.73/M2
GFR SERPLBLD CREATININE-BSD FMLA CKD-EPI: >60 MLS/MIN/1.73/M2
GLOBULIN SER-MCNC: 3.5 GM/DL (ref 2.4–3.5)
GLUCOSE SERPL-MCNC: 124 MG/DL (ref 82–115)
GLUCOSE SERPL-MCNC: 161 MG/DL (ref 82–115)
HCT VFR BLD AUTO: 35.3 % (ref 42–52)
HGB BLD-MCNC: 12.8 G/DL (ref 14–18)
IMM GRANULOCYTES # BLD AUTO: 0.02 X10(3)/MCL (ref 0–0.04)
IMM GRANULOCYTES NFR BLD AUTO: 0.3 %
LYMPHOCYTES # BLD AUTO: 1.63 X10(3)/MCL (ref 0.6–4.6)
LYMPHOCYTES NFR BLD AUTO: 25.5 %
MAGNESIUM SERPL-MCNC: 1.6 MG/DL (ref 1.6–2.6)
MCH RBC QN AUTO: 31.6 PG (ref 27–31)
MCHC RBC AUTO-ENTMCNC: 36.3 G/DL (ref 33–36)
MCV RBC AUTO: 87.2 FL (ref 80–94)
MONOCYTES # BLD AUTO: 0.85 X10(3)/MCL (ref 0.1–1.3)
MONOCYTES NFR BLD AUTO: 13.3 %
NEUTROPHILS # BLD AUTO: 3.71 X10(3)/MCL (ref 2.1–9.2)
NEUTROPHILS NFR BLD AUTO: 58.3 %
NRBC BLD AUTO-RTO: 0 %
OSMOLALITY UR: 762 MOSM/KG (ref 300–1300)
PHOSPHATE SERPL-MCNC: 1.6 MG/DL (ref 2.3–4.7)
PLATELET # BLD AUTO: 121 X10(3)/MCL (ref 130–400)
PLATELETS.RETICULATED NFR BLD AUTO: 14 % (ref 0.9–11.2)
PMV BLD AUTO: 12.2 FL (ref 7.4–10.4)
POTASSIUM SERPL-SCNC: 2.9 MMOL/L (ref 3.5–5.1)
POTASSIUM SERPL-SCNC: 3.3 MMOL/L (ref 3.5–5.1)
PROT SERPL-MCNC: 7 GM/DL (ref 5.8–7.6)
RBC # BLD AUTO: 4.05 X10(6)/MCL (ref 4.7–6.1)
SODIUM SERPL-SCNC: 131 MMOL/L (ref 136–145)
SODIUM SERPL-SCNC: 131 MMOL/L (ref 136–145)
WBC # SPEC AUTO: 6.38 X10(3)/MCL (ref 4.5–11.5)

## 2023-09-28 PROCEDURE — 96366 THER/PROPH/DIAG IV INF ADDON: CPT

## 2023-09-28 PROCEDURE — 96367 TX/PROPH/DG ADDL SEQ IV INF: CPT

## 2023-09-28 PROCEDURE — 93005 ELECTROCARDIOGRAM TRACING: CPT

## 2023-09-28 PROCEDURE — 25000003 PHARM REV CODE 250

## 2023-09-28 PROCEDURE — 63600175 PHARM REV CODE 636 W HCPCS

## 2023-09-28 PROCEDURE — 85025 COMPLETE CBC W/AUTO DIFF WBC: CPT | Performed by: STUDENT IN AN ORGANIZED HEALTH CARE EDUCATION/TRAINING PROGRAM

## 2023-09-28 PROCEDURE — 25000003 PHARM REV CODE 250: Performed by: STUDENT IN AN ORGANIZED HEALTH CARE EDUCATION/TRAINING PROGRAM

## 2023-09-28 PROCEDURE — 94761 N-INVAS EAR/PLS OXIMETRY MLT: CPT

## 2023-09-28 PROCEDURE — G0378 HOSPITAL OBSERVATION PER HR: HCPCS

## 2023-09-28 PROCEDURE — 83735 ASSAY OF MAGNESIUM: CPT | Performed by: STUDENT IN AN ORGANIZED HEALTH CARE EDUCATION/TRAINING PROGRAM

## 2023-09-28 PROCEDURE — 96368 THER/DIAG CONCURRENT INF: CPT

## 2023-09-28 PROCEDURE — 80053 COMPREHEN METABOLIC PANEL: CPT | Performed by: STUDENT IN AN ORGANIZED HEALTH CARE EDUCATION/TRAINING PROGRAM

## 2023-09-28 PROCEDURE — 84100 ASSAY OF PHOSPHORUS: CPT | Performed by: STUDENT IN AN ORGANIZED HEALTH CARE EDUCATION/TRAINING PROGRAM

## 2023-09-28 RX ORDER — POTASSIUM CHLORIDE 7.45 MG/ML
10 INJECTION INTRAVENOUS
Status: COMPLETED | OUTPATIENT
Start: 2023-09-28 | End: 2023-09-28

## 2023-09-28 RX ORDER — MAGNESIUM SULFATE HEPTAHYDRATE 40 MG/ML
4 INJECTION, SOLUTION INTRAVENOUS ONCE
Status: COMPLETED | OUTPATIENT
Start: 2023-09-28 | End: 2023-09-28

## 2023-09-28 RX ORDER — POTASSIUM CHLORIDE 20 MEQ/1
40 TABLET, EXTENDED RELEASE ORAL ONCE
Status: COMPLETED | OUTPATIENT
Start: 2023-09-28 | End: 2023-09-28

## 2023-09-28 RX ADMIN — POTASSIUM CHLORIDE 40 MEQ: 1500 TABLET, EXTENDED RELEASE ORAL at 09:09

## 2023-09-28 RX ADMIN — PANTOPRAZOLE SODIUM 40 MG: 40 TABLET, DELAYED RELEASE ORAL at 09:09

## 2023-09-28 RX ADMIN — TAMSULOSIN HYDROCHLORIDE 0.4 MG: 0.4 CAPSULE ORAL at 09:09

## 2023-09-28 RX ADMIN — POTASSIUM CHLORIDE 10 MEQ: 7.46 INJECTION, SOLUTION INTRAVENOUS at 09:09

## 2023-09-28 RX ADMIN — POTASSIUM PHOSPHATE, MONOBASIC AND POTASSIUM PHOSPHATE, DIBASIC 30 MMOL: 224; 236 INJECTION, SOLUTION, CONCENTRATE INTRAVENOUS at 03:09

## 2023-09-28 RX ADMIN — POTASSIUM CHLORIDE 10 MEQ: 7.46 INJECTION, SOLUTION INTRAVENOUS at 11:09

## 2023-09-28 RX ADMIN — MAGNESIUM SULFATE HEPTAHYDRATE 4 G: 40 INJECTION, SOLUTION INTRAVENOUS at 07:09

## 2023-09-28 RX ADMIN — AMLODIPINE BESYLATE 10 MG: 10 TABLET ORAL at 09:09

## 2023-09-28 RX ADMIN — POTASSIUM CHLORIDE 10 MEQ: 7.46 INJECTION, SOLUTION INTRAVENOUS at 10:09

## 2023-09-28 RX ADMIN — POTASSIUM CHLORIDE 10 MEQ: 7.46 INJECTION, SOLUTION INTRAVENOUS at 08:09

## 2023-09-28 NOTE — PROGRESS NOTES
[facility] Medicine Wards   Progress Note     Resident Team: [facility] Medicine List 3  Attending Physician: Steph Parker MD  Resident: Alan  Intern: [facility] Length of Stay: 0 days    Subjective:      Brief HPI:  Arcadio Scott is a 62 y.o. male with PMH of HTN, GERD, BPH, facial cysts s/p excision (2022), and sciatica - left leg, presented to the ED on 9/27/2023 with a CC of nausea and vomiting that started on Monday morning with an average 7-8 episodes of vomiting per day until arrival at the ED. His vomit was mainly the food he ate or liquid he drank. No recent changes in diet or sick contact nor addition of new meds reported. He has had similar symptoms a few months that resolved spontaneously. His symptoms were aggravated after he started bowel prep on Tuesday in preparation for his colonoscopy on Wednesday (9/27/2023). Also reported having minimal blood streaked stools yesterday evening, no active bleeding reported. Other associated symptoms include intermittent nausea and mild mid abdominal discomfort. In the ED: Vomiting stopped; sinus tachycardia on telemetry, BP stable; labs were significant for mild hyponatremia, K+ 2.9, Cl-92, metabolic acidosis, creatinine 2.22, calcium 10.5 . Hospital medicine team was consulted for the management of ETTA.    Interval History:   Patient had no acute events overnight. Patient was started on clear liquid diet yesterday and tolerated it well. He reports having nausea/vomiting for the past 2 years but worsened this past week. He denies any hemoptysis during this time but did report some dark stools. Patient's hemoglobin remained stable overnight. Held off on GI consult yesterday as colonoscopy was not urgent at this time. Continuing to consider GI consult if patient continues to have vomiting.    Review of Systems   Constitutional:  Negative for chills and fever.   HENT:  Negative for congestion, sinus pain and sore throat.    Eyes:  Negative for blurred vision and  double vision.   Respiratory:  Negative for cough, sputum production, shortness of breath and wheezing.    Cardiovascular:  Negative for chest pain, palpitations and leg swelling.   Gastrointestinal:  Positive for abdominal pain, nausea and vomiting.   Genitourinary:  Negative for dysuria.   Musculoskeletal:  Negative for myalgias.   Neurological:  Negative for dizziness, focal weakness, seizures and weakness.           Objective:     Vital Signs (Most Recent):  Temp: 97.9 °F (36.6 °C) (09/28/23 0730)  Pulse: 86 (09/28/23 0730)  Resp: 20 (09/28/23 0730)  BP: 128/78 (09/28/23 0730)  SpO2: 98 % (09/28/23 0752) Vital Signs (24h Range):  Temp:  [97.5 °F (36.4 °C)-98.8 °F (37.1 °C)] 97.9 °F (36.6 °C)  Pulse:  [] 86  Resp:  [17-20] 20  SpO2:  [95 %-100 %] 98 %  BP: (118-152)/(74-91) 128/78     Physical Exam  Constitutional:       Appearance: Normal appearance.   HENT:      Head: Normocephalic and atraumatic.      Nose: Nose normal.      Mouth/Throat:      Mouth: Mucous membranes are moist.      Pharynx: Oropharynx is clear.   Eyes:      Conjunctiva/sclera: Conjunctivae normal.      Pupils: Pupils are equal, round, and reactive to light.   Cardiovascular:      Rate and Rhythm: Normal rate and regular rhythm.      Pulses: Normal pulses.      Heart sounds: No murmur heard.  Pulmonary:      Effort: Pulmonary effort is normal. No respiratory distress.      Breath sounds: No wheezing.   Chest:      Chest wall: No tenderness.   Abdominal:      General: Abdomen is flat. Bowel sounds are normal. There is no distension.      Palpations: Abdomen is soft.      Tenderness: There is no abdominal tenderness.   Musculoskeletal:         General: No swelling. Normal range of motion.      Cervical back: Normal range of motion.   Skin:     General: Skin is warm.   Neurological:      General: No focal deficit present.      Mental Status: He is alert and oriented to person, place, and time.          Laboratory:  Most Recent Data:  CBC:    Recent Labs   Lab 09/27/23  1148 09/28/23  0416   WBC 8.65 6.38   HGB 14.8 12.8*   HCT 42.4 35.3*    121*     CMP:   Recent Labs   Lab 09/28/23  0416   CALCIUM 9.3   ALBUMIN 3.5   *   K 2.9*   CO2 22*   BUN 10.5   CREATININE 1.01   ALKPHOS 59   ALT 19   AST 47*   BILITOT 1.9*         Microbiology Data Reviewed: yes  Pertinent Findings:  N/A    Other Results:  EKG (my interpretation): normal EKG, normal sinus rhythm, unchanged from previous tracings.    Radiology:  Imaging Results              X-Ray Chest 1 View (Final result)  Result time 09/27/23 12:24:24      Final result by Parth Chambers MD (09/27/23 12:24:24)                   Impression:      NO ACUTE CARDIOPULMONARY PROCESS IDENTIFIED.      Electronically signed by: Parth Chambers  Date:    09/27/2023  Time:    12:24               Narrative:    EXAMINATION:  XR CHEST 1 VIEW    CLINICAL HISTORY:  vomiting;    TECHNIQUE:  One view    COMPARISON:  October 13, 2021.    FINDINGS:  Cardiopericardial silhouette is within normal limits.  No acute dense focal or segmental consolidation, congestive process, pleural effusions or pneumothorax.                                      Current Medications:     Infusions:       Scheduled:   amLODIPine  10 mg Oral Daily    heparin (porcine)  5,000 Units Subcutaneous Q12H    pantoprazole  40 mg Oral Daily    tamsulosin  0.4 mg Oral Daily        PRN:  acetaminophen, melatonin, ondansetron, sodium chloride 0.9%, traMADoL    Antibiotics and Day Number of Therapy:  N/A      Intake/Output Summary (Last 24 hours) at 9/28/2023 1116  Last data filed at 9/28/2023 1101  Gross per 24 hour   Intake 1625.03 ml   Output --   Net 1625.03 ml       Lines/Drains/Airways       Peripheral Intravenous Line  Duration                  Peripheral IV - Single Lumen 09/27/23 1214 18 G Anterior;Proximal;Right Forearm <1 day                    Assessment & Plan:     ETTA 2/2 dehydration  Nausea & vomiting  Electrolyte abnormalities  (hypokalemia, hypercalcemia)  -Received 2 L NS boluses, KCL 40 mEq PO x1, KCL 30 mEq IV  -Will give another 1 L of LR over 4 hours  -Tolerated clear liquid diet without issues; will upgrade to minced and soft diet  -BUN/Cr improved to 12.5/1.01 this AM  -Urine sodium 23 and urine potassium 55  -If patient not tolerating PO, will consider GI consult for EGD      Prolonged QTc  -EKG on admission revealed Qtc of 549  -Qtc this AM still prolonged 506  -Continue to avoid Qt prolonging medications at this time  -Will recheck again tomorrow AM to ensure resolution     HTN  -Continue home Amlodipine 10mg daily     BPH  -Continue home Flomax 0.4mg daily      GERD  -Continue home Protonix 40mg daily    CODE STATUS: Full Code  Access: Peripheral  Antibiotics: N/A  Diet:  Soft and minced  DVT Prophylaxis: Heparin  GI Prophylaxis: proton pump inhibitor per orders    Disposition: day 0 of admission for persistent nausea and vomiting. Escalating diet as tolerated. ETTA improved from yesterday.    Donnell Bundy MD  [facility], -II

## 2023-09-28 NOTE — PROGRESS NOTES
"Inpatient Nutrition Evaluation    Admit Date: 2023   Total duration of encounter: 1 day    Nutrition Recommendation/Prescription     ADAT to Low Na diet  Monitor Weight Weekly     Nutrition Assessment     Chart Review    Reason Seen: malnutrition screening tool (MST)    Malnutrition Screening Tool Results   Have you recently lost weight without trying?: Yes: 2-13 lbs  Have you been eating poorly because of a decreased appetite?: Yes   MST Score: 2     Diagnosis:  ETTA d/t dehydration, n/v, Electrolyte abnormalities, Prolonged QTC, HTN, BPH, GERD    Relevant Medical History: HTN, GERD, BPH, Facial cysts s/p excision, sciatica    Nutrition-Related Medications: Amlodipine, Mag Sulfate, Pantoprazole, KCL    Nutrition-Related Labs:  23 -- Glu 124 H, Na 131 L, K 2.9, BUN 10.9, Cr 1.01, Phos 1.6 L    Diet Order: Diet Soft & Bite Sized (IDDSI Level 6)  Oral Supplement Order: none  Appetite/Oral Intake: good/50-75% of meals  Factors Affecting Nutritional Intake: clear liquid diet, nausea, and vomiting  Food/[facility]/Cultural Preferences: none reported  Food Allergies: none reported       Wound(s):   skin intact    Comments    23 -- Pt with n/v since Monday resolved since Wednesday, Currently on CL diet only this am, reports good appetite this am and ready for regular foods; Diet advanced to Soft diet at time of charting noted; LBM ; No weight loss     Anthropometrics    Height: 5' 9" (175.3 cm) Height Method: Stated  Last Weight: 94.5 kg (208 lb 5.4 oz) (23 1151) Weight Method: Bed Scale  BMI (Calculated): 30.8  BMI Classification: obese grade I (BMI 30-34.9)        Ideal Body Weight (IBW), Male: 160 lb     % Ideal Body Weight, Male (lb): 128.28 %                 Usual Body Weight (UBW), k.7 kg  % Usual Body Weight: 100.64     Usual Weight Provided By: EMR weight history    Wt Readings from Last 5 Encounters:   23 94.5 kg (208 lb 5.4 oz)   23 94.3 kg (208 lb)   23 93.8 kg (206 " lb 12.8 oz)   05/30/23 92.1 kg (203 lb 0.7 oz)   04/25/23 91.1 kg (200 lb 13.4 oz)     Weight Change(s) Since Admission:  Admit Weight: 92 kg (202 lb 13.2 oz) (09/27/23 1133)  Weight stable, no wt loss noted    Patient Education    Not applicable.    Monitoring & Evaluation     Dietitian will monitor food and beverage intake, weight change, electrolyte/renal panel, and gastrointestinal profile.  Nutrition Risk/Follow-Up: low (follow-up in 5-7 days)  Patients assigned 'low nutrition risk' status do not qualify for a full nutritional assessment but will be monitored and re-evaluated in a 5-7 day time period. Please consult if re-evaluation needed sooner.

## 2023-09-28 NOTE — PLAN OF CARE
09/28/23 1200   Discharge Assessment   Assessment Type Discharge Planning Assessment   Confirmed/corrected address, phone number and insurance Yes   Confirmed Demographics Correct on Facesheet   Source of Information patient   When was your last doctors appointment?   (Haseeb Dunham)   Reason For Admission Melena, vomiting, ETTA   People in Home alone   Facility Arrived From: home   Do you expect to return to your current living situation? Yes   Do you have help at home or someone to help you manage your care at home? No   Prior to hospitilization cognitive status: Unable to Assess   Current cognitive status: Alert/Oriented   Walking or Climbing Stairs ambulation difficulty, requires equipment   Mobility Management Cane   Equipment Currently Used at Home cane, straight   Readmission within 30 days? No   Patient currently being followed by outpatient case management? No   Do you currently have service(s) that help you manage your care at home? No   Do you take prescription medications? Yes   Do you have prescription coverage? Yes   Coverage MEDICAID - [employer]   Do you have any problems affording any of your prescribed medications? No   Is the patient taking medications as prescribed? yes   Who is going to help you get home at discharge? Caroline   How do you get to doctors appointments? health plan transportation   Are you on dialysis? No   Do you take coumadin? No   DME Needed Upon Discharge  none   Discharge Plan discussed with: Patient   Discharge Plan A Home   Physical Activity   On average, how many days per week do you engage in moderate to strenuous exercise (like a brisk walk)? 0 days   On average, how many minutes do you engage in exercise at this level? 0 min   Financial Resource Strain   How hard is it for you to pay for the very basics like food, housing, medical care, and heating? Not hard   Housing Stability   In the last 12 months, was there a time when you were not able to pay the  mortgage or rent on time? N   In the last 12 months, was there a time when you did not have a steady place to sleep or slept in a shelter (including now)? N   Transportation Needs   In the past 12 months, has lack of transportation kept you from medical appointments or from getting medications? no   In the past 12 months, has lack of transportation kept you from meetings, work, or from getting things needed for daily living? No   Food Insecurity   Within the past 12 months, you worried that your food would run out before you got the money to buy more. Never true   Within the past 12 months, the food you bought just didn't last and you didn't have money to get more. Never true   Stress   Do you feel stress - tense, restless, nervous, or anxious, or unable to sleep at night because your mind is troubled all the time - these days? Not at all   Social Connections   In a typical week, how many times do you talk on the phone with family, friends, or neighbors? Once a week   How often do you get together with friends or relatives? Once   How often do you attend Hindu or Muslim services? Never   Do you belong to any clubs or organizations such as Hindu groups, unions, fraternal or athletic groups, or school groups? No   How often do you attend meetings of the clubs or organizations you belong to? Never   Are you , , , , never , or living with a partner? Never marrie   Alcohol Use   Q1: How often do you have a drink containing alcohol? 2-4 pr month   Q2: How many drinks containing alcohol do you have on a typical day when you are drinking? 1 or 2   Q3: How often do you have six or more drinks on one occasion? Never

## 2023-09-29 VITALS
HEART RATE: 90 BPM | TEMPERATURE: 99 F | SYSTOLIC BLOOD PRESSURE: 131 MMHG | DIASTOLIC BLOOD PRESSURE: 82 MMHG | HEIGHT: 69 IN | BODY MASS INDEX: 30.85 KG/M2 | RESPIRATION RATE: 18 BRPM | WEIGHT: 208.31 LBS | OXYGEN SATURATION: 99 %

## 2023-09-29 LAB
ALBUMIN SERPL-MCNC: 3.4 G/DL (ref 3.4–4.8)
ALBUMIN/GLOB SERPL: 1 RATIO (ref 1.1–2)
ALP SERPL-CCNC: 56 UNIT/L (ref 40–150)
ALT SERPL-CCNC: 20 UNIT/L (ref 0–55)
AST SERPL-CCNC: 61 UNIT/L (ref 5–34)
BASOPHILS # BLD AUTO: 0.05 X10(3)/MCL
BASOPHILS NFR BLD AUTO: 0.9 %
BILIRUB SERPL-MCNC: 1.3 MG/DL
BUN SERPL-MCNC: 5.4 MG/DL (ref 8.4–25.7)
CALCIUM SERPL-MCNC: 9.2 MG/DL (ref 8.8–10)
CHLORIDE SERPL-SCNC: 98 MMOL/L (ref 98–107)
CO2 SERPL-SCNC: 25 MMOL/L (ref 23–31)
CREAT SERPL-MCNC: 0.76 MG/DL (ref 0.73–1.18)
EOSINOPHIL # BLD AUTO: 0.2 X10(3)/MCL (ref 0–0.9)
EOSINOPHIL NFR BLD AUTO: 3.6 %
ERYTHROCYTE [DISTWIDTH] IN BLOOD BY AUTOMATED COUNT: 11.1 % (ref 11.5–17)
GFR SERPLBLD CREATININE-BSD FMLA CKD-EPI: >60 MLS/MIN/1.73/M2
GLOBULIN SER-MCNC: 3.4 GM/DL (ref 2.4–3.5)
GLUCOSE SERPL-MCNC: 119 MG/DL (ref 82–115)
HCT VFR BLD AUTO: 34.6 % (ref 42–52)
HGB BLD-MCNC: 12.4 G/DL (ref 14–18)
IMM GRANULOCYTES # BLD AUTO: 0.02 X10(3)/MCL (ref 0–0.04)
IMM GRANULOCYTES NFR BLD AUTO: 0.4 %
LYMPHOCYTES # BLD AUTO: 1.58 X10(3)/MCL (ref 0.6–4.6)
LYMPHOCYTES NFR BLD AUTO: 28.7 %
MAGNESIUM SERPL-MCNC: 1.7 MG/DL (ref 1.6–2.6)
MCH RBC QN AUTO: 31.1 PG (ref 27–31)
MCHC RBC AUTO-ENTMCNC: 35.8 G/DL (ref 33–36)
MCV RBC AUTO: 86.7 FL (ref 80–94)
MONOCYTES # BLD AUTO: 0.73 X10(3)/MCL (ref 0.1–1.3)
MONOCYTES NFR BLD AUTO: 13.3 %
NEUTROPHILS # BLD AUTO: 2.92 X10(3)/MCL (ref 2.1–9.2)
NEUTROPHILS NFR BLD AUTO: 53.1 %
NRBC BLD AUTO-RTO: 0 %
PHOSPHATE SERPL-MCNC: 3 MG/DL (ref 2.3–4.7)
PLATELET # BLD AUTO: 114 X10(3)/MCL (ref 130–400)
PLATELETS.RETICULATED NFR BLD AUTO: 16.7 % (ref 0.9–11.2)
PMV BLD AUTO: 11.5 FL (ref 7.4–10.4)
POTASSIUM SERPL-SCNC: 2.9 MMOL/L (ref 3.5–5.1)
PROT SERPL-MCNC: 6.8 GM/DL (ref 5.8–7.6)
RBC # BLD AUTO: 3.99 X10(6)/MCL (ref 4.7–6.1)
SODIUM SERPL-SCNC: 135 MMOL/L (ref 136–145)
WBC # SPEC AUTO: 5.5 X10(3)/MCL (ref 4.5–11.5)

## 2023-09-29 PROCEDURE — G0378 HOSPITAL OBSERVATION PER HR: HCPCS

## 2023-09-29 PROCEDURE — 94761 N-INVAS EAR/PLS OXIMETRY MLT: CPT

## 2023-09-29 PROCEDURE — 80053 COMPREHEN METABOLIC PANEL: CPT | Performed by: STUDENT IN AN ORGANIZED HEALTH CARE EDUCATION/TRAINING PROGRAM

## 2023-09-29 PROCEDURE — 85025 COMPLETE CBC W/AUTO DIFF WBC: CPT | Performed by: STUDENT IN AN ORGANIZED HEALTH CARE EDUCATION/TRAINING PROGRAM

## 2023-09-29 PROCEDURE — 94760 N-INVAS EAR/PLS OXIMETRY 1: CPT

## 2023-09-29 PROCEDURE — 84100 ASSAY OF PHOSPHORUS: CPT | Performed by: STUDENT IN AN ORGANIZED HEALTH CARE EDUCATION/TRAINING PROGRAM

## 2023-09-29 PROCEDURE — 83735 ASSAY OF MAGNESIUM: CPT | Performed by: STUDENT IN AN ORGANIZED HEALTH CARE EDUCATION/TRAINING PROGRAM

## 2023-09-29 PROCEDURE — 25000003 PHARM REV CODE 250: Performed by: STUDENT IN AN ORGANIZED HEALTH CARE EDUCATION/TRAINING PROGRAM

## 2023-09-29 RX ORDER — POTASSIUM CHLORIDE 20 MEQ/1
20 TABLET, EXTENDED RELEASE ORAL DAILY
Qty: 30 TABLET | Refills: 0 | Status: SHIPPED | OUTPATIENT
Start: 2023-09-29 | End: 2023-10-29

## 2023-09-29 RX ORDER — POTASSIUM CHLORIDE 7.45 MG/ML
10 INJECTION INTRAVENOUS
Status: DISCONTINUED | OUTPATIENT
Start: 2023-09-29 | End: 2023-09-29 | Stop reason: HOSPADM

## 2023-09-29 RX ORDER — POTASSIUM CHLORIDE 7.45 MG/ML
10 INJECTION INTRAVENOUS
Status: DISCONTINUED | OUTPATIENT
Start: 2023-09-29 | End: 2023-09-29

## 2023-09-29 RX ADMIN — AMLODIPINE BESYLATE 10 MG: 10 TABLET ORAL at 08:09

## 2023-09-29 RX ADMIN — TAMSULOSIN HYDROCHLORIDE 0.4 MG: 0.4 CAPSULE ORAL at 08:09

## 2023-09-29 RX ADMIN — POTASSIUM BICARBONATE 50 MEQ: 977.5 TABLET, EFFERVESCENT ORAL at 06:09

## 2023-09-29 RX ADMIN — POTASSIUM BICARBONATE 25 MEQ: 977.5 TABLET, EFFERVESCENT ORAL at 09:09

## 2023-09-29 RX ADMIN — PANTOPRAZOLE SODIUM 40 MG: 40 TABLET, DELAYED RELEASE ORAL at 08:09
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
## 2023-10-09 ENCOUNTER — OFFICE VISIT (OUTPATIENT)
Dept: INTERNAL MEDICINE | Facility: CLINIC | Age: 63
End: 2023-10-09
Payer: MEDICAID

## 2023-10-09 VITALS
BODY MASS INDEX: 30.61 KG/M2 | HEART RATE: 66 BPM | SYSTOLIC BLOOD PRESSURE: 148 MMHG | HEIGHT: 69 IN | WEIGHT: 206.63 LBS | DIASTOLIC BLOOD PRESSURE: 78 MMHG | TEMPERATURE: 98 F | RESPIRATION RATE: 20 BRPM

## 2023-10-09 DIAGNOSIS — E87.6 HYPOKALEMIA: Primary | ICD-10-CM

## 2023-10-09 DIAGNOSIS — I10 HYPERTENSION, UNSPECIFIED TYPE: ICD-10-CM

## 2023-10-09 DIAGNOSIS — Z12.5 SCREENING FOR PROSTATE CANCER: ICD-10-CM

## 2023-10-09 DIAGNOSIS — Z11.3 ROUTINE SCREENING FOR STI (SEXUALLY TRANSMITTED INFECTION): ICD-10-CM

## 2023-10-09 DIAGNOSIS — Z13.1 SCREENING FOR DIABETES MELLITUS: ICD-10-CM

## 2023-10-09 PROCEDURE — 1159F MED LIST DOCD IN RCRD: CPT | Mod: CPTII,,, | Performed by: NURSE PRACTITIONER

## 2023-10-09 PROCEDURE — 3077F SYST BP >= 140 MM HG: CPT | Mod: CPTII,,, | Performed by: NURSE PRACTITIONER

## 2023-10-09 PROCEDURE — 99214 OFFICE O/P EST MOD 30 MIN: CPT | Mod: PBBFAC | Performed by: NURSE PRACTITIONER

## 2023-10-09 PROCEDURE — 3044F HG A1C LEVEL LT 7.0%: CPT | Mod: CPTII,,, | Performed by: NURSE PRACTITIONER

## 2023-10-09 PROCEDURE — 3044F PR MOST RECENT HEMOGLOBIN A1C LEVEL <7.0%: ICD-10-PCS | Mod: CPTII,,, | Performed by: NURSE PRACTITIONER

## 2023-10-09 PROCEDURE — 1160F PR REVIEW ALL MEDS BY PRESCRIBER/CLIN PHARMACIST DOCUMENTED: ICD-10-PCS | Mod: CPTII,,, | Performed by: NURSE PRACTITIONER

## 2023-10-09 PROCEDURE — 3078F PR MOST RECENT DIASTOLIC BLOOD PRESSURE < 80 MM HG: ICD-10-PCS | Mod: CPTII,,, | Performed by: NURSE PRACTITIONER

## 2023-10-09 PROCEDURE — 3008F BODY MASS INDEX DOCD: CPT | Mod: CPTII,,, | Performed by: NURSE PRACTITIONER

## 2023-10-09 PROCEDURE — 1159F PR MEDICATION LIST DOCUMENTED IN MEDICAL RECORD: ICD-10-PCS | Mod: CPTII,,, | Performed by: NURSE PRACTITIONER

## 2023-10-09 PROCEDURE — 1160F RVW MEDS BY RX/DR IN RCRD: CPT | Mod: CPTII,,, | Performed by: NURSE PRACTITIONER

## 2023-10-09 PROCEDURE — 99214 OFFICE O/P EST MOD 30 MIN: CPT | Mod: S$PBB,,, | Performed by: NURSE PRACTITIONER

## 2023-10-09 PROCEDURE — 3008F PR BODY MASS INDEX (BMI) DOCUMENTED: ICD-10-PCS | Mod: CPTII,,, | Performed by: NURSE PRACTITIONER

## 2023-10-09 PROCEDURE — 3077F PR MOST RECENT SYSTOLIC BLOOD PRESSURE >= 140 MM HG: ICD-10-PCS | Mod: CPTII,,, | Performed by: NURSE PRACTITIONER

## 2023-10-09 PROCEDURE — 99214 PR OFFICE/OUTPT VISIT, EST, LEVL IV, 30-39 MIN: ICD-10-PCS | Mod: S$PBB,,, | Performed by: NURSE PRACTITIONER

## 2023-10-09 PROCEDURE — 3078F DIAST BP <80 MM HG: CPT | Mod: CPTII,,, | Performed by: NURSE PRACTITIONER

## 2023-10-09 RX ORDER — AMLODIPINE BESYLATE 10 MG/1
10 TABLET ORAL DAILY
Qty: 30 TABLET | Refills: 5 | Status: SHIPPED | OUTPATIENT
Start: 2023-10-09 | End: 2023-12-06 | Stop reason: SDUPTHER
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
## 2023-10-09 NOTE — PATIENT INSTRUCTIONS
Sotero Ervin,     If you are due for any health screening(s) below please notify me so we can arrange them to be ordered and scheduled. Most healthy patients at your age complete them, but you are free to accept or refuse.     If you can't do it, I'll definitely understand. If you can, I'd certainly appreciate it!    Tests to Keep You Healthy    Colon Cancer Screening: DUE  Last Blood Pressure <= 139/89 (10/9/2023): Yes      Its time for your colon cancer screening     Colorectal cancer is one of the leading causes of cancer death for men and women but it doesnt have to be. Screenings can prevent colorectal cancer or find it early enough to treat and cure the disease.     Our records indicate that you may be overdue for colon cancer screening. A colonoscopy or stool screening test can help identify patients at risk for developing colon cancer. Cancer screenings save lives, so schedule yours today to stay healthy.     A colonoscopy is the preferred test for detecting colon cancer. It is needed only once every 10 years if results are negative. While you are sedated, a flexible, lighted tube with a tiny camera is inserted into the rectum and advanced through the colon to look for cancers.     An alternative screening test that is used at home and returned to the lab may also be used. It detects hidden blood in bowel movements which could indicate cancer in the colon. If results are positive, you will need a colonoscopy to determine if the blood is a sign of cancer. This type of follow up (diagnostic) colonoscopy usually requires additional copays as required by your insurance provider.     If you recently had your colon cancer screening performed outside of [facility], please let your Health care team know so that they can update your health record. Please contact your PCP if you have any questions.    Lets manage your high blood pressure     Your blood pressure was above 140/90 today during your visit. We  recommend that you schedule a nurse visit in two weeks to check your blood pressure and discuss ways to support your health goals.     You can also manage your health and record your blood pressure from the comfort of home by keeping a daily blood pressure log. These results are shared with and reviewed by your provider. Please print this form (Daily Blood Pressure Log) to assist you in keeping track of your blood pressure at home.     Schedule your nurse visit in two weeks to learn more about how to track and manage high blood pressure.    Daily Blood Pressure Log    Name:__________________________________                  Date of Birth:_________    Average Blood Pressure:  __________      Date: Time  (a.m.) Blood  Pressure: Pulse  Rate: Time  (p.m.) Blood  Pressure : Pulse  Rate:   Sample 8:37 127/83 84                                                                                                                                                                                   Were here to help you quit smoking     Our records indicated that you are still smoking. One of the best things you can do for your health is to stop smoking and we are here to help.     Talk with your provider about our Smoking Cessation Program and how we can support you on your journey.

## 2023-10-09 NOTE — ASSESSMENT & PLAN NOTE
Elevated, asymptomatic  Refilled Amlodipine. Advised to resume and remain compliant  Educated on aerobic exercise (3-5 days/week) and a low-fat, low-sodium diet  Avoid excess ETOH consumption. Smoking cessation if applicable  ED precautions (s/s of CVA, etc)  RTC in 4 weeks to re-check BP

## 2023-10-16 DIAGNOSIS — Z12.11 SCREEN FOR COLON CANCER: Primary | ICD-10-CM

## 2023-10-16 RX ORDER — POLYETHYLENE GLYCOL 3350, SODIUM SULFATE, SODIUM CHLORIDE, POTASSIUM CHLORIDE, SODIUM ASCORBATE, AND ASCORBIC ACID 7.5-2.691G
KIT ORAL
Qty: 1 KIT | Refills: 0 | Status: SHIPPED | OUTPATIENT
Start: 2023-10-16 | End: 2023-10-26

## 2023-10-26 ENCOUNTER — OFFICE VISIT (OUTPATIENT)
Dept: NEUROLOGY | Facility: CLINIC | Age: 63
End: 2023-10-26
Payer: MEDICAID

## 2023-10-26 VITALS
HEIGHT: 69 IN | HEART RATE: 76 BPM | BODY MASS INDEX: 30.36 KG/M2 | OXYGEN SATURATION: 100 % | SYSTOLIC BLOOD PRESSURE: 124 MMHG | WEIGHT: 205 LBS | DIASTOLIC BLOOD PRESSURE: 78 MMHG
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
## 2023-10-26 DIAGNOSIS — G89.29 CHRONIC BILATERAL LOW BACK PAIN WITH BILATERAL SCIATICA: ICD-10-CM

## 2023-10-26 DIAGNOSIS — F81.0 READING IMPAIRMENT: ICD-10-CM

## 2023-10-26 DIAGNOSIS — M51.36 DDD (DEGENERATIVE DISC DISEASE), LUMBAR: ICD-10-CM

## 2023-10-26 DIAGNOSIS — M54.42 CHRONIC BILATERAL LOW BACK PAIN WITH BILATERAL SCIATICA: ICD-10-CM

## 2023-10-26 DIAGNOSIS — G60.8 PERIPHERAL SENSORY-MOTOR AXONAL POLYNEUROPATHY: Primary | ICD-10-CM

## 2023-10-26 DIAGNOSIS — M54.41 CHRONIC BILATERAL LOW BACK PAIN WITH BILATERAL SCIATICA: ICD-10-CM

## 2023-10-26 PROBLEM — M51.369 DDD (DEGENERATIVE DISC DISEASE), LUMBAR: Status: ACTIVE | Noted: 2023-10-26
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
## 2023-10-26 PROCEDURE — 1159F PR MEDICATION LIST DOCUMENTED IN MEDICAL RECORD: ICD-10-PCS | Mod: CPTII,,, | Performed by: NURSE PRACTITIONER

## 2023-10-26 PROCEDURE — 3078F PR MOST RECENT DIASTOLIC BLOOD PRESSURE < 80 MM HG: ICD-10-PCS | Mod: CPTII,,, | Performed by: NURSE PRACTITIONER

## 2023-10-26 PROCEDURE — 3008F PR BODY MASS INDEX (BMI) DOCUMENTED: ICD-10-PCS | Mod: CPTII,,, | Performed by: NURSE PRACTITIONER

## 2023-10-26 PROCEDURE — 99214 PR OFFICE/OUTPT VISIT, EST, LEVL IV, 30-39 MIN: ICD-10-PCS | Mod: S$PBB,,, | Performed by: NURSE PRACTITIONER

## 2023-10-26 PROCEDURE — 3044F HG A1C LEVEL LT 7.0%: CPT | Mod: CPTII,,, | Performed by: NURSE PRACTITIONER

## 2023-10-26 PROCEDURE — 99213 OFFICE O/P EST LOW 20 MIN: CPT | Mod: PBBFAC | Performed by: NURSE PRACTITIONER

## 2023-10-26 PROCEDURE — 3044F PR MOST RECENT HEMOGLOBIN A1C LEVEL <7.0%: ICD-10-PCS | Mod: CPTII,,, | Performed by: NURSE PRACTITIONER

## 2023-10-26 PROCEDURE — 99214 OFFICE O/P EST MOD 30 MIN: CPT | Mod: S$PBB,,, | Performed by: NURSE PRACTITIONER

## 2023-10-26 PROCEDURE — 1159F MED LIST DOCD IN RCRD: CPT | Mod: CPTII,,, | Performed by: NURSE PRACTITIONER

## 2023-10-26 PROCEDURE — 1160F RVW MEDS BY RX/DR IN RCRD: CPT | Mod: CPTII,,, | Performed by: NURSE PRACTITIONER

## 2023-10-26 PROCEDURE — 3074F PR MOST RECENT SYSTOLIC BLOOD PRESSURE < 130 MM HG: ICD-10-PCS | Mod: CPTII,,, | Performed by: NURSE PRACTITIONER

## 2023-10-26 PROCEDURE — 3008F BODY MASS INDEX DOCD: CPT | Mod: CPTII,,, | Performed by: NURSE PRACTITIONER

## 2023-10-26 PROCEDURE — 3078F DIAST BP <80 MM HG: CPT | Mod: CPTII,,, | Performed by: NURSE PRACTITIONER

## 2023-10-26 PROCEDURE — 3074F SYST BP LT 130 MM HG: CPT | Mod: CPTII,,, | Performed by: NURSE PRACTITIONER

## 2023-10-26 PROCEDURE — 1160F PR REVIEW ALL MEDS BY PRESCRIBER/CLIN PHARMACIST DOCUMENTED: ICD-10-PCS | Mod: CPTII,,, | Performed by: NURSE PRACTITIONER

## 2023-10-26 NOTE — PROGRESS NOTES
[facility] Follow Up Office Visit Note    Initial Visit Date: 1/10/2023  Last Visit Date: 7/14/2023  Current Visit Date:  10/26/2023    Chief Complaint:     Chief Complaint   Patient presents with    Peripheral sensory-motor axonal polyneuropathy     Patient reports symptoms are about the same. Numbness, tingling in feet. Difficulty with balance and walking. Pain in lower extremities.      History of Present Illness:      This is 62 y.o. male with history of chronic lower back pain, alcohol dependence who was referred for bilateral lower extremity numbness for 1 + year.  Pt was last seen 7/14/2023. During that visit, Lyrica 50mg PO BID was initiated. MRI L-spine ordered and referral sent to Dr. Elaine    Interim Hx:  Today, Pt states improvement w/burning/tingling/numbness feeling to feet w/Lyrica, but only taking daily instead of BID (Pt cannot read) so wears off mid-day. Improved w/elevating legs. Worsens with activity. Fell a few weeks ago, states he was walking without cane and lost his balance. Interferes w/ADLs. Drinks <1-2 beers weekly. Decreased cigar smoking to 1/week. Not working at this time. Continues w/R lower back pain that he has had for years, but more consistent for last few months. Pain scale 9/10, described as aching. Interferes w/ADLs. Worse with prolonged sitting, improves w/standing. Has appt w/Dr. Elaine November 14th.    Presenting Hx:  Patient states that he had bilateral LBP radiating down bilateral LE, L>R. He reports decreased sensation in bilateral feet, left worse than right. Denied any trip and falls. Reports bilateral knee pain, left > right. Drinks alcohol throughout the week, drinking around a case of beer every 2 weeks. Quit smoking in 11/2019. Denied any saddle anesthesia. Denied any urinary incontinence. 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   Medications:     Current Outpatient Medications on File Prior to Visit   Medication Sig Dispense Refill    amLODIPine  (NORVASC) 10 MG tablet Take 1 tablet (10 mg total) by mouth once daily. 30 tablet 5    pantoprazole (PROTONIX) 40 MG tablet Take 1 tablet (40 mg total) by mouth once daily. 30 tablet 11    potassium chloride SA (K-DUR,KLOR-CON) 20 MEQ tablet Take 1 tablet (20 mEq total) by mouth once daily. 30 tablet 0    pregabalin (LYRICA) 50 MG capsule Take 1 capsule (50 mg total) by mouth 2 (two) times daily. 60 capsule 3    tamsulosin (FLOMAX) 0.4 mg Cap Take 1 capsule (0.4 mg total) by mouth once daily. 90 capsule 3    [DISCONTINUED] polyethylene glycol (MOVIPREP) 100-7.5-2.691 gram solution Take as directed per instructions provided by GI Clinic (Patient not taking: Reported on 10/26/2023) 1 kit 0     Current Facility-Administered Medications on File Prior to Visit   Medication Dose Route Frequency Provider Last Rate Last Admin    LIDOcaine (PF) 10 mg/ml (1%) injection 10 mg  1 mL Intradermal Once Marina De La Torre, AN           Labs:     Results for orders placed or performed in visit on 10/09/23   Hemoglobin A1C   Result Value Ref Range    Hemoglobin A1c 5.2 <=7.0 %    Estimated Average Glucose 102.5 mg/dL   TSH   Result Value Ref Range    TSH 0.497 0.350 - 4.940 uIU/mL   PSA, Screening   Result Value Ref Range    Prostate Specific Antigen 0.88 <=4.00 ng/mL   Lipid Panel   Result Value Ref Range    Cholesterol Total 173 <=200 mg/dL    HDL Cholesterol 52 35 - 60 mg/dL    Triglyceride 79 34 - 140 mg/dL    Cholesterol/HDL Ratio 3 0 - 5    Very Low Density Lipoprotein 16     LDL Cholesterol 105.00 50.00 - 140.00 mg/dL   Hepatitis Panel, Acute   Result Value Ref Range    Hepatitis A IgM Nonreactive Nonreactive    Hepatitis B Core IgM Nonreactive Nonreactive    Hepatitis B Surface Antigen Nonreactive Nonreactive    Hep C Ab Interp Nonreactive Nonreactive   Comprehensive Metabolic Panel   Result Value Ref Range    Sodium Level 139 136 - 145 mmol/L    Potassium Level 3.7 3.5 - 5.1 mmol/L    Chloride 110 (H) 98 - 107 mmol/L     Carbon Dioxide 22 (L) 23 - 31 mmol/L    Glucose Level 103 82 - 115 mg/dL    Blood Urea Nitrogen 7.1 (L) 8.4 - 25.7 mg/dL    Creatinine 0.86 0.73 - 1.18 mg/dL    Calcium Level Total 9.3 8.8 - 10.0 mg/dL    Protein Total 7.4 5.8 - 7.6 gm/dL    Albumin Level 3.7 3.4 - 4.8 g/dL    Globulin 3.7 (H) 2.4 - 3.5 gm/dL    Albumin/Globulin Ratio 1.0 (L) 1.1 - 2.0 ratio    Bilirubin Total 0.3 <=1.5 mg/dL    Alkaline Phosphatase 56 40 - 150 unit/L    Alanine Aminotransferase 23 0 - 55 unit/L    Aspartate Aminotransferase 24 5 - 34 unit/L    eGFR >60 mls/min/1.73/m2   CBC with Differential   Result Value Ref Range    WBC 6.65 4.50 - 11.50 x10(3)/mcL    RBC 4.08 (L) 4.70 - 6.10 x10(6)/mcL    Hgb 12.5 (L) 14.0 - 18.0 g/dL    Hct 37.0 (L) 42.0 - 52.0 %    MCV 90.7 80.0 - 94.0 fL    MCH 30.6 27.0 - 31.0 pg    MCHC 33.8 33.0 - 36.0 g/dL    RDW 12.9 11.5 - 17.0 %    Platelet 225 130 - 400 x10(3)/mcL    MPV 10.9 (H) 7.4 - 10.4 fL    Neut % 59.8 %    Lymph % 25.1 %    Mono % 10.5 %    Eos % 2.9 %    Basophil % 0.9 %    Lymph # 1.67 0.6 - 4.6 x10(3)/mcL    Neut # 3.98 2.1 - 9.2 x10(3)/mcL    Mono # 0.70 0.1 - 1.3 x10(3)/mcL    Eos # 0.19 0 - 0.9 x10(3)/mcL    Baso # 0.06 <=0.2 x10(3)/mcL    IG# 0.05 (H) 0 - 0.04 x10(3)/mcL    IG% 0.8 %    NRBC% 0.0 %     Studies:      - MRI L-spine without contrast: Pending.  - CT L-spine without contrast: Pending    - NCS/EMG bilateral LE 8/12/2022 by Dr. Marck London:  I have reviewed the study independently and with the patient. Incomplete study.  Also, no reference range.  No conduction velocity.  Latency and amplitude of SNAP and CMAP mostly symmetric without reference range.  No F-waves.     Review of Systems:     Review of Systems   All other systems reviewed and are negative.    As per HPI    Physical Exams:     Vitals:    10/26/23 0903   BP: 124/78   Pulse: 76       Physical Exam  Vitals and nursing note reviewed.   Constitutional:       Appearance: Normal appearance.   HENT:      Head:  Normocephalic and atraumatic.      Nose: Nose normal.      Mouth/Throat:      Mouth: Mucous membranes are moist.      Pharynx: Oropharynx is clear.   Eyes:      Conjunctiva/sclera: Conjunctivae normal.   Cardiovascular:      Rate and Rhythm: Normal rate and regular rhythm.      Pulses: Normal pulses.      Heart sounds: Normal heart sounds.   Pulmonary:      Effort: Pulmonary effort is normal.      Breath sounds: Normal breath sounds.   Abdominal:      General: Abdomen is flat.      Palpations: Abdomen is soft.   Musculoskeletal:         General: Normal range of motion.      Cervical back: Normal range of motion.   Neurological:      Mental Status: He is alert.   Psychiatric:         Mood and Affect: Mood normal.     Comprehensive Neurological Exam:  Mental Status: Alert Oriented to Self, Date, and Place. Comprehension wnl. No dysarthria.   CN II - XII: DUARTE, No APD, VFFC, No ptosis OU, EOMI without nystagmus, LT/Temp symmetric in CN V1-3 distribution, Hearing grossly intact, Face Symmetric, Tongue and Uvula midline, Trapezius symmetric bilateral.   Motor: tone and bulk wnl throughout, no abnormal involuntary or voluntary movements, 5/5 to confrontation, Fine finger movements wnl b/l, No pronator drift.   Sensory: LT, Proprioception, Vibration, PP, Temp symmetric subjectively decreased in bilateral LE throughout.   Reflexes: 2+ throughout except for 1+ in bilateral AJ, plantar reflexes downward bilateral   Cerebellar: FNF wnl b/l, RAHM wnl b/l.  Romberg: Negative  Gait: antalgic gait, stooped posture    Assessment:     This is 62 y.o. male with history of chronic lower back pain, alcohol dependence who was referred for bilateral lower extremity numbness. NCS/EMG results were inconclusive due to missing components based on report. Exam and history more consistent with lumbar radiculopathy. Neuropathy improved w/Lyrica, but only taking once daily (difficulty reading) so effects wear off mid-day. Admits to one fall  since  last visit.     Problem List Items Addressed This Visit          Neuro    Peripheral sensory-motor axonal polyneuropathy - Primary    DDD (degenerative disc disease), lumbar    Reading impairment       Orthopedic    Chronic bilateral low back pain with bilateral sciatica    Fall     Plan:   [] c/w Lyrica 50mg PO BID consistently (only taking daily)  [] fall precautions  [] keep appt w/Dr. Elaine    RTC 4 Months     I have explained the treatment plan, diagnosis, and prognosis to patient. All questions are answered to the best of my knowledge.     Face to face time 30 minutes, including documentation, chart review, counseling, education, review of test results, relevant medical records, and coordination of care.     I have explained the treatment plan, diagnosis, and prognosis to patient. All questions are answered to the best of my knowledge.     10/26/2023

## 2023-10-30 ENCOUNTER — HOSPITAL ENCOUNTER (OUTPATIENT)
Facility: HOSPITAL | Age: 63
Discharge: HOME | End: 2023-10-30
Attending: INTERNAL MEDICINE | Admitting: INTERNAL MEDICINE
Payer: MEDICAID

## 2023-10-30 DIAGNOSIS — K21.9 GASTROESOPHAGEAL REFLUX DISEASE, UNSPECIFIED WHETHER ESOPHAGITIS PRESENT: ICD-10-CM

## 2023-10-30 DIAGNOSIS — R11.10 INTRACTABLE VOMITING: Primary | ICD-10-CM

## 2023-10-30 DIAGNOSIS — Z86.010 PERSONAL HISTORY OF COLONIC POLYPS: ICD-10-CM

## 2023-10-30 DIAGNOSIS — R11.2 NAUSEA AND VOMITING, UNSPECIFIED VOMITING TYPE: ICD-10-CM

## 2023-10-30 PROCEDURE — 88305 TISSUE EXAM BY PATHOLOGIST: CPT | Mod: TC | Performed by: INTERNAL MEDICINE

## 2023-10-30 PROCEDURE — 27201423 OPTIME MED/SURG SUP & DEVICES STERILE SUPPLY: Performed by: INTERNAL MEDICINE

## 2023-10-30 PROCEDURE — 43239 EGD BIOPSY SINGLE/MULTIPLE: CPT | Performed by: INTERNAL MEDICINE

## 2023-10-30 PROCEDURE — 37000009 HC ANESTHESIA EA ADD 15 MINS: Performed by: INTERNAL MEDICINE

## 2023-10-30 PROCEDURE — 88312 SPECIAL STAINS GROUP 1: CPT | Mod: TC | Performed by: INTERNAL MEDICINE

## 2023-10-30 PROCEDURE — 37000008 HC ANESTHESIA 1ST 15 MINUTES: Performed by: INTERNAL MEDICINE

## 2023-10-30 PROCEDURE — D9220A PRA ANESTHESIA: Mod: ,,, | Performed by: NURSE ANESTHETIST, CERTIFIED REGISTERED

## 2023-10-30 PROCEDURE — 63600175 PHARM REV CODE 636 W HCPCS: Performed by: ANESTHESIOLOGY

## 2023-10-30 PROCEDURE — D9220A PRA ANESTHESIA: ICD-10-PCS | Mod: ,,, | Performed by: NURSE ANESTHETIST, CERTIFIED REGISTERED

## 2023-10-30 PROCEDURE — 45385 COLONOSCOPY W/LESION REMOVAL: CPT | Performed by: INTERNAL MEDICINE

## 2023-10-30 PROCEDURE — 25000003 PHARM REV CODE 250: Performed by: NURSE ANESTHETIST, CERTIFIED REGISTERED

## 2023-10-30 PROCEDURE — 63600175 PHARM REV CODE 636 W HCPCS: Performed by: NURSE ANESTHETIST, CERTIFIED REGISTERED

## 2023-10-30 RX ORDER — LIDOCAINE HYDROCHLORIDE 20 MG/ML
INJECTION, SOLUTION EPIDURAL; INFILTRATION; INTRACAUDAL; PERINEURAL
Status: DISCONTINUED | OUTPATIENT
Start: 2023-10-30 | End: 2023-10-30

## 2023-10-30 RX ORDER — PROPOFOL 10 MG/ML
VIAL (ML) INTRAVENOUS
Status: DISCONTINUED | OUTPATIENT
Start: 2023-10-30 | End: 2023-10-30

## 2023-10-30 RX ORDER — SODIUM CHLORIDE, SODIUM LACTATE, POTASSIUM CHLORIDE, CALCIUM CHLORIDE 600; 310; 30; 20 MG/100ML; MG/100ML; MG/100ML; MG/100ML
INJECTION, SOLUTION INTRAVENOUS CONTINUOUS
Status: ACTIVE | OUTPATIENT
Start: 2023-10-30

## 2023-10-30 RX ADMIN — PROPOFOL 20 MG: 10 INJECTION, EMULSION INTRAVENOUS at 09:10

## 2023-10-30 RX ADMIN — PROPOFOL 100 MG: 10 INJECTION, EMULSION INTRAVENOUS at 08:10

## 2023-10-30 RX ADMIN — PROPOFOL 40 MG: 10 INJECTION, EMULSION INTRAVENOUS at 09:10

## 2023-10-30 RX ADMIN — PROPOFOL 30 MG: 10 INJECTION, EMULSION INTRAVENOUS at 09:10

## 2023-10-30 RX ADMIN — SODIUM CHLORIDE, POTASSIUM CHLORIDE, SODIUM LACTATE AND CALCIUM CHLORIDE: 600; 310; 30; 20 INJECTION, SOLUTION INTRAVENOUS at 08:10

## 2023-10-30 RX ADMIN — PROPOFOL 30 MG: 10 INJECTION, EMULSION INTRAVENOUS at 08:10

## 2023-10-30 RX ADMIN — LIDOCAINE HYDROCHLORIDE 100 MG: 20 INJECTION, SOLUTION EPIDURAL; INFILTRATION; INTRACAUDAL; PERINEURAL at 08:10

## 2023-10-30 RX ADMIN — PROPOFOL 50 MG: 10 INJECTION, EMULSION INTRAVENOUS at 09:10

## 2023-10-30 RX ADMIN — PROPOFOL 60 MG: 10 INJECTION, EMULSION INTRAVENOUS at 09:10

## 2023-10-30 NOTE — TRANSFER OF CARE
"Anesthesia Transfer of Care Note    Patient: Arcadio Scott    Procedure(s) Performed: Procedure(s) (LRB):  EGD (N/A)  COLONOSCOPY, WITH POLYPECTOMY USING SNARE (N/A)    Patient location: GI    Anesthesia Type: general    Post pain: adequate analgesia    Post assessment: no apparent anesthetic complications    Post vital signs: stable    Level of consciousness: awake    Nausea/Vomiting: no nausea/vomiting    Complications: none    Transfer of care protocol was followed      Last vitals:   Visit Vitals  /79 (BP Location: Right arm)   Pulse 70   Temp 36.6 °C (97.9 °F)   Resp 18   Ht 5' 9" (1.753 m)   Wt 94.3 kg (208 lb)   SpO2 100%   BMI 30.72 kg/m²     "

## 2023-10-30 NOTE — ANESTHESIA POSTPROCEDURE EVALUATION
Anesthesia Post Evaluation    Patient: Arcadio Scott    Procedure(s) Performed: Procedure(s) (LRB):  EGD (N/A)  COLONOSCOPY, WITH POLYPECTOMY USING SNARE (N/A)    Final Anesthesia Type: general      Patient location during evaluation: GI PACU  Patient participation: Yes- Able to Participate  Level of consciousness: awake and alert  Post-procedure vital signs: reviewed and stable  Pain management: adequate  Airway patency: patent    PONV status at discharge: No PONV  Anesthetic complications: no      Cardiovascular status: hemodynamically stable  Respiratory status: spontaneous ventilation  Hydration status: euvolemic  Follow-up not needed.          Vitals Value Taken Time   /79 10/30/23 0753   Temp 36.6 °C (97.9 °F) 10/30/23 0753   Pulse 70 10/30/23 0753   Resp 18 10/30/23 0753   SpO2 100 % 10/30/23 0753         No case tracking events are documented in the log.      Pain/Rosanna Score: No data recorded

## 2023-10-30 NOTE — PROVATION PATIENT INSTRUCTIONS
Discharge Summary/Instructions after an Endoscopic Procedure  Patient Name: Arcadio Scott  Patient MRN: [id number]  Patient YOB: 1960  Monday, October 30, 2023  Ngoc Fenton MD  Dear patient,  As a result of recent federal legislation (The Federal Cures Act), you may   receive lab or pathology results from your procedure in your [facility]   account before your physician is able to contact you. Your physician or   their representative will relay the results to you with their   recommendations at their soonest availability.  Thank you,  RESTRICTIONS:  During your procedure today, you received medications for sedation.  These   medications may affect your judgment, balance and coordination.  Therefore,   for 24 hours, you have the following restrictions:   - DO NOT drive a car, operate machinery, make legal/financial decisions,   sign important papers or drink alcohol.    ACTIVITY:  Today: no heavy lifting, straining or running due to procedural   sedation/anesthesia.  The following day: return to full activity including work.  DIET:  Eat and drink normally unless instructed otherwise.     TREATMENT FOR COMMON SIDE EFFECTS:  - Mild abdominal pain, nausea, belching, bloating or excessive gas:  rest,   eat lightly and use a heating pad.  - Sore Throat: treat with throat lozenges and/or gargle with warm salt   water.  - Because air was used during the procedure, expelling large amounts of air   from your rectum or belching is normal.  - If a bowel prep was taken, you may not have a bowel movement for 1-3 days.    This is normal.  SYMPTOMS TO WATCH FOR AND REPORT TO YOUR PHYSICIAN:  1. Abdominal pain or bloating, other than gas cramps.  2. Chest pain.  3. Back pain.  4. Signs of infection such as: chills or fever occurring within 24 hours   after the procedure.  5. Rectal bleeding, which would show as bright red, maroon, or black stools.   (A tablespoon of blood from the rectum is not serious, especially  if   hemorrhoids are present.)  6. Vomiting.  7. Weakness or dizziness.  GO DIRECTLY TO THE NEAREST EMERGENCY ROOM IF YOU HAVE ANY OF THE FOLLOWING:      Difficulty breathing              Chills and/or fever over 101 F   Persistent vomiting and/or vomiting blood   Severe abdominal pain   Severe chest pain   Black, tarry stools   Bleeding- more than one tablespoon   Any other symptom or condition that you feel may need urgent attention  Your doctor recommends these additional instructions:  If any biopsies were taken, your doctors clinic will contact you in 1 to 2   weeks with any results.  - Patient has a contact number available for emergencies.  The signs and   symptoms of potential delayed complications were discussed with the   patient.  Return to normal activities tomorrow.  Written discharge   instructions were provided to the patient.   - Discharge patient to home.   - Resume previous diet.   - Continue present medications.   - Await pathology results.   - Repeat colonoscopy in 1-2 years for surveillance.  For questions, problems or results please call your physician - Ngoc Fenton MD at Work:  [phone], Work:  [phone].  [facility] , EMERGENCY ROOM PHONE NUMBER: [phone]  IF A COMPLICATION OR EMERGENCY SITUATION ARISES AND YOU ARE UNABLE TO REACH   YOUR PHYSICIAN - GO DIRECTLY TO THE EMERGENCY ROOM.  Ngoc Fenton MD  10/30/2023 9:59:57 AM  This report has been verified and signed electronically.  Dear patient,  As a result of recent federal legislation (The Federal Cures Act), you may   receive lab or pathology results from your procedure in your [facility]   account before your physician is able to contact you. Your physician or   their representative will relay the results to you with their   recommendations at their soonest availability.  Thank you,  PROVATION

## 2023-10-30 NOTE — H&P
EGD and Colonoscopy History and Physical    Patient Name: Arcadio Scott  MRN: [id number]  : 1960  Date of Procedure:  10/30/2023  Referring Physician: Lelia Zepeda FNP  Primary Physician: Haseeb Dunham FNP  Procedure Physician: Ngoc Fenton MD, MPH     Procedure - EGD and Colonoscopy  ASA - per anesthesia  Mallampati - per anesthesia  History of Anesthesia problems - no  Family history Anesthesia problems -  no   Plan of anesthesia - General    Diagnosis: nausea, vomiting, history of colon polyps  Chief Complaint: Same as above    HPI: Patient is an 62 y.o. male is here for the above.     Mr. Scott is a 62 year old AAM with GERD, history of adenomatous colon polyps here for an EGD and colonoscopy.     He reports intermittent nausea with subsequent vomiting for the past year, occurring approximately 2-3 times per month.  He is unable to identify specific triggers and reports he will have onset of nausea and vomiting with or without eating.  The vomiting can last for several hours before stopping.  He denies taking anything for symptomatic relief.  He denies bloody or bilious emesis.  He has occasional acid reflux and takes famotidine 40 mg daily which he has been out of for the past 1-2 months.  He denies hematemesis, odynophagia, dysphagia, belching, bloating, early satiety, or abdominal pain.  Bowel habits are described as formed stools every 1-2 days without melena, hematochezia.       Abdominal ultrasound 2022 revealed hepatomegaly and diffuse hepatic steatosis, gallbladder sludge without evidence of cholecystitis.     He underwent colonoscopy 2022 revealed seven 3-10 mm polyps in the sigmoid colon, descending colon, proximal descending colon and hepatic flexure removed with cold snare, resected and retrieved.  Repeat colonoscopy was recommended in 1 year.  Pathology revealed the following:     Final Diagnosis (Verified)  1.  Hepatic flexure polyp, Polypectomy:  - Tubular  adenoma.  - No evidence of malignancy.     2.  Proximal descending colon polyp x3, Polypectomy:  - Tubular adenoma x 3.  - No evidence of malignancy.      3.  Sigmoid colon polyp x 3, Polypectomy:  - Tubular adenoma x 1.  - Hyperplastic polyp x 2.  - No evidence of malignancy.     He denies ever having an EGD done. He denies regular NSAID use or use of blood thinners. He denies tobacco use and previously smoked cigars. He drinks a few beers a week. He denies a family history of IBD, colon polyps, or colon cancer. His brother has a history of stomach cancer.    ROS:  Constitutional: No fevers, chills, No weight loss  CV: No chest pain  Pulm: No cough, No shortness of breath  GI: see HPI    Medical History:   Past Medical History:   Diagnosis Date    GERD (gastroesophageal reflux disease)     Neuropathy          Surgical History:   Past Surgical History:   Procedure Laterality Date    COLONOSCOPY W/ POLYPECTOMY  01/28/2022    CYST REMOVAL      Facial    SURGICAL REMOVAL OF LESION OF FACE Bilateral 07/18/2022    Procedure: EXCISION, LESION, FACE AND NECK;  Surgeon: Marcelo Capps MD;  Location: [facility];  Service: ENT;  Laterality: Bilateral;       Family History:   Family History   Problem Relation Age of Onset    Diabetes Mother     Heart disease Mother     Cancer Father         Unsure of source    Aneurysm Sister     Diabetes Brother     Stomach cancer Brother     Alzheimer's disease Maternal Grandmother    .    Social History:   Social History     Socioeconomic History    Marital status: Single    Number of children: 0   Tobacco Use    Smoking status: Former     Types: Cigars, Cigarettes     Passive exposure: Current    Smokeless tobacco: Never    Tobacco comments:     1-2 times a week   Substance and Sexual Activity    Alcohol use: Yes     Alcohol/week: 1.0 standard drink of alcohol     Types: 1 Cans of beer per week     Comment: occasionally    Drug use: Never     Social Determinants of Health     Financial  Resource Strain: Low Risk  (2023)    Overall Financial Resource Strain (CARDIA)     Difficulty of Paying Living Expenses: Not hard at all   Food Insecurity: No Food Insecurity (2023)    Hunger Vital Sign     Worried About Running Out of Food in the Last Year: Never true     Ran Out of Food in the Last Year: Never true   Transportation Needs: No Transportation Needs (2023)    PRAPARE - Transportation     Lack of Transportation (Medical): No     Lack of Transportation (Non-Medical): No   Physical Activity: Inactive (2023)    Exercise Vital Sign     Days of Exercise per Week: 0 days     Minutes of Exercise per Session: 0 min   Stress: No Stress Concern Present (2023)    [place] [employer] - Occupational Stress Questionnaire     Feeling of Stress : Not at all   Social Connections: Socially Isolated (2023)    Social Connection and Isolation Panel [NHANES]     Frequency of Communication with Friends and Family: Once a week     Frequency of Social Gatherings with Friends and Family: Once a week     Attends Muslim Services: Never     Active Member of Clubs or Organizations: No     Attends Club or Organization Meetings: Never     Marital Status: Never    Housing Stability: Low Risk  (2023)    Housing Stability Vital Sign     Unable to Pay for Housing in the Last Year: No     Number of Places Lived in the Last Year: 1     Unstable Housing in the Last Year: No       Review of patient's allergies indicates:  No Known Allergies    Medications:   Medications Prior to Admission   Medication Sig Dispense Refill Last Dose    amLODIPine (NORVASC) 10 MG tablet Take 1 tablet (10 mg total) by mouth once daily. 30 tablet 5 10/30/2023    pantoprazole (PROTONIX) 40 MG tablet Take 1 tablet (40 mg total) by mouth once daily. 30 tablet 11 10/29/2023    [] potassium chloride SA (K-DUR,KLOR-CON) 20 MEQ tablet Take 1 tablet (20 mEq total) by mouth once daily. 30 tablet 0  "Taking    pregabalin (LYRICA) 50 MG capsule Take 1 capsule (50 mg total) by mouth 2 (two) times daily. 60 capsule 3 10/29/2023    tamsulosin (FLOMAX) 0.4 mg Cap Take 1 capsule (0.4 mg total) by mouth once daily. 90 capsule 3 10/29/2023         Physical Exam:    Vital Signs:   Vitals:    10/30/23 0753   BP: 134/79   Pulse: 70   Resp: 18   Temp: 97.9 °F (36.6 °C)     /79 (BP Location: Right arm)   Pulse 70   Temp 97.9 °F (36.6 °C)   Resp 18   Ht 5' 9" (1.753 m)   Wt 94.3 kg (208 lb)   SpO2 100%   BMI 30.72 kg/m²     General:          Well appearing in no acute distress  Lungs: Clear to auscultation bilaterally, respirations unlabored  Heart: Regular rate and rhythm, S1 and S2 normal, no obvious murmurs  Abdomen:         Soft, non-tender, bowel sounds normal, no masses, no organomegaly        Labs:  Lab Results   Component Value Date    WBC 6.65 10/09/2023    HGB 12.5 (L) 10/09/2023    HCT 37.0 (L) 10/09/2023    MCV 90.7 10/09/2023     10/09/2023     No results found for: "INR", "PT", "APTT"  Lab Results   Component Value Date     10/09/2023    K 3.7 10/09/2023    CO2 22 (L) 10/09/2023    BUN 7.1 (L) 10/09/2023    CREATININE 0.86 10/09/2023    LABPROT 7.4 10/09/2023    ALBUMIN 3.7 10/09/2023    BILITOT 0.3 10/09/2023    ALKPHOS 56 10/09/2023    ALT 23 10/09/2023    AST 24 10/09/2023       Assessment and Plan:     History reviewed, vital signs satisfactory, cardiopulmonary status satisfactory.  I have explained the sedation options, risks, benefits, and alternatives of this endoscopic procedure to the patient including but not limited to bleeding, inflammation, infection, perforation, and death.  All questions were answered and the patient consented to proceed with procedure as planned.   The patient is deemed an appropriate candidate for the sedation as planned.      Ngoc Fenton MD, MPH   of Clinical Medicine  Gastroenterology and Hepatology  [facility]    10/30/2023  8:40 AM      0 = swallows foods/liquids without difficulty

## 2023-10-30 NOTE — [employer] PATIENT INSTRUCTIONS
Discharge Summary/Instructions after an Endoscopic Procedure  Patient Name: Arcadio Scott  Patient MRN: [id number]  Patient YOB: 1960  Monday, October 30, 2023  Ngoc Fenton MD  Dear patient,  As a result of recent federal legislation (The Federal Cures Act), you may   receive lab or pathology results from your procedure in your [facility]   account before your physician is able to contact you. Your physician or   their representative will relay the results to you with their   recommendations at their soonest availability.  Thank you,  RESTRICTIONS:  During your procedure today, you received medications for sedation.  These   medications may affect your judgment, balance and coordination.  Therefore,   for 24 hours, you have the following restrictions:   - DO NOT drive a car, operate machinery, make legal/financial decisions,   sign important papers or drink alcohol.    ACTIVITY:  Today: no heavy lifting, straining or running due to procedural   sedation/anesthesia.  The following day: return to full activity including work.  DIET:  Eat and drink normally unless instructed otherwise.     TREATMENT FOR COMMON SIDE EFFECTS:  - Mild abdominal pain, nausea, belching, bloating or excessive gas:  rest,   eat lightly and use a heating pad.  - Sore Throat: treat with throat lozenges and/or gargle with warm salt   water.  - Because air was used during the procedure, expelling large amounts of air   from your rectum or belching is normal.  - If a bowel prep was taken, you may not have a bowel movement for 1-3 days.    This is normal.  SYMPTOMS TO WATCH FOR AND REPORT TO YOUR PHYSICIAN:  1. Abdominal pain or bloating, other than gas cramps.  2. Chest pain.  3. Back pain.  4. Signs of infection such as: chills or fever occurring within 24 hours   after the procedure.  5. Rectal bleeding, which would show as bright red, maroon, or black stools.   (A tablespoon of blood from the rectum is not serious, especially  if   hemorrhoids are present.)  6. Vomiting.  7. Weakness or dizziness.  GO DIRECTLY TO THE NEAREST EMERGENCY ROOM IF YOU HAVE ANY OF THE FOLLOWING:      Difficulty breathing              Chills and/or fever over 101 F   Persistent vomiting and/or vomiting blood   Severe abdominal pain   Severe chest pain   Black, tarry stools   Bleeding- more than one tablespoon   Any other symptom or condition that you feel may need urgent attention  Your doctor recommends these additional instructions:  If any biopsies were taken, your doctors clinic will contact you in 1 to 2   weeks with any results.  - Patient has a contact number available for emergencies.  The signs and   symptoms of potential delayed complications were discussed with the   patient.  Return to normal activities tomorrow.  Written discharge   instructions were provided to the patient.   - Discharge patient to home.   - Resume previous diet.   - Continue present medications, pantoprazole 40 mg daily.   - Await pathology results.  For questions, problems or results please call your physician - Ngoc Fenton MD at Work:  [phone], Work:  [phone].  [facility] , EMERGENCY ROOM PHONE NUMBER: [phone]  IF A COMPLICATION OR EMERGENCY SITUATION ARISES AND YOU ARE UNABLE TO REACH   YOUR PHYSICIAN - GO DIRECTLY TO THE EMERGENCY ROOM.  Ngoc Fenton MD  10/30/2023 10:22:19 AM  This report has been verified and signed electronically.  Dear patient,  As a result of recent federal legislation (The Federal Cures Act), you may   receive lab or pathology results from your procedure in your [facility]   account before your physician is able to contact you. Your physician or   their representative will relay the results to you with their   recommendations at their soonest availability.  Thank you,  [employer]

## 2023-10-31 VITALS
WEIGHT: 208 LBS | RESPIRATION RATE: 20 BRPM | OXYGEN SATURATION: 99 % | BODY MASS INDEX: 30.81 KG/M2 | HEART RATE: 82 BPM | HEIGHT: 69 IN | SYSTOLIC BLOOD PRESSURE: 131 MMHG | DIASTOLIC BLOOD PRESSURE: 86 MMHG | TEMPERATURE: 98 F

## 2023-11-01 DIAGNOSIS — R11.2 NAUSEA AND VOMITING, UNSPECIFIED VOMITING TYPE: ICD-10-CM

## 2023-11-01 DIAGNOSIS — K21.9 GASTROESOPHAGEAL REFLUX DISEASE, UNSPECIFIED WHETHER ESOPHAGITIS PRESENT: ICD-10-CM

## 2023-11-01 RX ORDER — PANTOPRAZOLE SODIUM 40 MG/1
40 TABLET, DELAYED RELEASE ORAL DAILY
Qty: 30 TABLET | Refills: 11 | Status: SHIPPED | OUTPATIENT
Start: 2023-11-01 | End: 2024-10-31

## 2023-11-01 RX ORDER — BISMUTH SUBCITRATE POTASSIUM, METRONIDAZOLE AND TETRACYCLINE HYDROCHLORIDE 140; 125; 125 MG/1; MG/1; MG/1
3 CAPSULE ORAL
Qty: 120 CAPSULE | Refills: 0 | Status: SHIPPED | OUTPATIENT
Start: 2023-11-01 | End: 2023-12-06 | Stop reason: SDUPTHER

## 2023-11-01 NOTE — PROGRESS NOTES
Please let patient know that stomach biopsies showed inflammation and H pylori bacteria (which is a bacteria that can cause inflammation and ulcers in the stomach).    I sent in a medication to treat this to the [facility] pharmacy called Pylera    Please take this medication along with pantoprazole to treat this infection.    Will need a stool test in 4 weeks to confirm that bacteria is gone.       Also multiple adenomatous polyps removed.  All benign.  He will need repeat colonoscopy in 1 year.

## 2023-11-02 DIAGNOSIS — A04.8 H. PYLORI INFECTION: ICD-10-CM

## 2023-11-02 DIAGNOSIS — Z86.010 PERSONAL HISTORY OF COLONIC POLYPS: Primary | ICD-10-CM

## 2023-11-02 NOTE — TELEPHONE ENCOUNTER
Results to pt. Verbalized understanding. Colonoscopy sched 10/30/24. Will mail prep instructions. Pt verbalized understanding.         Message from Ngoc Fenton MD sent at 11/1/2023  3:49 PM CDT -----  Please let patient know that stomach biopsies showed inflammation and H pylori bacteria (which is a bacteria that can cause inflammation and ulcers in the stomach).    I sent in a medication to treat this to the [facility] pharmacy called Pylera    Please take this medication along with pantoprazole to treat this infection.    Will need a stool test in 4 weeks to confirm that bacteria is gone.       Also multiple adenomatous polyps removed.  All benign.  He will need repeat colonoscopy in 1 year.

## 2023-11-09 ENCOUNTER — TELEPHONE (OUTPATIENT)
Dept: NEUROLOGY | Facility: CLINIC | Age: 63
End: 2023-11-09
Payer: MEDICAID

## 2023-11-09 NOTE — TELEPHONE ENCOUNTER
Attempted to contact patient. No answer, lvm. Would like to ensure he is taking current medications as prescribed, (Lyrica BID).

## 2023-11-09 NOTE — TELEPHONE ENCOUNTER
----- Message from Parisa Kimball sent at 11/9/2023  8:23 AM CST -----  Regarding: Pt wants something for back pain  Pt called and stated he was referred to Neurosurgery by us and will see them next week. Pt is asking if he can get something for pain till he sees his new provider. Pt can be reached @ [phone]            Thank You  Suzanne LEAL

## 2023-11-14 PROBLEM — M54.50 CHRONIC BILATERAL LOW BACK PAIN WITHOUT SCIATICA: Status: ACTIVE | Noted: 2022-08-01

## 2023-11-15 RX ORDER — POLYETHYLENE GLYCOL 3350, SODIUM SULFATE, SODIUM CHLORIDE, POTASSIUM CHLORIDE, SODIUM ASCORBATE, AND ASCORBIC ACID 7.5-2.691G
2000 KIT ORAL SEE ADMIN INSTRUCTIONS
Qty: 1 KIT | Refills: 0 | Status: SHIPPED | OUTPATIENT
Start: 2023-11-15

## 2023-12-06 ENCOUNTER — OFFICE VISIT (OUTPATIENT)
Dept: INTERNAL MEDICINE | Facility: CLINIC | Age: 63
End: 2023-12-06
Payer: MEDICAID

## 2023-12-06 VITALS
RESPIRATION RATE: 16 BRPM | TEMPERATURE: 99 F | DIASTOLIC BLOOD PRESSURE: 72 MMHG | SYSTOLIC BLOOD PRESSURE: 134 MMHG | HEIGHT: 69 IN | HEART RATE: 70 BPM | WEIGHT: 212 LBS | BODY MASS INDEX: 31.4 KG/M2

## 2023-12-06 DIAGNOSIS — G60.8 PERIPHERAL SENSORY-MOTOR AXONAL POLYNEUROPATHY: ICD-10-CM

## 2023-12-06 DIAGNOSIS — Z00.00 WELLNESS EXAMINATION: Primary | ICD-10-CM

## 2023-12-06 DIAGNOSIS — A04.8 H. PYLORI INFECTION: ICD-10-CM

## 2023-12-06 DIAGNOSIS — D64.9 ANEMIA, UNSPECIFIED TYPE: ICD-10-CM

## 2023-12-06 DIAGNOSIS — I10 HYPERTENSION, UNSPECIFIED TYPE: ICD-10-CM

## 2023-12-06 PROCEDURE — 1159F PR MEDICATION LIST DOCUMENTED IN MEDICAL RECORD: ICD-10-PCS | Mod: CPTII,,, | Performed by: NURSE PRACTITIONER

## 2023-12-06 PROCEDURE — 3078F PR MOST RECENT DIASTOLIC BLOOD PRESSURE < 80 MM HG: ICD-10-PCS | Mod: CPTII,,, | Performed by: NURSE PRACTITIONER

## 2023-12-06 PROCEDURE — 3075F PR MOST RECENT SYSTOLIC BLOOD PRESS GE 130-139MM HG: ICD-10-PCS | Mod: CPTII,,, | Performed by: NURSE PRACTITIONER

## 2023-12-06 PROCEDURE — 99213 OFFICE O/P EST LOW 20 MIN: CPT | Mod: PBBFAC | Performed by: NURSE PRACTITIONER

## 2023-12-06 PROCEDURE — 1160F RVW MEDS BY RX/DR IN RCRD: CPT | Mod: CPTII,,, | Performed by: NURSE PRACTITIONER

## 2023-12-06 PROCEDURE — 3008F BODY MASS INDEX DOCD: CPT | Mod: CPTII,,, | Performed by: NURSE PRACTITIONER

## 2023-12-06 PROCEDURE — 3075F SYST BP GE 130 - 139MM HG: CPT | Mod: CPTII,,, | Performed by: NURSE PRACTITIONER

## 2023-12-06 PROCEDURE — 3008F PR BODY MASS INDEX (BMI) DOCUMENTED: ICD-10-PCS | Mod: CPTII,,, | Performed by: NURSE PRACTITIONER

## 2023-12-06 PROCEDURE — 3044F PR MOST RECENT HEMOGLOBIN A1C LEVEL <7.0%: ICD-10-PCS | Mod: CPTII,,, | Performed by: NURSE PRACTITIONER

## 2023-12-06 PROCEDURE — 99214 PR OFFICE/OUTPT VISIT, EST, LEVL IV, 30-39 MIN: ICD-10-PCS | Mod: S$PBB,,, | Performed by: NURSE PRACTITIONER

## 2023-12-06 PROCEDURE — 1159F MED LIST DOCD IN RCRD: CPT | Mod: CPTII,,, | Performed by: NURSE PRACTITIONER

## 2023-12-06 PROCEDURE — 3044F HG A1C LEVEL LT 7.0%: CPT | Mod: CPTII,,, | Performed by: NURSE PRACTITIONER

## 2023-12-06 PROCEDURE — 1160F PR REVIEW ALL MEDS BY PRESCRIBER/CLIN PHARMACIST DOCUMENTED: ICD-10-PCS | Mod: CPTII,,, | Performed by: NURSE PRACTITIONER

## 2023-12-06 PROCEDURE — 3078F DIAST BP <80 MM HG: CPT | Mod: CPTII,,, | Performed by: NURSE PRACTITIONER

## 2023-12-06 PROCEDURE — 99214 OFFICE O/P EST MOD 30 MIN: CPT | Mod: S$PBB,,, | Performed by: NURSE PRACTITIONER

## 2023-12-06 RX ORDER — BISMUTH SUBCITRATE POTASSIUM, METRONIDAZOLE AND TETRACYCLINE HYDROCHLORIDE 140; 125; 125 MG/1; MG/1; MG/1
3 CAPSULE ORAL
Qty: 120 CAPSULE | Refills: 0 | Status: SHIPPED | OUTPATIENT
Start: 2023-12-06 | End: 2023-12-16

## 2023-12-06 RX ORDER — AMLODIPINE BESYLATE 10 MG/1
10 TABLET ORAL DAILY
Qty: 90 TABLET | Refills: 1 | Status: SHIPPED | OUTPATIENT
Start: 2023-12-06 | End: 2024-06-03

## 2023-12-06 NOTE — PATIENT INSTRUCTIONS
Sotero Ervin,     If you are due for any health screening(s) below please notify me so we can arrange them to be ordered and scheduled. Most healthy patients at your age complete them, but you are free to accept or refuse.     If you can't do it, I'll definitely understand. If you can, I'd certainly appreciate it!    All of your core healthy metrics are met.

## 2023-12-06 NOTE — ASSESSMENT & PLAN NOTE
Overall, improved  Normocytic  Colonoscopy 1/28/2022 that revealed mx polyps (7 removed). No evidence of malignancy.   Repeat colonoscopy 10/2023 mx polyps--nonmalignant. Also with H. Pylori on EGD. Has not obtained Pylera. Re-sent Rx. Instructed to obtain ASAP and start. Do not drink ETOH with this medication  Labs prior to f/u to monitor anemia
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
## 2023-12-08 ENCOUNTER — TELEPHONE (OUTPATIENT)
Dept: GASTROENTEROLOGY | Facility: CLINIC | Age: 63
End: 2023-12-08
Payer: MEDICAID

## 2023-12-08 NOTE — TELEPHONE ENCOUNTER
----- Message from Marlene Mark sent at 11/15/2023 11:25 AM CST -----  Regarding: Fecal antigen  Due for H Pylori fecal antigen

## 2023-12-08 NOTE — TELEPHONE ENCOUNTER
Spoke with pt. He started the Pylera & Pantoprazole 2 days ago. I will remind him in 1 month to complete stool study.

## 2024-01-01 PROBLEM — N17.9 AKI (ACUTE KIDNEY INJURY): Status: RESOLVED | Noted: 2023-09-27 | Resolved: 2024-01-01

## 2024-01-08 ENCOUNTER — TELEPHONE (OUTPATIENT)
Dept: GASTROENTEROLOGY | Facility: CLINIC | Age: 64
End: 2024-01-08
Payer: MEDICAID

## 2024-01-08 NOTE — TELEPHONE ENCOUNTER
Reminder to pt re: stool study. Verbalized understanding. He plans to  a stool kit on 1/18/24 when he comes for another appt. Transportation is an issue.

## 2024-01-18 ENCOUNTER — OFFICE VISIT (OUTPATIENT)
Dept: UROLOGY | Facility: CLINIC | Age: 64
End: 2024-01-18
Payer: MEDICAID

## 2024-01-18 VITALS
BODY MASS INDEX: 31.96 KG/M2 | SYSTOLIC BLOOD PRESSURE: 116 MMHG | DIASTOLIC BLOOD PRESSURE: 77 MMHG | HEART RATE: 73 BPM | WEIGHT: 215.81 LBS | HEIGHT: 69 IN | RESPIRATION RATE: 20 BRPM | OXYGEN SATURATION: 100 % | TEMPERATURE: 98 F

## 2024-01-18 DIAGNOSIS — N32.81 OAB (OVERACTIVE BLADDER): ICD-10-CM

## 2024-01-18 DIAGNOSIS — R31.9 HEMATURIA, UNSPECIFIED TYPE: ICD-10-CM

## 2024-01-18 DIAGNOSIS — N40.0 BENIGN PROSTATIC HYPERPLASIA, UNSPECIFIED WHETHER LOWER URINARY TRACT SYMPTOMS PRESENT: Primary | ICD-10-CM

## 2024-01-18 DIAGNOSIS — R35.1 NOCTURIA: ICD-10-CM

## 2024-01-18 LAB
BILIRUB SERPL-MCNC: NEGATIVE MG/DL
BLOOD URINE, POC: NEGATIVE
COLOR, POC UA: YELLOW
GLUCOSE UR QL STRIP: NEGATIVE
KETONES UR QL STRIP: NEGATIVE
LEUKOCYTE ESTERASE URINE, POC: NEGATIVE
NITRITE, POC UA: NEGATIVE
PH, POC UA: 6
POC RESIDUAL URINE VOLUME: 0 ML (ref 0–100)
PROTEIN, POC: NEGATIVE
SPECIFIC GRAVITY, POC UA: 1.02
UROBILINOGEN, POC UA: 0.2

## 2024-01-18 PROCEDURE — 99214 OFFICE O/P EST MOD 30 MIN: CPT | Mod: PBBFAC | Performed by: NURSE PRACTITIONER

## 2024-01-18 PROCEDURE — 3074F SYST BP LT 130 MM HG: CPT | Mod: CPTII,,, | Performed by: NURSE PRACTITIONER

## 2024-01-18 PROCEDURE — 1159F MED LIST DOCD IN RCRD: CPT | Mod: CPTII,,, | Performed by: NURSE PRACTITIONER

## 2024-01-18 PROCEDURE — 81001 URINALYSIS AUTO W/SCOPE: CPT | Mod: PBBFAC | Performed by: NURSE PRACTITIONER

## 2024-01-18 PROCEDURE — 51798 US URINE CAPACITY MEASURE: CPT | Mod: PBBFAC | Performed by: NURSE PRACTITIONER

## 2024-01-18 PROCEDURE — 99213 OFFICE O/P EST LOW 20 MIN: CPT | Mod: S$PBB,,, | Performed by: NURSE PRACTITIONER

## 2024-01-18 PROCEDURE — 3008F BODY MASS INDEX DOCD: CPT | Mod: CPTII,,, | Performed by: NURSE PRACTITIONER

## 2024-01-18 PROCEDURE — 3078F DIAST BP <80 MM HG: CPT | Mod: CPTII,,, | Performed by: NURSE PRACTITIONER

## 2024-01-18 PROCEDURE — 1160F RVW MEDS BY RX/DR IN RCRD: CPT | Mod: CPTII,,, | Performed by: NURSE PRACTITIONER

## 2024-01-18 RX ORDER — MIRABEGRON 25 MG/1
25 TABLET, FILM COATED, EXTENDED RELEASE ORAL DAILY
Qty: 30 TABLET | Refills: 11 | Status: SHIPPED | OUTPATIENT
Start: 2024-01-18 | End: 2024-01-30 | Stop reason: SDUPTHER

## 2024-01-18 NOTE — PROGRESS NOTES
Placed in room. Bladder scan done and tolerated well. 0 ml urine residual noted. Seen by EIMLY Rodriguez. D/C instructions given and understood. RTC 1 month

## 2024-01-18 NOTE — PROGRESS NOTES
Chief Complaint:   Chief Complaint   Patient presents with    Follow-up       HPI: Patient is a 62-year-old male three-month follow-up for microscopic hematuria.  Patient treated by PCP with a random UA noted to have trace RBCs and a urine sample.   Patient seen by Dr. Rhodes for cystoscope was 07/13/2023 which was negative plan was to follow-up with a urine cytology in 6 months.  PSA 0.88 on 10/09/2023. Today patient presents with urinary urgency, intermittent urinary incontinence due to urgency, urinary frequency of 7-10 times during the day and 4-5 times at night/nocturia.  Patient denies any dysuria, urinary retention, gross hematuria.  IPSS 28/4. Bladder scan 0 mL.  Plan is to continue Flomax 0.4 mg p.o. daily and start mirabegron 25 mg p.o. daily RTC one-month for re-evaluation.  Instructed patient to also follow urologic behavior modification.    Allergies:  Review of patient's allergies indicates:  No Known Allergies    Medications:  Current Outpatient Medications   Medication Sig Dispense Refill    amLODIPine (NORVASC) 10 MG tablet Take 1 tablet (10 mg total) by mouth once daily. 90 tablet 1    pantoprazole (PROTONIX) 40 MG tablet Take 1 tablet (40 mg total) by mouth once daily. 30 tablet 11    polyethylene glycol (MOVIPREP) 100-7.5-2.691 gram solution Take 2,000 mLs by mouth As instructed (Take as directed per instructions provided by clinic). Take as directed prior to colonoscopy 1 kit 0    pregabalin (LYRICA) 50 MG capsule Take 1 capsule (50 mg total) by mouth 2 (two) times daily. 60 capsule 3    tamsulosin (FLOMAX) 0.4 mg Cap Take 1 capsule (0.4 mg total) by mouth once daily. 90 capsule 3     No current facility-administered medications for this visit.     Facility-Administered Medications Ordered in Other Visits   Medication Dose Route Frequency Provider Last Rate Last Admin    lactated ringers infusion   Intravenous Continuous Deanna Gaona MD 10 mL/hr at 10/30/23 0808 New Bag at 10/30/23 0808     LIDOcaine (PF) 10 mg/ml (1%) injection 10 mg  1 mL Intradermal Once Marina De La Torre, AN           Review of Systems:  General: No fever, chills, fatigability, or weight loss.  Skin: No rashes, itching, or changes in color or texture of skin.  Chest: Denies GRANT, cyanosis, wheezing, cough, and sputum production.  Abdomen: Appetite fine. No weight loss. Denies diarrhea, abdominal pain, hematemesis, or blood in stool.  Musculoskeletal: No joint stiffness or swelling. Denies back pain.  : As above.  All other review of systems negative.    PMH:  Past Medical History:   Diagnosis Date    GERD (gastroesophageal reflux disease)     Neuropathy        PSH:  Past Surgical History:   Procedure Laterality Date    COLONOSCOPY W/ POLYPECTOMY  01/28/2022    COLONOSCOPY, WITH POLYPECTOMY USING SNARE N/A 10/30/2023    Procedure: COLONOSCOPY, WITH POLYPECTOMY USING SNARE;  Surgeon: Ngoc Fenton MD;  Location: [facility] ENDOSCOPY;  Service: Gastroenterology;  Laterality: N/A;    CYST REMOVAL      Facial    EGD, WITH CLOSED BIOPSY N/A 10/30/2023    Procedure: EGD;  Surgeon: Ngoc Fenton MD;  Location: [facility] ENDOSCOPY;  Service: Gastroenterology;  Laterality: N/A;    SURGICAL REMOVAL OF LESION OF FACE Bilateral 07/18/2022    Procedure: EXCISION, LESION, FACE AND NECK;  Surgeon: Marcelo Capps MD;  Location: [facility] OR;  Service: ENT;  Laterality: Bilateral;       FamHx:  Family History   Problem Relation Age of Onset    Diabetes Mother     Heart disease Mother     Cancer Father         Unsure of source    Aneurysm Sister     Diabetes Brother     Stomach cancer Brother     Alzheimer's disease Maternal Grandmother        SocHx:  Social History     Socioeconomic History    Marital status: Single    Number of children: 0   Tobacco Use    Smoking status: Former     Types: Cigars, Cigarettes     Passive exposure: Current    Smokeless tobacco: Never    Tobacco comments:     1-2 times a week   Substance and Sexual  Activity    Alcohol use: Yes     Alcohol/week: 1.0 standard drink of alcohol     Types: 1 Cans of beer per week     Comment: occasionally    Drug use: Never    Sexual activity: Not Currently     Social Determinants of Health     Financial Resource Strain: Low Risk  (9/28/2023)    Overall Financial Resource Strain (CARDIA)     Difficulty of Paying Living Expenses: Not hard at all   Food Insecurity: No Food Insecurity (9/28/2023)    Hunger Vital Sign     Worried About Running Out of Food in the Last Year: Never true     Ran Out of Food in the Last Year: Never true   Transportation Needs: No Transportation Needs (9/28/2023)    PRAPARE - Transportation     Lack of Transportation (Medical): No     Lack of Transportation (Non-Medical): No   Physical Activity: Inactive (9/28/2023)    Exercise Vital Sign     Days of Exercise per Week: 0 days     Minutes of Exercise per Session: 0 min   Stress: No Stress Concern Present (9/28/2023)    [place] [employer] - Occupational Stress Questionnaire     Feeling of Stress : Not at all   Social Connections: Socially Isolated (9/28/2023)    Social Connection and Isolation Panel [NHANES]     Frequency of Communication with Friends and Family: Once a week     Frequency of Social Gatherings with Friends and Family: Once a week     Attends Restoration Services: Never     Active Member of Clubs or Organizations: No     Attends Club or Organization Meetings: Never     Marital Status: Never    Housing Stability: Low Risk  (9/28/2023)    Housing Stability Vital Sign     Unable to Pay for Housing in the Last Year: No     Number of Places Lived in the Last Year: 1     Unstable Housing in the Last Year: No       Physical Exam:  There were no vitals filed for this visit.  General: A&Ox3, no apparent distress, no deformities  Neck: No masses, normal thyroid  Lungs: CTA kayleigh, no use of accessory muscles  Heart: RRR, no arrhythmias  Abdomen: Soft, NT, ND, no masses, no hernias,  no hepatosplenomegaly  Lymphatic: Neck and groin nodes negative  Skin: The skin is warm and dry. No jaundice.  Ext: No c/c/e.    GUMale: Test desc kayleigh, no abnormalities of epididymus. Penis uncircumcised, with normal penile and scrotal skin. Meatus normal. Normal rectal tone, no hemorrhoids. Prostate:  2-1/2 finger breadth wide, half fingerbreadths thick, smooth, soft, nontender, no nodules or masses appreciated. SV not palpable. Perineum and anus normal.      Urinalysis:  Color, UA Yellow   Spec Grav UA 1.020   pH, UA 6.0   WBC, UA Negative   Nitrite, UA Negative   Protein, POC Negative   Glucose, UA Negative   Ketones, UA Negative   Urobilinogen, UA 0.2   Bilirubin, POC Negative   Blood, UA Negative   Microscopic urinalysis negative for RBCs, WBCs, nitrites.        Impression:  1. Hematuria, unspecified type  - POCT URINE DIPSTICK WITH MICROSCOPE, AUTOMATED    2. Benign prostatic hyperplasia, unspecified whether lower urinary tract symptoms present  - POCT URINE DIPSTICK WITH MICROSCOPE, AUTOMATED      Plan: Instructed patient continue tamsulosin 0.4 mg p.o. daily, start mirabegron 25 mg p.o. daily, RTC 1 months with cytology.  Reinforced patient teaching on timed voiding every 2-3 hrs, double voiding, avoidance of bladder irritants such as alcohol, citrus foods, chocolate, caffeinated drinks, energy drinks, spicy foods, sugar, caffeine products, sodas. Instructed patient to avoid drinking fluids 1-2 hours prior to bedtime & void immediately before bedtime.

## 2024-01-22 ENCOUNTER — TELEPHONE (OUTPATIENT)
Dept: GASTROENTEROLOGY | Facility: CLINIC | Age: 64
End: 2024-01-22
Payer: MEDICAID

## 2024-01-22 NOTE — TELEPHONE ENCOUNTER
"Reminder to pt re: stool study. Verbalized understanding. States, " I know. I picked up the kit on Thursday. I don't drive, so I will bring it in when my fiance' comes."  "

## 2024-01-26 ENCOUNTER — LAB VISIT (OUTPATIENT)
Dept: LAB | Facility: HOSPITAL | Age: 64
End: 2024-01-26
Attending: NURSE PRACTITIONER
Payer: MEDICAID

## 2024-01-26 DIAGNOSIS — A04.8 H. PYLORI INFECTION: ICD-10-CM

## 2024-01-26 LAB — H. PYLORI STOOL: NEGATIVE

## 2024-01-26 PROCEDURE — 87338 HPYLORI STOOL AG IA: CPT

## 2024-01-29 ENCOUNTER — TELEPHONE (OUTPATIENT)
Dept: GASTROENTEROLOGY | Facility: CLINIC | Age: 64
End: 2024-01-29
Payer: MEDICAID

## 2024-01-29 DIAGNOSIS — A04.8 H. PYLORI INFECTION: Primary | ICD-10-CM

## 2024-01-29 NOTE — TELEPHONE ENCOUNTER
"Results to pt. Pt indicates he did not stop his Protonix prior to collecting stool sample. States, " I don't know. Is that the pink pill? I kept taking my ulcer medicine. Is that the one?" We discussed that he needs to stop the Protonix 14 d prior to collecting stool sample. Verbalized understanding.  Would you like him to repeat the H Pylori fecal antigen?  "

## 2024-01-29 NOTE — TELEPHONE ENCOUNTER
----- Message from AN Montoya sent at 1/26/2024 10:18 AM CST -----  Please notify patient that the stool test for H. Pylori is negative.

## 2024-01-30 DIAGNOSIS — N40.0 BENIGN PROSTATIC HYPERPLASIA, UNSPECIFIED WHETHER LOWER URINARY TRACT SYMPTOMS PRESENT: ICD-10-CM

## 2024-01-30 RX ORDER — MIRABEGRON 25 MG/1
25 TABLET, FILM COATED, EXTENDED RELEASE ORAL DAILY
Qty: 30 TABLET | Refills: 11 | Status: SHIPPED | OUTPATIENT
Start: 2024-01-30 | End: 2024-05-20 | Stop reason: SDUPTHER

## 2024-02-08 DIAGNOSIS — G60.8 PERIPHERAL SENSORY-MOTOR AXONAL POLYNEUROPATHY: ICD-10-CM

## 2024-02-08 RX ORDER — PREGABALIN 50 MG/1
50 CAPSULE ORAL 2 TIMES DAILY
Qty: 60 CAPSULE | Refills: 0 | Status: SHIPPED | OUTPATIENT
Start: 2024-02-08 | End: 2024-02-29 | Stop reason: SDUPTHER

## 2024-02-19 ENCOUNTER — OFFICE VISIT (OUTPATIENT)
Dept: UROLOGY | Facility: CLINIC | Age: 64
End: 2024-02-19
Payer: MEDICAID

## 2024-02-19 VITALS
SYSTOLIC BLOOD PRESSURE: 137 MMHG | OXYGEN SATURATION: 98 % | TEMPERATURE: 98 F | HEIGHT: 69 IN | RESPIRATION RATE: 18 BRPM | DIASTOLIC BLOOD PRESSURE: 82 MMHG | WEIGHT: 216 LBS | BODY MASS INDEX: 31.99 KG/M2 | HEART RATE: 80 BPM

## 2024-02-19 DIAGNOSIS — N40.0 BENIGN PROSTATIC HYPERPLASIA, UNSPECIFIED WHETHER LOWER URINARY TRACT SYMPTOMS PRESENT: Primary | ICD-10-CM

## 2024-02-19 LAB
BILIRUB SERPL-MCNC: NORMAL MG/DL
BLOOD URINE, POC: NORMAL
COLOR, POC UA: YELLOW
GLUCOSE UR QL STRIP: NORMAL
KETONES UR QL STRIP: NORMAL
LEUKOCYTE ESTERASE URINE, POC: NORMAL
NITRITE, POC UA: NORMAL
PH, POC UA: 7
POC RESIDUAL URINE VOLUME: 0 ML (ref 0–100)
PROTEIN, POC: 30
SPECIFIC GRAVITY, POC UA: 1.02
UROBILINOGEN, POC UA: 2

## 2024-02-19 PROCEDURE — 81001 URINALYSIS AUTO W/SCOPE: CPT | Mod: PBBFAC | Performed by: NURSE PRACTITIONER

## 2024-02-19 PROCEDURE — 1159F MED LIST DOCD IN RCRD: CPT | Mod: CPTII,,, | Performed by: NURSE PRACTITIONER

## 2024-02-19 PROCEDURE — 51798 US URINE CAPACITY MEASURE: CPT | Mod: PBBFAC | Performed by: NURSE PRACTITIONER

## 2024-02-19 PROCEDURE — 3008F BODY MASS INDEX DOCD: CPT | Mod: CPTII,,, | Performed by: NURSE PRACTITIONER

## 2024-02-19 PROCEDURE — 99214 OFFICE O/P EST MOD 30 MIN: CPT | Mod: PBBFAC,25 | Performed by: NURSE PRACTITIONER

## 2024-02-19 PROCEDURE — 3075F SYST BP GE 130 - 139MM HG: CPT | Mod: CPTII,,, | Performed by: NURSE PRACTITIONER

## 2024-02-19 PROCEDURE — 99213 OFFICE O/P EST LOW 20 MIN: CPT | Mod: S$PBB,,, | Performed by: NURSE PRACTITIONER

## 2024-02-19 PROCEDURE — 3079F DIAST BP 80-89 MM HG: CPT | Mod: CPTII,,, | Performed by: NURSE PRACTITIONER

## 2024-02-19 NOTE — PROGRESS NOTES
"Chief Complaint:   Chief Complaint   Patient presents with    Follow-up     Patient states," I don't pee as much as I used to."     HPI: Patient is a 62-year-old male three-month follow-up for microscopic hematuria.  Patient treated by PCP with a random UA noted to have trace RBCs and a urine sample.   Patient seen by Dr. Rhodes for cystoscope was 07/13/2023 which was negative plan was to follow-up with a urine cytology in 6 months.  PSA 0.88 on 10/09/2023.  On patient's last visit he presented with urinary urgency, intermittent urinary incontinence due to urgency, urinary frequency of 7-10 times during the day and 4-5 times at night/nocturia.  Patient denies any dysuria, urinary retention, gross hematuria.  IPSS 28/4.  Patient's last visit Bladder scan 0 mL.  Today patient states he is p.m. much less today with his frequency episodes substantially decreased to 3-4 times during the daytime and 0 times at night.  Today patient denies any urinary frequency, urinary urgency, urinary dysuria, urinary incontinence, urinary retention, gross hematuria, nocturia.  Plan is to repeat PSA in 6 months with RTC.  Allergies:  Review of patient's allergies indicates:  No Known Allergies    Medications:  Current Outpatient Medications   Medication Sig Dispense Refill    amLODIPine (NORVASC) 10 MG tablet Take 1 tablet (10 mg total) by mouth once daily. 90 tablet 1    mirabegron (MYRBETRIQ) 25 mg Tb24 ER tablet Take 1 tablet (25 mg total) by mouth once daily. 30 tablet 11    pantoprazole (PROTONIX) 40 MG tablet Take 1 tablet (40 mg total) by mouth once daily. 30 tablet 11    pregabalin (LYRICA) 50 MG capsule TAKE 1 CAPSULE BY MOUTH 2 TIMES DAILY. 60 capsule 0    tamsulosin (FLOMAX) 0.4 mg Cap Take 1 capsule (0.4 mg total) by mouth once daily. 90 capsule 3    polyethylene glycol (MOVIPREP) 100-7.5-2.691 gram solution Take 2,000 mLs by mouth As instructed (Take as directed per instructions provided by clinic). Take as directed prior " to colonoscopy (Patient not taking: Reported on 2/19/2024) 1 kit 0     No current facility-administered medications for this visit.     Facility-Administered Medications Ordered in Other Visits   Medication Dose Route Frequency Provider Last Rate Last Admin    lactated ringers infusion   Intravenous Continuous Deanna Gaona MD 10 mL/hr at 10/30/23 0808 New Bag at 10/30/23 0808    LIDOcaine (PF) 10 mg/ml (1%) injection 10 mg  1 mL Intradermal Once Marina De La Torre, AN           Review of Systems:  General: No fever, chills, fatigability, or weight loss.  Skin: No rashes, itching, or changes in color or texture of skin.  Chest: Denies GRANT, cyanosis, wheezing, cough, and sputum production.  Abdomen: Appetite fine. No weight loss. Denies diarrhea, abdominal pain, hematemesis, or blood in stool.  Musculoskeletal: No joint stiffness or swelling. Denies back pain.  : As above.  All other review of systems negative.    PMH:  Past Medical History:   Diagnosis Date    GERD (gastroesophageal reflux disease)     Neuropathy        PSH:  Past Surgical History:   Procedure Laterality Date    COLONOSCOPY W/ POLYPECTOMY  01/28/2022    COLONOSCOPY, WITH POLYPECTOMY USING SNARE N/A 10/30/2023    Procedure: COLONOSCOPY, WITH POLYPECTOMY USING SNARE;  Surgeon: Ngoc Fenton MD;  Location: [facility] ENDOSCOPY;  Service: Gastroenterology;  Laterality: N/A;    CYST REMOVAL      Facial    EGD, WITH CLOSED BIOPSY N/A 10/30/2023    Procedure: EGD;  Surgeon: Ngoc Fenton MD;  Location: [facility] ENDOSCOPY;  Service: Gastroenterology;  Laterality: N/A;    SURGICAL REMOVAL OF LESION OF FACE Bilateral 07/18/2022    Procedure: EXCISION, LESION, FACE AND NECK;  Surgeon: Marcelo Capps MD;  Location: [facility] OR;  Service: ENT;  Laterality: Bilateral;       FamHx:  Family History   Problem Relation Age of Onset    Diabetes Mother     Heart disease Mother     Cancer Father         Unsure of source    Aneurysm Sister     Diabetes  Brother     Stomach cancer Brother     Alzheimer's disease Maternal Grandmother        SocHx:  Social History     Socioeconomic History    Marital status: Single    Number of children: 0   Tobacco Use    Smoking status: Former     Types: Cigars, Cigarettes     Passive exposure: Current    Smokeless tobacco: Never    Tobacco comments:     1-2 times a week   Substance and Sexual Activity    Alcohol use: Yes     Alcohol/week: 1.0 standard drink of alcohol     Types: 1 Cans of beer per week     Comment: occasionally    Drug use: Never    Sexual activity: Not Currently     Social Determinants of Health     Financial Resource Strain: Low Risk  (9/28/2023)    Overall Financial Resource Strain (CARDIA)     Difficulty of Paying Living Expenses: Not hard at all   Food Insecurity: No Food Insecurity (9/28/2023)    Hunger Vital Sign     Worried About Running Out of Food in the Last Year: Never true     Ran Out of Food in the Last Year: Never true   Transportation Needs: No Transportation Needs (9/28/2023)    PRAPARE - Transportation     Lack of Transportation (Medical): No     Lack of Transportation (Non-Medical): No   Physical Activity: Inactive (9/28/2023)    Exercise Vital Sign     Days of Exercise per Week: 0 days     Minutes of Exercise per Session: 0 min   Stress: No Stress Concern Present (9/28/2023)    [place] [employer] [place] of Occupational Health - Occupational Stress Questionnaire     Feeling of Stress : Not at all   Social Connections: Socially Isolated (9/28/2023)    Social Connection and Isolation Panel [NHANES]     Frequency of Communication with Friends and Family: Once a week     Frequency of Social Gatherings with Friends and Family: Once a week     Attends Jehovah's witness Services: Never     Active Member of Clubs or Organizations: No     Attends Club or Organization Meetings: Never     Marital Status: Never    Housing Stability: Low Risk  (9/28/2023)    Housing Stability Vital Sign     Unable to Pay for Housing  in the Last Year: No     Number of Places Lived in the Last Year: 1     Unstable Housing in the Last Year: No       Physical Exam:  Vitals:    02/19/24 0850   BP: 137/82   Pulse: 80   Resp: 18   Temp: 98.3 °F (36.8 °C)     General: A&Ox3, no apparent distress, no deformities  Neck: No masses, normal thyroid  Lungs: CTA kayleigh, no use of accessory muscles  Heart: RRR, no arrhythmias  Abdomen: Soft, NT, ND, no masses, no hernias, no hepatosplenomegaly  Lymphatic: Neck and groin nodes negative  Skin: The skin is warm and dry. No jaundice.  Ext: No c/c/e.    GUMale:  Deferred DICK examined this time.    Urinalysis:  Results for orders placed or performed in visit on 02/19/24   POCT URINE DIPSTICK WITH MICROSCOPE, AUTOMATED   Result Value Ref Range    Color, UA Yellow     Spec Grav UA 1.025     pH, UA 7.0     WBC, UA neg     Nitrite, UA neg     Protein, POC 30     Glucose, UA neg     Ketones, UA trace     Urobilinogen, UA 2.0     Bilirubin, POC neg     Blood, UA neg       Latest Reference Range & Units 02/14/22 09:56 03/07/23 12:06 10/09/23 09:26   Prostate Specific Antigen <=4.00 ng/mL 0.50 0.82 0.88             Impression:  1. Benign prostatic hyperplasia, unspecified whether lower urinary tract symptoms present  - POCT URINE DIPSTICK WITH MICROSCOPE, AUTOMATED      Plan:  Instructed patient to get PSA now will notify results.  Instructed patient continue Flomax 0.4 mg p.o. daily, mirabegron 25 mg p.o. daily, RTC 6 months with PSA.  on timed voiding every 2-3 hrs, double voiding, avoidance of bladder irritants such as alcohol, citrus foods, chocolate, caffeinated drinks, energy drinks, spicy foods, sugar, caffeine products, sodas. Instructed patient to avoid drinking fluids 1-2 hours prior to bedtime & void immediately before bedtime.

## 2024-02-29 ENCOUNTER — OFFICE VISIT (OUTPATIENT)
Dept: NEUROLOGY | Facility: CLINIC | Age: 64
End: 2024-02-29
Payer: MEDICAID

## 2024-02-29 VITALS
SYSTOLIC BLOOD PRESSURE: 113 MMHG | BODY MASS INDEX: 31.87 KG/M2 | HEART RATE: 84 BPM | DIASTOLIC BLOOD PRESSURE: 74 MMHG | WEIGHT: 215.19 LBS | HEIGHT: 69 IN | TEMPERATURE: 98 F | OXYGEN SATURATION: 98 % | RESPIRATION RATE: 18 BRPM

## 2024-02-29 DIAGNOSIS — G60.8 PERIPHERAL SENSORY-MOTOR AXONAL POLYNEUROPATHY: Primary | ICD-10-CM

## 2024-02-29 DIAGNOSIS — F81.0 READING IMPAIRMENT: ICD-10-CM

## 2024-02-29 PROCEDURE — 1159F MED LIST DOCD IN RCRD: CPT | Mod: CPTII,,, | Performed by: NURSE PRACTITIONER

## 2024-02-29 PROCEDURE — 3074F SYST BP LT 130 MM HG: CPT | Mod: CPTII,,, | Performed by: NURSE PRACTITIONER

## 2024-02-29 PROCEDURE — 1160F RVW MEDS BY RX/DR IN RCRD: CPT | Mod: CPTII,,, | Performed by: NURSE PRACTITIONER

## 2024-02-29 PROCEDURE — 3078F DIAST BP <80 MM HG: CPT | Mod: CPTII,,, | Performed by: NURSE PRACTITIONER

## 2024-02-29 PROCEDURE — 99214 OFFICE O/P EST MOD 30 MIN: CPT | Mod: S$PBB,,, | Performed by: NURSE PRACTITIONER

## 2024-02-29 PROCEDURE — 3008F BODY MASS INDEX DOCD: CPT | Mod: CPTII,,, | Performed by: NURSE PRACTITIONER

## 2024-02-29 PROCEDURE — 99215 OFFICE O/P EST HI 40 MIN: CPT | Mod: PBBFAC | Performed by: NURSE PRACTITIONER

## 2024-02-29 RX ORDER — PREGABALIN 50 MG/1
50 CAPSULE ORAL 3 TIMES DAILY
Qty: 90 CAPSULE | Refills: 3 | Status: SHIPPED | OUTPATIENT
Start: 2024-02-29 | End: 2024-05-01 | Stop reason: SDUPTHER

## 2024-02-29 NOTE — PROGRESS NOTES
[facility] Neurology Follow Up Office Visit Note    Initial Visit Date: 1/10/2023  Last Visit Date: 10/26/2023  Current Visit Date:  02/29/2024    Chief Complaint:     Chief Complaint   Patient presents with    follow up for neuropathy     Feels like it is getting worse     History of Present Illness:      This is 63 y.o. male with history of chronic lower back pain, alcohol dependence who was referred for bilateral lower extremity numbness for 1 + year.  During that visit, Lyrica 50mg PO BID was initiated.     Interim Hx:  Today, Pt taking Lyrica 50mg BID as prescribed and denies improvement w/burning/tingling/numbness feeling to feet w/Lyrica. Missed MRI C-spine/T-spine. Will reorder to have available for Dr. Elaine. Improved w/elevating legs. Worsens with activity. Fell this past week 2.2 to weakness/vomiting/diarrhea. Interferes w/ADLs. Drinks <1-2 beers weekly. Decreased cigar smoking to 1/week. Not working at this time. Continues w/R lower back pain that he has had for years, but more consistent for last few months. Pain scale 9/10, described as aching. Interferes w/ADLs. Worse with prolonged sitting, improves w/standing.    Presenting Hx:  Patient states that he had bilateral LBP radiating down bilateral LE, L>R. He reports decreased sensation in bilateral feet, left worse than right. Denied any trip and falls. Reports bilateral knee pain, left > right. Drinks alcohol throughout the week, drinking around a case of beer every 2 weeks. Quit smoking in 11/2019. Denied any saddle anesthesia. Denied any urinary incontinence. 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   Medications:     Current Outpatient Medications on File Prior to Visit   Medication Sig Dispense Refill    amLODIPine (NORVASC) 10 MG tablet Take 1 tablet (10 mg total) by mouth once daily. 90 tablet 1    mirabegron (MYRBETRIQ) 25 mg Tb24 ER tablet Take 1 tablet (25 mg total) by mouth once daily. 30 tablet 11    pregabalin (LYRICA) 50 MG capsule  TAKE 1 CAPSULE BY MOUTH 2 TIMES DAILY. 60 capsule 0    tamsulosin (FLOMAX) 0.4 mg Cap Take 1 capsule (0.4 mg total) by mouth once daily. 90 capsule 3    pantoprazole (PROTONIX) 40 MG tablet Take 1 tablet (40 mg total) by mouth once daily. (Patient not taking: Reported on 2/29/2024) 30 tablet 11    polyethylene glycol (MOVIPREP) 100-7.5-2.691 gram solution Take 2,000 mLs by mouth As instructed (Take as directed per instructions provided by clinic). Take as directed prior to colonoscopy (Patient not taking: Reported on 2/19/2024) 1 kit 0     Current Facility-Administered Medications on File Prior to Visit   Medication Dose Route Frequency Provider Last Rate Last Admin    lactated ringers infusion   Intravenous Continuous Deanna Gaona MD 10 mL/hr at 10/30/23 0808 New Bag at 10/30/23 0808    LIDOcaine (PF) 10 mg/ml (1%) injection 10 mg  1 mL Intradermal Once Marina De La Torre, AN           Labs:     Results for orders placed or performed in visit on 02/19/24   POCT URINE DIPSTICK WITH MICROSCOPE, AUTOMATED   Result Value Ref Range    Color, UA Yellow     Spec Grav UA 1.025     pH, UA 7.0     WBC, UA neg     Nitrite, UA neg     Protein, POC 30     Glucose, UA neg     Ketones, UA trace     Urobilinogen, UA 2.0     Bilirubin, POC neg     Blood, UA neg    POCT Bladder Scan   Result Value Ref Range    POC Residual Urine Volume 0 0 - 100 mL     Studies:      - MRI L-spine w/out Kyree 8/28/2023 - multilevel DDD w/facet degenerative changes w/multilevel canal and foraminal stenosis  - CT L-spine w/out Kyree 7/14/2023 - multilevel degenerative changes    - NCS/EMG bilateral LE 8/12/2022 by Dr. Marck London:  I have reviewed the study independently and with the patient. Incomplete study.  Also, no reference range.  No conduction velocity.  Latency and amplitude of SNAP and CMAP mostly symmetric without reference range.  No F-waves.     Review of Systems:     Review of Systems   All other systems reviewed and are  negative.    As per HPI    Physical Exams:     Vitals:    02/29/24 0951   BP: 113/74   Pulse:    Resp:    Temp:          Physical Exam  Vitals and nursing note reviewed.   Constitutional:       Appearance: Normal appearance.   HENT:      Head: Normocephalic and atraumatic.      Nose: Nose normal.      Mouth/Throat:      Mouth: Mucous membranes are moist.      Pharynx: Oropharynx is clear.   Eyes:      Conjunctiva/sclera: Conjunctivae normal.   Cardiovascular:      Rate and Rhythm: Normal rate and regular rhythm.      Pulses: Normal pulses.      Heart sounds: Normal heart sounds.   Pulmonary:      Effort: Pulmonary effort is normal.      Breath sounds: Normal breath sounds.   Abdominal:      General: Abdomen is flat.      Palpations: Abdomen is soft.   Musculoskeletal:         General: Normal range of motion.      Cervical back: Normal range of motion.   Neurological:      Mental Status: He is alert.   Psychiatric:         Mood and Affect: Mood normal.     Comprehensive Neurological Exam:  Mental Status: Alert Oriented to Self, Date, and Place. Comprehension wnl. No dysarthria.   CN II - XII: DUARTE, No APD, VFFC, No ptosis OU, EOMI without nystagmus, LT/Temp symmetric in CN V1-3 distribution, Hearing grossly intact, Face Symmetric, Tongue and Uvula midline, Trapezius symmetric bilateral.   Motor: tone and bulk wnl throughout, no abnormal involuntary or voluntary movements, 5/5 to confrontation, Fine finger movements wnl b/l, No pronator drift.   Sensory: LT, Proprioception, Vibration, PP, Temp symmetric subjectively decreased in bilateral LE throughout.   Reflexes: 2+ throughout except for 1+ in bilateral AJ, plantar reflexes downward bilateral   Cerebellar: FNF wnl b/l, RAHM wnl b/l.  Romberg: Negative  Gait: antalgic gait, stooped posture    Assessment:     This is 63 y.o. male with history of chronic lower back pain, alcohol dependence who was referred for bilateral lower extremity numbness. NCS/EMG results were  inconclusive due to missing components based on report. Exam and history more consistent with lumbar radiculopathy. Neuropathy not better on increased Lyrica BID. Did not completed MRI C-spine and T-spine. Will reorder and refer back to Dr. Elaine when completed. Fell earlier this week due to weakness/vomiting/diarrhea.    Problem List Items Addressed This Visit          Neuro    Peripheral sensory-motor axonal polyneuropathy - Primary    Relevant Orders    MRI Cervical Spine Without Contrast    MRI Thoracic Spine Without Contrast    Reading impairment     Plan:   [] increase Lyrica 50mg PO TID  [] fall precautions  [] MRI C-spine and T-spine - then notify Dr. Tapia's office for appt    RTC 4 Months     I have explained the treatment plan, diagnosis, and prognosis to patient. All questions are answered to the best of my knowledge.     Face to face time 30 minutes, including documentation, chart review, counseling, education, review of test results, relevant medical records, and coordination of care.     I have explained the treatment plan, diagnosis, and prognosis to patient. All questions are answered to the best of my knowledge.     02/29/2024

## 2024-03-11 PROBLEM — Z00.00 WELLNESS EXAMINATION: Status: RESOLVED | Noted: 2022-08-01 | Resolved: 2024-03-11

## 2024-04-10 ENCOUNTER — TELEPHONE (OUTPATIENT)
Dept: NEUROLOGY | Facility: CLINIC | Age: 64
End: 2024-04-10
Payer: MEDICAID

## 2024-04-10 NOTE — TELEPHONE ENCOUNTER
Spoke with patient, he is requesting MRI be rescheduled at the [facility] on [address]. Could you please contact patient to r/s?    Thank you.

## 2024-04-10 NOTE — TELEPHONE ENCOUNTER
----- Message from Annetta Oshea sent at 4/10/2024 12:39 PM CDT -----  Regarding: MRI  Pt is requesting MRI be completed at [facility] states that MRI that was supposed to be completed on 04/10/24 was unsuccessful due to an anxiety attack. Pt can be contacted at [phone]

## 2024-04-15 ENCOUNTER — TELEPHONE (OUTPATIENT)
Dept: NEUROLOGY | Facility: CLINIC | Age: 64
End: 2024-04-15
Payer: MEDICAID

## 2024-04-15 NOTE — TELEPHONE ENCOUNTER
----- Message from Jie Moody sent at 4/15/2024  8:54 AM CDT -----  Pt is stating he is having trouble walking, he is hurting so bad and would like a call back from Ms. Mims.  Pt can be reached @ [phone]

## 2024-04-15 NOTE — TELEPHONE ENCOUNTER
Patient states it started when he woke up this morning. States that when he tried to walk on the heel of his foot it felt like he had a nail going through his left foot and had to walk on his toes. Could hardly walk to the bathroom. He confirms he has been taking the Lyrica TID. Right foot still numb. Currently using crutches.  Was unable to complete MRI due to anxiety attack, awaiting call from scheduling to r/s. Provided him with their number.     Please advise

## 2024-04-17 NOTE — TELEPHONE ENCOUNTER
Patient states he had a few falls in the past prior, but states he just woke up and all of a sudden he had the pain. Advised him to go to ED or Urgent Care for further evaluation, he states he will try to find a ride

## 2024-05-01 DIAGNOSIS — G60.8 PERIPHERAL SENSORY-MOTOR AXONAL POLYNEUROPATHY: ICD-10-CM

## 2024-05-01 RX ORDER — PREGABALIN 50 MG/1
50 CAPSULE ORAL 3 TIMES DAILY
Qty: 90 CAPSULE | Refills: 0 | Status: SHIPPED | OUTPATIENT
Start: 2024-05-01 | End: 2024-05-21 | Stop reason: SDUPTHER

## 2024-05-01 NOTE — TELEPHONE ENCOUNTER
"Patient reports fall last week onto his countertop and broke glass, fell again today.     States complaint of lower back pain and in foot still.    Has not been to ED for further eval due to lack of transportation. Urged him to call family or [facility] for a ride, but would send to provider in mean time.    He questioned order for MRI, advised him that I provided him with scheduling's phone number during last call. Provided him with it again today and states he will call them tomorrow.    He states " I don't know what is going on with my body".    Also states he has difficulty going from sitting to standing. He states he goes to stand up and goes back down, has to push himself up.   "

## 2024-05-01 NOTE — TELEPHONE ENCOUNTER
Spoke with patient, urged him to go to ED as advised, even if he has to call 911. He verbalized understanding. Also requesting refill of Lyrica.    LOV: 02/29/24  NOV: 07/11/24

## 2024-05-01 NOTE — TELEPHONE ENCOUNTER
----- Message from Baylee Hayes sent at 5/1/2024  7:49 AM CDT -----  Regarding: Please Advise  Patient called and stated he would like to speak with a nurse regarding lower back pain.     Patient can be reached at [phone]    Please Advise

## 2024-05-20 DIAGNOSIS — N40.0 BENIGN PROSTATIC HYPERPLASIA, UNSPECIFIED WHETHER LOWER URINARY TRACT SYMPTOMS PRESENT: ICD-10-CM

## 2024-05-20 RX ORDER — MIRABEGRON 25 MG/1
25 TABLET, FILM COATED, EXTENDED RELEASE ORAL DAILY
Qty: 90 TABLET | Refills: 3 | Status: SHIPPED | OUTPATIENT
Start: 2024-05-20 | End: 2024-05-21 | Stop reason: SDUPTHER

## 2024-05-21 ENCOUNTER — HOSPITAL ENCOUNTER (OUTPATIENT)
Dept: RADIOLOGY | Facility: HOSPITAL | Age: 64
Discharge: HOME | End: 2024-05-21
Attending: NURSE PRACTITIONER
Payer: MEDICAID

## 2024-05-21 DIAGNOSIS — G60.8 PERIPHERAL SENSORY-MOTOR AXONAL POLYNEUROPATHY: ICD-10-CM

## 2024-05-21 DIAGNOSIS — N40.1 BENIGN PROSTATIC HYPERPLASIA WITH WEAK URINARY STREAM: ICD-10-CM

## 2024-05-21 DIAGNOSIS — R39.12 BENIGN PROSTATIC HYPERPLASIA WITH WEAK URINARY STREAM: ICD-10-CM

## 2024-05-21 DIAGNOSIS — N40.0 BENIGN PROSTATIC HYPERPLASIA, UNSPECIFIED WHETHER LOWER URINARY TRACT SYMPTOMS PRESENT: ICD-10-CM

## 2024-05-21 PROCEDURE — 72141 MRI NECK SPINE W/O DYE: CPT | Mod: TC

## 2024-05-21 PROCEDURE — 72146 MRI CHEST SPINE W/O DYE: CPT | Mod: TC

## 2024-05-21 RX ORDER — MIRABEGRON 25 MG/1
25 TABLET, FILM COATED, EXTENDED RELEASE ORAL DAILY
Qty: 90 TABLET | Refills: 3 | Status: SHIPPED | OUTPATIENT
Start: 2024-05-21 | End: 2025-05-21

## 2024-05-21 RX ORDER — TAMSULOSIN HYDROCHLORIDE 0.4 MG/1
0.4 CAPSULE ORAL DAILY
Qty: 90 CAPSULE | Refills: 3 | Status: SHIPPED | OUTPATIENT
Start: 2024-05-21 | End: 2025-05-21

## 2024-05-21 RX ORDER — PREGABALIN 50 MG/1
50 CAPSULE ORAL 3 TIMES DAILY
Qty: 90 CAPSULE | Refills: 1 | Status: SHIPPED | OUTPATIENT
Start: 2024-05-21 | End: 2024-07-20

## 2024-05-21 NOTE — TELEPHONE ENCOUNTER
----- Message from Baylee Hayes sent at 5/21/2024 12:14 PM CDT -----  Regarding: Medication Refill  Patient is in need of a refill on the medication pregabalin (LYRICA) 50 MG capsule sent to [id number] - [place], [place] - [address] AT [address]    Patient took his last dose yesterday.    Patient can be reached at [phone]    Please Advise

## 2024-05-22 ENCOUNTER — TELEPHONE (OUTPATIENT)
Dept: NEUROLOGY | Facility: CLINIC | Age: 64
End: 2024-05-22
Payer: MEDICAID

## 2024-05-22 NOTE — TELEPHONE ENCOUNTER
Called Pt and discussed MRI C-spine and T-spine with patient. Day has bone spurs and arthritis. Dr. Elaine office will call with appt.

## 2024-05-23 ENCOUNTER — TELEPHONE (OUTPATIENT)
Dept: INTERNAL MEDICINE | Facility: CLINIC | Age: 64
End: 2024-05-23
Payer: MEDICAID

## 2024-05-23 DIAGNOSIS — Z01.818 PRE-OP TESTING: Primary | ICD-10-CM

## 2024-06-07 ENCOUNTER — LAB VISIT (OUTPATIENT)
Dept: LAB | Facility: HOSPITAL | Age: 64
End: 2024-06-07
Attending: NURSE PRACTITIONER
Payer: MEDICAID

## 2024-06-07 DIAGNOSIS — Z01.818 PRE-OP TESTING: ICD-10-CM

## 2024-06-07 DIAGNOSIS — D64.9 ANEMIA, UNSPECIFIED TYPE: ICD-10-CM

## 2024-06-07 DIAGNOSIS — R31.29 MICROHEMATURIA: ICD-10-CM

## 2024-06-07 DIAGNOSIS — N40.0 BENIGN PROSTATIC HYPERPLASIA, UNSPECIFIED WHETHER LOWER URINARY TRACT SYMPTOMS PRESENT: ICD-10-CM

## 2024-06-07 LAB
ALBUMIN SERPL-MCNC: 3.4 G/DL (ref 3.4–4.8)
ALBUMIN/GLOB SERPL: 0.8 RATIO (ref 1.1–2)
ALP SERPL-CCNC: 55 UNIT/L (ref 40–150)
ALT SERPL-CCNC: 7 UNIT/L (ref 0–55)
ANION GAP SERPL CALC-SCNC: 9 MEQ/L
AST SERPL-CCNC: 17 UNIT/L (ref 5–34)
BASOPHILS # BLD AUTO: 0.04 X10(3)/MCL
BASOPHILS NFR BLD AUTO: 0.6 %
BILIRUB SERPL-MCNC: 0.4 MG/DL
BUN SERPL-MCNC: 6.5 MG/DL (ref 8.4–25.7)
CALCIUM SERPL-MCNC: 9.3 MG/DL (ref 8.8–10)
CHLORIDE SERPL-SCNC: 106 MMOL/L (ref 98–107)
CO2 SERPL-SCNC: 26 MMOL/L (ref 23–31)
CREAT SERPL-MCNC: 0.8 MG/DL (ref 0.73–1.18)
CREAT/UREA NIT SERPL: 8
EOSINOPHIL # BLD AUTO: 0.27 X10(3)/MCL (ref 0–0.9)
EOSINOPHIL NFR BLD AUTO: 4 %
ERYTHROCYTE [DISTWIDTH] IN BLOOD BY AUTOMATED COUNT: 12.7 % (ref 11.5–17)
FERRITIN SERPL-MCNC: 687.98 NG/ML (ref 21.81–274.66)
FOLATE SERPL-MCNC: 7 NG/ML (ref 7–31.4)
GFR SERPLBLD CREATININE-BSD FMLA CKD-EPI: >60 ML/MIN/1.73/M2
GLOBULIN SER-MCNC: 4.1 GM/DL (ref 2.4–3.5)
GLUCOSE SERPL-MCNC: 99 MG/DL (ref 82–115)
HCT VFR BLD AUTO: 38.4 % (ref 42–52)
HGB BLD-MCNC: 13.3 G/DL (ref 14–18)
IMM GRANULOCYTES # BLD AUTO: 0.16 X10(3)/MCL (ref 0–0.04)
IMM GRANULOCYTES NFR BLD AUTO: 2.4 %
IRON SATN MFR SERPL: 33 % (ref 20–50)
IRON SERPL-MCNC: 75 UG/DL (ref 65–175)
LYMPHOCYTES # BLD AUTO: 2.51 X10(3)/MCL (ref 0.6–4.6)
LYMPHOCYTES NFR BLD AUTO: 37.3 %
MCH RBC QN AUTO: 30.6 PG (ref 27–31)
MCHC RBC AUTO-ENTMCNC: 34.6 G/DL (ref 33–36)
MCV RBC AUTO: 88.5 FL (ref 80–94)
MONOCYTES # BLD AUTO: 0.62 X10(3)/MCL (ref 0.1–1.3)
MONOCYTES NFR BLD AUTO: 9.2 %
NEUTROPHILS # BLD AUTO: 3.13 X10(3)/MCL (ref 2.1–9.2)
NEUTROPHILS NFR BLD AUTO: 46.5 %
NRBC BLD AUTO-RTO: 0 %
PLATELET # BLD AUTO: 313 X10(3)/MCL (ref 130–400)
PMV BLD AUTO: 10.9 FL (ref 7.4–10.4)
POTASSIUM SERPL-SCNC: 3.4 MMOL/L (ref 3.5–5.1)
PROT SERPL-MCNC: 7.5 GM/DL (ref 5.8–7.6)
PSA SERPL-MCNC: 0.56 NG/ML
RBC # BLD AUTO: 4.34 X10(6)/MCL (ref 4.7–6.1)
SODIUM SERPL-SCNC: 141 MMOL/L (ref 136–145)
TIBC SERPL-MCNC: 150 UG/DL (ref 69–240)
TIBC SERPL-MCNC: 225 UG/DL (ref 250–450)
TRANSFERRIN SERPL-MCNC: 206 MG/DL (ref 163–344)
VIT B12 SERPL-MCNC: 651 PG/ML (ref 213–816)
WBC # SPEC AUTO: 6.73 X10(3)/MCL (ref 4.5–11.5)

## 2024-06-07 PROCEDURE — 84153 ASSAY OF PSA TOTAL: CPT

## 2024-06-07 PROCEDURE — 82746 ASSAY OF FOLIC ACID SERUM: CPT

## 2024-06-07 PROCEDURE — 36415 COLL VENOUS BLD VENIPUNCTURE: CPT

## 2024-06-07 PROCEDURE — 83540 ASSAY OF IRON: CPT

## 2024-06-07 PROCEDURE — 82728 ASSAY OF FERRITIN: CPT

## 2024-06-07 PROCEDURE — 82607 VITAMIN B-12: CPT

## 2024-06-07 PROCEDURE — 85025 COMPLETE CBC W/AUTO DIFF WBC: CPT

## 2024-06-07 PROCEDURE — 80053 COMPREHEN METABOLIC PANEL: CPT

## 2024-06-17 ENCOUNTER — OFFICE VISIT (OUTPATIENT)
Dept: INTERNAL MEDICINE | Facility: CLINIC | Age: 64
End: 2024-06-17
Payer: MEDICAID

## 2024-06-17 VITALS
HEART RATE: 64 BPM | BODY MASS INDEX: 32.53 KG/M2 | TEMPERATURE: 98 F | WEIGHT: 219.63 LBS | SYSTOLIC BLOOD PRESSURE: 109 MMHG | RESPIRATION RATE: 18 BRPM | HEIGHT: 69 IN | DIASTOLIC BLOOD PRESSURE: 68 MMHG

## 2024-06-17 DIAGNOSIS — G60.8 PERIPHERAL SENSORY-MOTOR AXONAL POLYNEUROPATHY: ICD-10-CM

## 2024-06-17 DIAGNOSIS — Z12.5 SCREENING FOR MALIGNANT NEOPLASM OF PROSTATE: ICD-10-CM

## 2024-06-17 DIAGNOSIS — E87.6 HYPOKALEMIA: ICD-10-CM

## 2024-06-17 DIAGNOSIS — M48.061 SPINAL STENOSIS OF LUMBAR REGION WITHOUT NEUROGENIC CLAUDICATION: ICD-10-CM

## 2024-06-17 DIAGNOSIS — M51.36 DDD (DEGENERATIVE DISC DISEASE), LUMBAR: ICD-10-CM

## 2024-06-17 DIAGNOSIS — I10 PRIMARY HYPERTENSION: Primary | ICD-10-CM

## 2024-06-17 PROCEDURE — 3008F BODY MASS INDEX DOCD: CPT | Mod: CPTII,,, | Performed by: NURSE PRACTITIONER

## 2024-06-17 PROCEDURE — 3074F SYST BP LT 130 MM HG: CPT | Mod: CPTII,,, | Performed by: NURSE PRACTITIONER

## 2024-06-17 PROCEDURE — 99214 OFFICE O/P EST MOD 30 MIN: CPT | Mod: PBBFAC | Performed by: NURSE PRACTITIONER

## 2024-06-17 PROCEDURE — 99214 OFFICE O/P EST MOD 30 MIN: CPT | Mod: S$PBB,,, | Performed by: NURSE PRACTITIONER

## 2024-06-17 PROCEDURE — 1160F RVW MEDS BY RX/DR IN RCRD: CPT | Mod: CPTII,,, | Performed by: NURSE PRACTITIONER

## 2024-06-17 PROCEDURE — 1159F MED LIST DOCD IN RCRD: CPT | Mod: CPTII,,, | Performed by: NURSE PRACTITIONER

## 2024-06-17 PROCEDURE — 3078F DIAST BP <80 MM HG: CPT | Mod: CPTII,,, | Performed by: NURSE PRACTITIONER

## 2024-06-17 RX ORDER — POTASSIUM CHLORIDE 750 MG/1
10 CAPSULE, EXTENDED RELEASE ORAL DAILY
Qty: 90 CAPSULE | Refills: 1 | Status: SHIPPED | OUTPATIENT
Start: 2024-06-17

## 2024-06-17 NOTE — ASSESSMENT & PLAN NOTE
Reviewed recent MRI and x-rays of cervical, thoracic, and lumbar spine.  He is following with Neurosurgery, reports has upcoming appointment in [place], has a preop appointment already scheduled.  We will request records so we know if any procedures we will be occurring and what procedure will be occurring.  Continue follow-up with Neurology and Neurosurgery, fall precautions advised.  He denies any bowel/bladder dysfunction.  Walking with a cane.

## 2024-06-17 NOTE — PROGRESS NOTES
Internal Medicine Clinic  Nicolás Perez DNP     Patient ID: [id number]     Chief Complaint: Follow-up (Lab results)      HPI:     Arcadio Scott is a 63 y.o. male here today for follow up with PCP.     PmHx: Alcoholic gastritis, Sciatica, left leg pain, past TBO (stopped 2019), lumbar stenosis.       Health Maintenance         Date Due Completion Date    TETANUS VACCINE 10/09/2024 (Originally 11/3/1978) ---    Shingles Vaccine (1 of 2) 10/09/2024 (Originally 11/3/2011) ---    PROSTATE-SPECIFIC ANTIGEN 06/07/2025 6/7/2024    Hemoglobin A1c (Diabetic Prevention Screening) 10/09/2026 10/9/2023    Lipid Panel 10/09/2028 10/9/2023    Colorectal Cancer Screening 10/30/2033 10/30/2023            Past Medical History:   Diagnosis Date    GERD (gastroesophageal reflux disease)     Hypertension     Neuropathy     Unspecified glaucoma         Past Surgical History:   Procedure Laterality Date    COLONOSCOPY W/ POLYPECTOMY  01/28/2022    COLONOSCOPY, WITH POLYPECTOMY USING SNARE N/A 10/30/2023    Procedure: COLONOSCOPY, WITH POLYPECTOMY USING SNARE;  Surgeon: Ngoc Fenton MD;  Location: [facility] ENDOSCOPY;  Service: Gastroenterology;  Laterality: N/A;    CYST REMOVAL      Facial    EGD, WITH CLOSED BIOPSY N/A 10/30/2023    Procedure: EGD;  Surgeon: Ngoc Fenton MD;  Location: [facility] ENDOSCOPY;  Service: Gastroenterology;  Laterality: N/A;    SURGICAL REMOVAL OF LESION OF FACE Bilateral 07/18/2022    Procedure: EXCISION, LESION, FACE AND NECK;  Surgeon: Marcelo Capps MD;  Location: [facility] OR;  Service: ENT;  Laterality: Bilateral;        Social History     Tobacco Use    Smoking status: Former     Types: Cigars, Cigarettes     Passive exposure: Current    Smokeless tobacco: Never    Tobacco comments:     Quit cigarettes in 2019, smokes cigars 1-2 times a week   Substance and Sexual Activity    Alcohol use: Yes     Alcohol/week: 1.0 standard drink of alcohol     Types: 1 Cans of beer per week     Comment:  occasionally    Drug use: Never    Sexual activity: Not Currently        Current Outpatient Medications   Medication Instructions    amLODIPine (NORVASC) 10 mg, Oral, Daily    mirabegron (MYRBETRIQ) 25 mg, Oral, Daily    pantoprazole (PROTONIX) 40 mg, Oral, Daily    polyethylene glycol (MOVIPREP) 100-7.5-2.691 gram solution 2,000 mLs, Oral, See admin instructions, Take as directed prior to colonoscopy    potassium chloride (MICRO-K) 10 MEQ CpSR 10 mEq, Oral, Daily    pregabalin (LYRICA) 50 mg, Oral, 3 times daily    tamsulosin (FLOMAX) 0.4 mg, Oral, Daily       Review of patient's allergies indicates:  No Known Allergies     Patient Care Team:  iNcolás Perez FNP as PCP - General (Family Medicine)     Subjective:     Review of Systems   Constitutional:  Negative for appetite change, chills, diaphoresis and fever.   HENT:  Negative for ear pain, sinus pain and sore throat.    Eyes:  Negative for pain and visual disturbance.   Respiratory:  Negative for cough, shortness of breath and wheezing.    Cardiovascular:  Negative for chest pain, palpitations and leg swelling.   Gastrointestinal:  Negative for abdominal pain, blood in stool, diarrhea, nausea and vomiting.   Endocrine: Negative for cold intolerance.   Genitourinary:  Negative for difficulty urinating, dysuria, frequency and hematuria.   Musculoskeletal:  Positive for arthralgias and back pain. Negative for joint swelling and myalgias.   Skin:  Negative for color change and rash.   Allergic/Immunologic: Negative.    Neurological: Negative.  Negative for dizziness, syncope, light-headedness and numbness.   Hematological: Negative.    Psychiatric/Behavioral: Negative.  Negative for dysphoric mood and suicidal ideas. The patient is not nervous/anxious.    All other systems reviewed and are negative.      12 point review of systems conducted, negative except as stated in the history of present illness. See HPI for details.    Objective:     Visit Vitals  BP  "109/68 (BP Location: Right arm, Patient Position: Sitting, BP Method: Medium (Automatic))   Pulse 64   Temp 98.4 °F (36.9 °C) (Oral)   Resp 18   Ht 5' 9.02" (1.753 m)   Wt 99.6 kg (219 lb 9.6 oz)   BMI 32.41 kg/m²       Physical Exam  Vitals and nursing note reviewed.   Constitutional:       General: He is not in acute distress.     Appearance: He is not ill-appearing.   HENT:      Head: Normocephalic and atraumatic.      Mouth/Throat:      Mouth: Mucous membranes are moist.      Pharynx: Oropharynx is clear.   Eyes:      General: No scleral icterus.     Extraocular Movements: Extraocular movements intact.      Conjunctiva/sclera: Conjunctivae normal.      Pupils: Pupils are equal, round, and reactive to light.   Neck:      Vascular: No carotid bruit.   Cardiovascular:      Rate and Rhythm: Normal rate and regular rhythm.      Heart sounds: No murmur heard.     No friction rub. No gallop.   Pulmonary:      Effort: Pulmonary effort is normal. No respiratory distress.      Breath sounds: Normal breath sounds. No wheezing, rhonchi or rales.   Abdominal:      General: Abdomen is flat. Bowel sounds are normal. There is no distension.      Palpations: Abdomen is soft. There is no mass.      Tenderness: There is no abdominal tenderness.   Musculoskeletal:         General: Normal range of motion.      Cervical back: Normal range of motion and neck supple.      Comments: Ambulates with a cane on the right side, single cane and is not adequately stable   Skin:     General: Skin is warm and dry.   Neurological:      General: No focal deficit present.      Mental Status: He is alert.   Psychiatric:         Mood and Affect: Mood normal.         Labs Reviewed:     Chemistry:  Lab Results   Component Value Date     06/07/2024    K 3.4 (L) 06/07/2024    BUN 6.5 (L) 06/07/2024    CREATININE 0.80 06/07/2024    EGFRNORACEVR >60 06/07/2024    GLUCOSE 99 06/07/2024    CALCIUM 9.3 06/07/2024    ALKPHOS 55 06/07/2024    LABPROT 7.5 " 06/07/2024    ALBUMIN 3.4 06/07/2024    BILIDIR 0.3 02/14/2022    IBILI 0.60 02/14/2022    AST 17 06/07/2024    ALT 7 06/07/2024    MG 1.70 09/29/2023    PHOS 3.0 09/29/2023    TSH 0.497 10/09/2023    XDPNSS0XMSR 0.94 04/12/2023    PSA 0.56 06/07/2024        Lab Results   Component Value Date    HGBA1C 5.2 10/09/2023        Hematology:  Lab Results   Component Value Date    WBC 6.73 06/07/2024    HGB 13.3 (L) 06/07/2024    HCT 38.4 (L) 06/07/2024     06/07/2024       Lipid Panel:  Lab Results   Component Value Date    CHOL 173 10/09/2023    HDL 52 10/09/2023    .00 10/09/2023    TRIG 79 10/09/2023    TOTALCHOLEST 3 10/09/2023        Urine:  Lab Results   Component Value Date    APPEARANCEUA Turbid (A) 09/27/2023    SGUA 1.027 09/27/2023    PROTEINUA 3+ (A) 09/27/2023    KETONESUA 3+ (A) 09/27/2023    LEUKOCYTESUR 25 (A) 09/27/2023    RBCUA 6-10 (A) 09/27/2023    WBCUA 6-10 (A) 09/27/2023    BACTERIA Trace (A) 09/27/2023    SQEPUA Occ (A) 09/27/2023    HYALINECASTS >20 (A) 09/27/2023        Assessment:       ICD-10-CM ICD-9-CM   1. Primary hypertension  I10 401.9   2. DDD (degenerative disc disease), lumbar  M51.36 722.52   3. Peripheral sensory-motor axonal polyneuropathy  G60.8 356.8   4. Hypokalemia  E87.6 276.8   5. Spinal stenosis of lumbar region without neurogenic claudication  M48.061 724.02   6. Screening for malignant neoplasm of prostate  Z12.5 V76.44        Plan:     1. Primary hypertension  Overview:  Amlodipine 10 mg once daily    Assessment & Plan:  Goal BP < 140/90, best goal is BP <130/80 consistently   Reduce the amount of salt in your diet, follow a 2 gm sodium, DASH diet daily            Lose weight if you are overweight or have obesity  Avoid drinking too much alcohol  Stop smoking  Exercise at least 30 minutes per day most days of the week      Orders:  -     CBC Auto Differential; Future; Expected date: 12/17/2024  -     Comprehensive Metabolic Panel; Future; Expected date:  12/17/2024  -     Lipid Panel; Future; Expected date: 12/17/2024  -     TSH; Future; Expected date: 12/17/2024  -     Hemoglobin A1C; Future; Expected date: 12/17/2024  -     Urinalysis; Future; Expected date: 12/17/2024  -     T4, Free; Future; Expected date: 12/17/2024  -     Microalbumin/Creatinine Ratio, Urine; Future; Expected date: 12/17/2024    2. DDD (degenerative disc disease), lumbar  -     CANE FOR HOME USE  -     WALKER FOR HOME USE    3. Peripheral sensory-motor axonal polyneuropathy  Assessment & Plan:  Continue follow-up with Neurology, planning to see Neurosurgery for discussion of upcoming lumbar spine procedure.  Prescribed Lyrica 3 times daily by specialists, fall precautions are advised.  I have written him a prescription today to get a better cane, a quad cane and a walker for home use.      4. Hypokalemia  -     potassium chloride (MICRO-K) 10 MEQ CpSR; Take 1 capsule (10 mEq total) by mouth once daily.  Dispense: 90 capsule; Refill: 1    5. Spinal stenosis of lumbar region without neurogenic claudication  Assessment & Plan:  Reviewed recent MRI and x-rays of cervical, thoracic, and lumbar spine.  He is following with Neurosurgery, reports has upcoming appointment in [place], has a preop appointment already scheduled.  We will request records so we know if any procedures we will be occurring and what procedure will be occurring.  Continue follow-up with Neurology and Neurosurgery, fall precautions advised.  He denies any bowel/bladder dysfunction.  Walking with a cane.    Orders:  -     CANE FOR HOME USE  -     WALKER FOR HOME USE    6. Screening for malignant neoplasm of prostate  Overview:  Colon Cancer Screening: Colonoscopy 1/28/2022 that revealed mx polyps (7 removed). No evidence of malignancy. Plans to repeat colonoscopy in 1 year  10/30/23 colonoscopy. Path as follows:  Sigmoid colon polyp x 3, Polypectomy:  - Multiple fragments of tubular adenoma.  - Hyperplastic polyp x 1.  - No  "evidence of malignancy.   Cecum colon polyp x 2, Polypectomy:  - Tubular adenoma x 2.  - No evidence of malignancy.   Ascending colon polyp x 6, Polypectomy:  - Multiple fragments of tubular adenoma.  - No evidence of malignancy.   Transverse colon polyp x 12, Polypectomy:  - Multiple fragments of tubular adenoma.  - No evidence of malignancy."  Prostate Cancer Screening Latest Reference Range & Units 03/07/23 12:06   PSA, Screen <=4.00 ng/mL 0.82   Prostate Cancer Screening: PSA 0.88, 10/9/23   Latest Reference Range & Units 10/09/23 09:26   Hep A IgM Nonreactive  Nonreactive   Hep B C IgM Nonreactive  Nonreactive   Hepatitis B Surface Antigen Nonreactive  Nonreactive   Hepatitis C Ab Nonreactive  Nonreactive       Orders:  -     PSA, Screening; Future; Expected date: 12/17/2024         Follow up in about 6 months (around 12/17/2024) for follow up, with lab work prior to visit. In addition to their scheduled follow up, the patient has also been instructed to follow up on as needed basis.     Future Appointments   Date Time Provider Department Center   6/21/2024  8:30 AM SURGERY CLEAR, [facility] INTERNAL MEDICINE [facility] IM RES [place]    6/26/2024 12:45 PM Manolo Elaine MD [facility]   7/11/2024 10:30 AM Felicita Ramirez ANP [facility] NEURO [place]    8/19/2024  8:00 AM Chaitanya Rodriguez, EMILY [facility] UROLO [place]    12/17/2024  1:20 PM Nicolás Perez FNP [facility] INTMED [place] [facility]        AN [place]    "

## 2024-06-17 NOTE — ASSESSMENT & PLAN NOTE
Continue follow-up with Neurology, planning to see Neurosurgery for discussion of upcoming lumbar spine procedure.  Prescribed Lyrica 3 times daily by specialists, fall precautions are advised.  I have written him a prescription today to get a better cane, a quad cane and a walker for home use.

## 2024-06-21 ENCOUNTER — OFFICE VISIT (OUTPATIENT)
Dept: INTERNAL MEDICINE | Facility: CLINIC | Age: 64
End: 2024-06-21
Payer: MEDICAID

## 2024-06-21 VITALS
HEIGHT: 69 IN | TEMPERATURE: 98 F | SYSTOLIC BLOOD PRESSURE: 125 MMHG | WEIGHT: 219 LBS | OXYGEN SATURATION: 98 % | HEART RATE: 72 BPM | RESPIRATION RATE: 20 BRPM | BODY MASS INDEX: 32.44 KG/M2 | DIASTOLIC BLOOD PRESSURE: 74 MMHG

## 2024-06-21 DIAGNOSIS — I10 HYPERTENSION, UNSPECIFIED TYPE: ICD-10-CM

## 2024-06-21 DIAGNOSIS — Z78.9 ALCOHOL USE: ICD-10-CM

## 2024-06-21 DIAGNOSIS — Z01.818 PREOP EXAMINATION: Primary | ICD-10-CM

## 2024-06-21 PROCEDURE — 99214 OFFICE O/P EST MOD 30 MIN: CPT | Mod: PBBFAC

## 2024-06-21 NOTE — PROGRESS NOTES
INTERNAL MEDICINE RESIDENT CLINIC  CLINIC NOTE    Patient Name: Arcadio Scott  YOB: 1960    PRESENTING HISTORY       History of Present Illness:  Mr. Arcadio Scott is a 63 y.o. male w/ an active medical problem list including HTN, alcoholic gastritis,lumbar stenosis who is presenting to our clinic for pre-op clearance.    He is going to have cataract extraction on hie right eye on 7/1/24, he has no complains.  He is not active due to knee pain and lower extremity swelling.  He is compliant with home medication.    CURRENT MEDICATIONS      Current Outpatient Medications on File Prior to Visit   Medication Sig    mirabegron (MYRBETRIQ) 25 mg Tb24 ER tablet Take 1 tablet (25 mg total) by mouth once daily.    pantoprazole (PROTONIX) 40 MG tablet Take 1 tablet (40 mg total) by mouth once daily.    potassium chloride (MICRO-K) 10 MEQ CpSR Take 1 capsule (10 mEq total) by mouth once daily.    pregabalin (LYRICA) 50 MG capsule Take 1 capsule (50 mg total) by mouth 3 (three) times daily.    tamsulosin (FLOMAX) 0.4 mg Cap Take 1 capsule (0.4 mg total) by mouth once daily.    amLODIPine (NORVASC) 10 MG tablet Take 1 tablet (10 mg total) by mouth once daily.    polyethylene glycol (MOVIPREP) 100-7.5-2.691 gram solution Take 2,000 mLs by mouth As instructed (Take as directed per instructions provided by clinic). Take as directed prior to colonoscopy (Patient not taking: Reported on 2/19/2024)     Current Facility-Administered Medications on File Prior to Visit   Medication    lactated ringers infusion    LIDOcaine (PF) 10 mg/ml (1%) injection 10 mg         Review of Systems   Constitutional:  Negative for chills, fever and weight loss.   Respiratory:  Negative for cough and shortness of breath.    Cardiovascular:  Negative for chest pain and leg swelling.   Gastrointestinal:  Negative for abdominal pain, constipation, diarrhea, nausea and vomiting.   Genitourinary:  Negative for dysuria and urgency.       PAST  HISTORY:     Past Medical History:   Diagnosis Date    GERD (gastroesophageal reflux disease)     Hypertension     Neuropathy     Unspecified glaucoma        Past Surgical History:   Procedure Laterality Date    COLONOSCOPY W/ POLYPECTOMY  01/28/2022    COLONOSCOPY, WITH POLYPECTOMY USING SNARE N/A 10/30/2023    Procedure: COLONOSCOPY, WITH POLYPECTOMY USING SNARE;  Surgeon: Ngoc Fenton MD;  Location: [facility] ENDOSCOPY;  Service: Gastroenterology;  Laterality: N/A;    CYST REMOVAL      Facial    EGD, WITH CLOSED BIOPSY N/A 10/30/2023    Procedure: EGD;  Surgeon: Ngoc Fenton MD;  Location: [facility] ENDOSCOPY;  Service: Gastroenterology;  Laterality: N/A;    SURGICAL REMOVAL OF LESION OF FACE Bilateral 07/18/2022    Procedure: EXCISION, LESION, FACE AND NECK;  Surgeon: Marcelo Capps MD;  Location: [facility] OR;  Service: ENT;  Laterality: Bilateral;       Family History   Problem Relation Name Age of Onset    Diabetes Mother      Heart disease Mother      Cancer Father          Unsure of source    Aneurysm Sister      Diabetes Brother      Stomach cancer Brother      Alzheimer's disease Maternal Grandmother         Social History     Socioeconomic History    Marital status: Single    Number of children: 0   Tobacco Use    Smoking status: Former     Types: Cigars, Cigarettes     Passive exposure: Current    Smokeless tobacco: Never    Tobacco comments:     Quit cigarettes in 2019, smokes cigars 1-2 times a week   Substance and Sexual Activity    Alcohol use: Yes     Alcohol/week: 1.0 standard drink of alcohol     Types: 1 Cans of beer per week     Comment: occasionally    Drug use: Never    Sexual activity: Not Currently     Social Determinants of Health     Financial Resource Strain: Low Risk  (9/28/2023)    Overall Financial Resource Strain (CARDIA)     Difficulty of Paying Living Expenses: Not hard at all   Food Insecurity: No Food Insecurity (9/28/2023)    Hunger Vital Sign     Worried About Running Out  "of Food in the Last Year: Never true     Ran Out of Food in the Last Year: Never true   Transportation Needs: No Transportation Needs (9/28/2023)    PRAPARE - Transportation     Lack of Transportation (Medical): No     Lack of Transportation (Non-Medical): No   Physical Activity: Inactive (9/28/2023)    Exercise Vital Sign     Days of Exercise per Week: 0 days     Minutes of Exercise per Session: 0 min   Stress: No Stress Concern Present (9/28/2023)    [employer] - Occupational Stress Questionnaire     Feeling of Stress : Not at all   Housing Stability: Low Risk  (6/17/2024)    Housing Stability Vital Sign     Unable to Pay for Housing in the Last Year: No     Homeless in the Last Year: No       Review of patient's allergies indicates:  No Known Allergies    OBJECTIVE:   Vital Signs:  Vitals:    06/21/24 0751   BP: 125/74   Pulse: 72   Resp: 20   Temp: 98.2 °F (36.8 °C)   SpO2: 98%   Weight: 99.3 kg (219 lb)   Height: 5' 9" (1.753 m)       No results found for this or any previous visit (from the past 24 hour(s)).      Physical Exam  Cardiovascular:      Rate and Rhythm: Normal rate and regular rhythm.      Pulses: Normal pulses.      Heart sounds: Normal heart sounds.   Pulmonary:      Effort: Pulmonary effort is normal.      Breath sounds: Normal breath sounds.   Abdominal:      General: Bowel sounds are normal.      Palpations: Abdomen is soft.   Musculoskeletal:      Cervical back: Normal range of motion and neck supple.      Comments: Limited due to pain   Skin:     General: Skin is warm and dry.      Capillary Refill: Capillary refill takes less than 2 seconds.   Neurological:      Mental Status: He is oriented to person, place, and time.      Comments: Decreased sensation in both feet   Psychiatric:         Mood and Affect: Mood normal.         Behavior: Behavior normal.         Laboratory  CMP:   Recent Labs   Lab 10/13/21  1549 02/14/22  0956 03/07/23  1206 09/29/23  0401 " 10/09/23  0926 06/07/24  0927   Sodium 141 138   < > 135 L 139 141   Potassium 3.3 L 3.3   < > 2.9 L 3.7 3.4 L   CO2 20 L 26   < > 25 22 L 26   Blood Urea Nitrogen 8.2 L 7.6   < > 5.4 L 7.1 L 6.5 L   Creatinine 1.04 0.77   < > 0.76 0.86 0.80   Glucose 126 H 116   < > 119 H 103 99   Calcium 10.3 H 9.3   < > 9.2 9.3 9.3   Albumin 4.6 4.2   < > 3.4 3.7 3.4   Bilirubin Total 1.6 H 0.9   < > 1.3 0.3 0.4   Bilirubin Direct 0.7 H 0.3  --   --   --   --    Bilirubin Indirect 0.90 H 0.60  --   --   --   --     H 32   < > 61 H 24 17   ALT 35 8   < > 20 23 7   ALP 90 80   < > 56 56 55    < > = values in this interval not displayed.     CBC:   Recent Labs   Lab 09/29/23  0401 10/09/23  0926 06/07/24  0927   WBC 5.50 6.65 6.73   Neut # 2.92 3.98 3.13   RBC 3.99 L 4.08 L 4.34 L   Hgb 12.4 L 12.5 L 13.3 L   Hct 34.6 L 37.0 L 38.4 L   Platelet 114 L 225 313   MCV 86.7 90.7 88.5   RDW 11.1 L 12.9 12.7     FLP:   Recent Labs   Lab 02/14/22  0956 03/07/23  1206 10/09/23  0926   Cholesterol Total 230 188 173   HDL Cholesterol 73 59 52   LDL Cholesterol 136.00 107.00 105.00   Triglyceride 107 109 79     DM:   Recent Labs   Lab 02/14/22  0956 03/07/23  1206 04/12/23  1417 05/03/23  1221 09/29/23  0401 10/09/23  0926 06/07/24  0927   Hemoglobin A1c 5.1  --  5.0  --   --  5.2  --    Creatinine 0.77   < >  --    < > 0.76 0.86 0.80    < > = values in this interval not displayed.     Thyroid:   Recent Labs   Lab 02/14/22  0956 04/12/23  1417 10/09/23  0926   TSH 0.5815 0.495 0.497   Thyroxine Free  --  0.94  --      Anemia:   Recent Labs   Lab 06/07/24  0927   Hgb 13.3 L   Hct 38.4 L   Iron Level 75   Ferritin Level 687.98 H   Iron Binding Capacity Total 225 L   Vitamin B12 651   Folate Level 7.0         ASSESSMENT & PLAN:     Cataract Extraction  HTN  /74  METS cannot be assessed due to limited physical activity with knee pain  Continue taking medications, including Norvasc  RCRI 0    Patient is low risk for a low risk  procedure    Robby Edmonds MD  Internal Medicine - PGY-1

## 2024-06-28 ENCOUNTER — TELEPHONE (OUTPATIENT)
Dept: INTERNAL MEDICINE | Facility: CLINIC | Age: 64
End: 2024-06-28
Payer: MEDICAID

## 2024-06-28 NOTE — TELEPHONE ENCOUNTER
Pt called and stated he saw Dr. Choi in [place] and stated Dr. Choi told him he could either have surgery or get a booster shot in his spine. Pt stated he would like the Booster Shot but does not want to drive all the way back to [place] for a shot. Pt stated he wants to know could he get the shot here in office. Please advise.

## 2024-07-03 RX ORDER — SODIUM CHLORIDE 9 MG/ML
INJECTION, SOLUTION INTRAVENOUS CONTINUOUS
OUTPATIENT
Start: 2024-07-03

## 2024-07-03 RX ORDER — DIPHENHYDRAMINE HYDROCHLORIDE 50 MG/ML
12.5 INJECTION INTRAMUSCULAR; INTRAVENOUS ONCE AS NEEDED
OUTPATIENT
Start: 2024-07-03 | End: 2035-11-30

## 2024-07-03 RX ORDER — HYDROCODONE BITARTRATE AND ACETAMINOPHEN 5; 325 MG/1; MG/1
1 TABLET ORAL
OUTPATIENT
Start: 2024-07-03

## 2024-07-03 RX ORDER — FENTANYL CITRATE 50 UG/ML
25 INJECTION, SOLUTION INTRAMUSCULAR; INTRAVENOUS EVERY 5 MIN PRN
OUTPATIENT
Start: 2024-07-03

## 2024-07-03 RX ORDER — ONDANSETRON HYDROCHLORIDE 2 MG/ML
4 INJECTION, SOLUTION INTRAVENOUS DAILY PRN
OUTPATIENT
Start: 2024-07-03

## 2024-07-03 RX ORDER — MEPERIDINE HYDROCHLORIDE 25 MG/ML
12.5 INJECTION INTRAMUSCULAR; INTRAVENOUS; SUBCUTANEOUS ONCE AS NEEDED
OUTPATIENT
Start: 2024-07-03 | End: 2024-07-04

## 2024-07-03 NOTE — TELEPHONE ENCOUNTER
Called pt and relayed provider's message below. Pt verbalized understanding with no further questions or concerns at this time.

## 2024-07-08 RX ORDER — CYCLOPENTOLATE HYDROCHLORIDE 10 MG/ML
1 SOLUTION/ DROPS OPHTHALMIC
OUTPATIENT
Start: 2024-07-08 | End: 2024-07-08

## 2024-07-08 RX ORDER — TROPICAMIDE 10 MG/ML
1 SOLUTION/ DROPS OPHTHALMIC
OUTPATIENT
Start: 2024-07-08 | End: 2024-07-08

## 2024-07-08 RX ORDER — BRIMONIDINE TARTRATE 1.5 MG/ML
1 SOLUTION/ DROPS OPHTHALMIC ONCE
OUTPATIENT
Start: 2024-07-08 | End: 2024-07-08

## 2024-07-08 RX ORDER — PHENYLEPHRINE HYDROCHLORIDE 25 MG/ML
1 SOLUTION/ DROPS OPHTHALMIC
OUTPATIENT
Start: 2024-07-08 | End: 2024-07-08

## 2024-07-08 RX ORDER — DICLOFENAC SODIUM 1 MG/ML
1 SOLUTION/ DROPS OPHTHALMIC
OUTPATIENT
Start: 2024-07-08 | End: 2024-07-08

## 2024-07-08 RX ORDER — PROPARACAINE HYDROCHLORIDE 5 MG/ML
1 SOLUTION/ DROPS OPHTHALMIC
OUTPATIENT
Start: 2024-07-08 | End: 2024-07-08

## 2024-07-09 ENCOUNTER — HOSPITAL ENCOUNTER (OUTPATIENT)
Facility: HOSPITAL | Age: 64
Discharge: HOME | End: 2024-07-09
Attending: OPHTHALMOLOGY | Admitting: OPHTHALMOLOGY
Payer: MEDICAID

## 2024-07-09 VITALS
OXYGEN SATURATION: 96 % | RESPIRATION RATE: 20 BRPM | DIASTOLIC BLOOD PRESSURE: 78 MMHG | SYSTOLIC BLOOD PRESSURE: 127 MMHG | HEIGHT: 69 IN | WEIGHT: 210.13 LBS | TEMPERATURE: 98 F | BODY MASS INDEX: 31.12 KG/M2 | HEART RATE: 109 BPM

## 2024-07-09 DIAGNOSIS — H25.11 NUCLEAR SCLEROTIC CATARACT OF RIGHT EYE: ICD-10-CM

## 2024-07-09 DIAGNOSIS — Z01.818 PRE-PROCEDURAL EXAMINATION: ICD-10-CM

## 2024-07-09 PROCEDURE — 25000003 PHARM REV CODE 250: Performed by: NURSE ANESTHETIST, CERTIFIED REGISTERED

## 2024-07-09 RX ORDER — SODIUM CHLORIDE 9 MG/ML
INJECTION, SOLUTION INTRAVENOUS CONTINUOUS
Status: DISCONTINUED | OUTPATIENT
Start: 2024-07-09 | End: 2024-07-09 | Stop reason: HOSPADM

## 2024-07-09 RX ORDER — SODIUM CITRATE AND CITRIC ACID MONOHYDRATE 334; 500 MG/5ML; MG/5ML
30 SOLUTION ORAL ONCE
Status: DISCONTINUED | OUTPATIENT
Start: 2024-07-09 | End: 2024-07-09 | Stop reason: HOSPADM

## 2024-07-09 RX ADMIN — SODIUM CHLORIDE: 9 INJECTION, SOLUTION INTRAVENOUS at 09:07

## 2024-07-09 NOTE — DISCHARGE INSTRUCTIONS
Go to Urgent Care, PCP, or ER if nausea and vomiting continues. Stay hydrated. Water, sprite or Gatorade is best. May continue regular diet. Suggest toast or saltine crackers to begin with.

## 2024-07-09 NOTE — PLAN OF CARE
Dr. Arevalo at bedside. Explained to patient that due to Nausea and vomiting will need to cancel and reschedule procedure. Patient verbalizes understanding. Patient notified that office will call them with a new surgery date and time.

## 2024-07-11 ENCOUNTER — OFFICE VISIT (OUTPATIENT)
Dept: NEUROLOGY | Facility: CLINIC | Age: 64
End: 2024-07-11
Payer: MEDICAID

## 2024-07-11 VITALS
SYSTOLIC BLOOD PRESSURE: 126 MMHG | HEART RATE: 77 BPM | WEIGHT: 217 LBS | BODY MASS INDEX: 32.14 KG/M2 | DIASTOLIC BLOOD PRESSURE: 78 MMHG | OXYGEN SATURATION: 100 % | HEIGHT: 69 IN

## 2024-07-11 DIAGNOSIS — F81.0 READING IMPAIRMENT: ICD-10-CM

## 2024-07-11 DIAGNOSIS — M51.36 DDD (DEGENERATIVE DISC DISEASE), LUMBAR: ICD-10-CM

## 2024-07-11 DIAGNOSIS — G60.8 PERIPHERAL SENSORY-MOTOR AXONAL POLYNEUROPATHY: Primary | ICD-10-CM

## 2024-07-11 PROCEDURE — 3074F SYST BP LT 130 MM HG: CPT | Mod: CPTII,,, | Performed by: NURSE PRACTITIONER

## 2024-07-11 PROCEDURE — 1160F RVW MEDS BY RX/DR IN RCRD: CPT | Mod: CPTII,,, | Performed by: NURSE PRACTITIONER

## 2024-07-11 PROCEDURE — 99214 OFFICE O/P EST MOD 30 MIN: CPT | Mod: S$PBB,,, | Performed by: NURSE PRACTITIONER

## 2024-07-11 PROCEDURE — 3008F BODY MASS INDEX DOCD: CPT | Mod: CPTII,,, | Performed by: NURSE PRACTITIONER

## 2024-07-11 PROCEDURE — 99213 OFFICE O/P EST LOW 20 MIN: CPT | Mod: PBBFAC | Performed by: NURSE PRACTITIONER

## 2024-07-11 PROCEDURE — 1159F MED LIST DOCD IN RCRD: CPT | Mod: CPTII,,, | Performed by: NURSE PRACTITIONER

## 2024-07-11 PROCEDURE — 3078F DIAST BP <80 MM HG: CPT | Mod: CPTII,,, | Performed by: NURSE PRACTITIONER

## 2024-07-11 RX ORDER — PREGABALIN 100 MG/1
100 CAPSULE ORAL 3 TIMES DAILY
Qty: 90 CAPSULE | Refills: 1 | Status: SHIPPED | OUTPATIENT
Start: 2024-07-11 | End: 2024-09-09

## 2024-07-11 RX ORDER — KETOROLAC TROMETHAMINE 5 MG/ML
1 SOLUTION OPHTHALMIC 4 TIMES DAILY
COMMUNITY
Start: 2024-07-02 | End: 2024-07-11

## 2024-07-11 RX ORDER — PREDNISOLONE ACETATE 10 MG/ML
1 SUSPENSION/ DROPS OPHTHALMIC 4 TIMES DAILY
COMMUNITY
Start: 2024-07-01 | End: 2024-07-11

## 2024-07-11 RX ORDER — OFLOXACIN 3 MG/ML
1 SOLUTION/ DROPS OPHTHALMIC 4 TIMES DAILY
COMMUNITY
Start: 2024-07-01 | End: 2024-07-11

## 2024-07-11 NOTE — PROGRESS NOTES
[facility] Neurology Follow Up Office Visit Note    Initial Visit Date: 1/10/2023  Last Visit Date: 5/21/2024  Current Visit Date:  07/11/2024    Chief Complaint:     Chief Complaint   Patient presents with    Follow-up     Patient c/o lower back pain, difficulty with balance, and muscle spasms.      History of Present Illness:      This is 63 y.o. male with history of chronic lower back pain, alcohol dependence who was referred for bilateral lower extremity numbness for 1 + year.  During that visit, Lyrica was increased to 50mg PO TID. MRI C-spine and T-spine were ordered.     Interim Hx:  Today, Pt taking Lyrica 50mg TID as prescribed and denies improvement w/burning/tingling/numbness feeling to feet w/Lyrica. Improved w/elevating legs. Worsens with activity. Interferes w/ADLs. Drinks <1-2 beers weekly. Decreased cigar smoking to 1/week. Not employed at this time. Continues w/R lower back pain that he has had for years, but more consistent for last few months. Pain scale 9/10, described as aching. Interferes w/ADLs. Worse with prolonged sitting, improves w/standing. Has been evaluated by Dr. Elaine, does not wish to have surgery.    Presenting Hx:  Patient states that he had bilateral LBP radiating down bilateral LE, L>R. He reports decreased sensation in bilateral feet, left worse than right. Denied any trip and falls. Reports bilateral knee pain, left > right. Drinks alcohol throughout the week, drinking around a case of beer every 2 weeks. Quit smoking in 11/2019. Denied any saddle anesthesia. Denied any urinary incontinence. 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   Medications:     Current Outpatient Medications on File Prior to Visit   Medication Sig Dispense Refill    amLODIPine (NORVASC) 10 MG tablet Take 1 tablet (10 mg total) by mouth once daily. 90 tablet 1    mirabegron (MYRBETRIQ) 25 mg Tb24 ER tablet Take 1 tablet (25 mg total) by mouth once daily. 90 tablet 3    pantoprazole (PROTONIX) 40 MG  tablet Take 1 tablet (40 mg total) by mouth once daily. 30 tablet 11    potassium chloride (MICRO-K) 10 MEQ CpSR Take 1 capsule (10 mEq total) by mouth once daily. 90 capsule 1    propylene glycol/peg 400/PF (SYSTANE, PF, OPHT) Apply to eye.      tamsulosin (FLOMAX) 0.4 mg Cap Take 1 capsule (0.4 mg total) by mouth once daily. 90 capsule 3    [DISCONTINUED] pregabalin (LYRICA) 50 MG capsule Take 1 capsule (50 mg total) by mouth 3 (three) times daily. 90 capsule 1    [DISCONTINUED] ketorolac 0.5% (ACULAR) 0.5 % Drop Place 1 drop into the right eye 4 (four) times daily. (Patient not taking: Reported on 7/11/2024)      [DISCONTINUED] ofloxacin (OCUFLOX) 0.3 % ophthalmic solution Place 1 drop into the right eye 4 (four) times daily. (Patient not taking: Reported on 7/11/2024)      [DISCONTINUED] polyethylene glycol (MOVIPREP) 100-7.5-2.691 gram solution Take 2,000 mLs by mouth As instructed (Take as directed per instructions provided by clinic). Take as directed prior to colonoscopy (Patient not taking: Reported on 2/19/2024) 1 kit 0    [DISCONTINUED] prednisoLONE acetate (PRED FORTE) 1 % DrpS Place 1 drop into the right eye 4 (four) times daily. (Patient not taking: Reported on 7/11/2024)       Current Facility-Administered Medications on File Prior to Visit   Medication Dose Route Frequency Provider Last Rate Last Admin    lactated ringers infusion   Intravenous Continuous Deanna Gaona MD 10 mL/hr at 10/30/23 0808 New Bag at 10/30/23 0808    LIDOcaine (PF) 10 mg/ml (1%) injection 10 mg  1 mL Intradermal Once Marina De La Torre FNP           Labs:     Results for orders placed or performed in visit on 06/07/24   Folate   Result Value Ref Range    Folate Level 7.0 7.0 - 31.4 ng/mL   Ferritin   Result Value Ref Range    Ferritin Level 687.98 (H) 21.81 - 274.66 ng/mL   Iron and TIBC   Result Value Ref Range    Iron Binding Capacity Unsaturated 150 69 - 240 ug/dL    Iron Level 75 65 - 175 ug/dL    Transferrin  206 163 - 344 mg/dL    Iron Binding Capacity Total 225 (L) 250 - 450 ug/dL    Iron Saturation 33 20 - 50 %   Vitamin B12   Result Value Ref Range    Vitamin B12 651 213 - 816 pg/mL   PSA, Total (Diagnostic)   Result Value Ref Range    Prostate Specific Antigen 0.56 <=4.00 ng/mL   Comprehensive Metabolic Panel   Result Value Ref Range    Sodium 141 136 - 145 mmol/L    Potassium 3.4 (L) 3.5 - 5.1 mmol/L    Chloride 106 98 - 107 mmol/L    CO2 26 23 - 31 mmol/L    Glucose 99 82 - 115 mg/dL    Blood Urea Nitrogen 6.5 (L) 8.4 - 25.7 mg/dL    Creatinine 0.80 0.73 - 1.18 mg/dL    Calcium 9.3 8.8 - 10.0 mg/dL    Protein Total 7.5 5.8 - 7.6 gm/dL    Albumin 3.4 3.4 - 4.8 g/dL    Globulin 4.1 (H) 2.4 - 3.5 gm/dL    Albumin/Globulin Ratio 0.8 (L) 1.1 - 2.0 ratio    Bilirubin Total 0.4 <=1.5 mg/dL    ALP 55 40 - 150 unit/L    ALT 7 0 - 55 unit/L    AST 17 5 - 34 unit/L    eGFR >60 mL/min/1.73/m2    Anion Gap 9.0 mEq/L    BUN/Creatinine Ratio 8    CBC with Differential   Result Value Ref Range    WBC 6.73 4.50 - 11.50 x10(3)/mcL    RBC 4.34 (L) 4.70 - 6.10 x10(6)/mcL    Hgb 13.3 (L) 14.0 - 18.0 g/dL    Hct 38.4 (L) 42.0 - 52.0 %    MCV 88.5 80.0 - 94.0 fL    MCH 30.6 27.0 - 31.0 pg    MCHC 34.6 33.0 - 36.0 g/dL    RDW 12.7 11.5 - 17.0 %    Platelet 313 130 - 400 x10(3)/mcL    MPV 10.9 (H) 7.4 - 10.4 fL    Neut % 46.5 %    Lymph % 37.3 %    Mono % 9.2 %    Eos % 4.0 %    Basophil % 0.6 %    Lymph # 2.51 0.6 - 4.6 x10(3)/mcL    Neut # 3.13 2.1 - 9.2 x10(3)/mcL    Mono # 0.62 0.1 - 1.3 x10(3)/mcL    Eos # 0.27 0 - 0.9 x10(3)/mcL    Baso # 0.04 <=0.2 x10(3)/mcL    IG# 0.16 (H) 0 - 0.04 x10(3)/mcL    IG% 2.4 %    NRBC% 0.0 %     Studies:      - MRI C-spine 2/29/2024: mild degenerative barrowing of spinal canal at C3-5 and C6-7; multilevel neural foraminal stenosis noted    - MRI T-spine 2/29/2024: no definite convincing cord signal abnormality; no significant spinal canal or neural foraminal stenosis    - MRI L-spine w/out Kyree  8/28/2023 - multilevel DDD w/facet degenerative changes w/multilevel canal and foraminal stenosis    - CT L-spine w/out Kyree 7/14/2023 - multilevel degenerative changes    - NCS/EMG bilateral LE 8/12/2022 by Dr. Marck London:  I have reviewed the study independently and with the patient. Incomplete study.  Also, no reference range.  No conduction velocity.  Latency and amplitude of SNAP and CMAP mostly symmetric without reference range.  No F-waves.     Review of Systems:     Review of Systems   All other systems reviewed and are negative.    As per HPI    Physical Exams:     Vitals:    07/11/24 1110   BP: 126/78   Pulse:        Physical Exam  Vitals and nursing note reviewed.   Constitutional:       Appearance: Normal appearance.   HENT:      Head: Normocephalic and atraumatic.      Right Ear: External ear normal.      Left Ear: External ear normal.      Nose: Nose normal.      Mouth/Throat:      Mouth: Mucous membranes are moist.      Pharynx: Oropharynx is clear.   Eyes:      Conjunctiva/sclera: Conjunctivae normal.   Cardiovascular:      Rate and Rhythm: Normal rate and regular rhythm.      Pulses: Normal pulses.   Pulmonary:      Effort: Pulmonary effort is normal.   Abdominal:      General: There is no distension.      Tenderness: There is no guarding.   Musculoskeletal:         General: Normal range of motion.      Cervical back: Normal range of motion.      Comments: Painful lower back   Skin:     General: Skin is warm and dry.      Coloration: Skin is not jaundiced.      Findings: No lesion or rash.   Neurological:      Mental Status: He is alert.   Psychiatric:         Mood and Affect: Mood normal.     Comprehensive Neurological Exam:  Mental Status: Alert Oriented to Self, Date, and Place. Comprehension wnl. No dysarthria.   CN II - XII: DUARTE, No APD, VFFC, No ptosis OU, EOMI without nystagmus, LT/Temp symmetric in CN V1-3 distribution, Hearing grossly intact, Face Symmetric, Tongue and Uvula midline,  Trapezius symmetric bilateral.   Motor: tone and bulk wnl throughout, no abnormal involuntary or voluntary movements, 5/5 to confrontation, Fine finger movements wnl b/l, No pronator drift.   Sensory: LT, Proprioception, Vibration, PP, Temp symmetric subjectively decreased in bilateral LE throughout.   Reflexes: 2+ throughout except for 1+ in bilateral AJ, plantar reflexes downward bilateral   Cerebellar: FNF wnl b/l, RAHM wnl b/l.  Romberg: Negative  Gait: antalgic gait, stooped posture, utilizing cane    Assessment:     This is 63 y.o. male with history of chronic lower back pain, alcohol dependence who was referred for bilateral lower extremity numbness. NCS/EMG results were inconclusive due to missing components based on report. Exam and history more consistent with lumbar radiculopathy. Neuropathy not better on increased Lyrica 50mg PO TID. MRI C-spine and T-spine completed and has been evaluated by Dr. Elaine. Pt would  like to seek injections.    Problem List Items Addressed This Visit          Neuro    Peripheral sensory-motor axonal polyneuropathy - Primary    Relevant Medications    pregabalin (LYRICA) 100 MG capsule    DDD (degenerative disc disease), lumbar    Reading impairment       Plan:     [] increase Lyrica to 100mg PO TID  [] fall precautions  [] Pt to seek injections w/Dr. Elaine    RTC 4 Months     I have explained the treatment plan, diagnosis, and prognosis to patient. All questions are answered to the best of my knowledge.     Face to face time 30 minutes, including documentation, chart review, counseling, education, review of test results, relevant medical records, and coordination of care.     I have explained the treatment plan, diagnosis, and prognosis to patient. All questions are answered to the best of my knowledge.     07/11/2024

## 2024-07-19 ENCOUNTER — TELEPHONE (OUTPATIENT)
Dept: INTERNAL MEDICINE | Facility: CLINIC | Age: 64
End: 2024-07-19

## 2024-07-19 DIAGNOSIS — Z01.818 PRE-OP TESTING: Primary | ICD-10-CM

## 2024-07-19 NOTE — TELEPHONE ENCOUNTER
Received surgery clearance form from [facility]. Pt is scheduled for Cataract Extraction w/ IOL implant on 8/13/24  .     Please schedule pt for surgery clearance appt . Form given to Jenna/NESHA .

## 2024-07-22 ENCOUNTER — TELEPHONE (OUTPATIENT)
Dept: GASTROENTEROLOGY | Facility: CLINIC | Age: 64
End: 2024-07-22
Payer: MEDICAID

## 2024-07-22 NOTE — TELEPHONE ENCOUNTER
Spoke with pharmacy- confirmed pt has multiple refills of pantoprazole.  Advised pt he has refills of Pantoprazole. Verbalized understanding

## 2024-07-22 NOTE — TELEPHONE ENCOUNTER
----- Message from Maggie Calix MA sent at 7/22/2024  9:35 AM CDT -----  Regarding: FW: Medciation Refill    ----- Message -----  From: Baylee Hayes  Sent: 7/22/2024   9:33 AM CDT  To: [facility] Neurology Clinical Support Staff  Subject: Medciation Refill                                Patient called and stated he needs a refill on the medication pantoprazole (PROTONIX) 40 MG tablet    Pharmacy: [facility]/pharmacy #[id number] - [place] - [address] AT [facility]    Please Advise

## 2024-08-02 ENCOUNTER — OFFICE VISIT (OUTPATIENT)
Dept: INTERNAL MEDICINE | Facility: CLINIC | Age: 64
End: 2024-08-02
Payer: MEDICAID

## 2024-08-02 VITALS
RESPIRATION RATE: 18 BRPM | DIASTOLIC BLOOD PRESSURE: 69 MMHG | BODY MASS INDEX: 31.62 KG/M2 | WEIGHT: 213.5 LBS | HEART RATE: 72 BPM | HEIGHT: 69 IN | SYSTOLIC BLOOD PRESSURE: 112 MMHG | TEMPERATURE: 99 F

## 2024-08-02 DIAGNOSIS — E87.6 HYPOKALEMIA: ICD-10-CM

## 2024-08-02 DIAGNOSIS — N52.9 ERECTILE DYSFUNCTION, UNSPECIFIED ERECTILE DYSFUNCTION TYPE: Primary | ICD-10-CM

## 2024-08-02 DIAGNOSIS — I10 HYPERTENSION, UNSPECIFIED TYPE: ICD-10-CM

## 2024-08-02 DIAGNOSIS — M48.061 SPINAL STENOSIS OF LUMBAR REGION WITHOUT NEUROGENIC CLAUDICATION: ICD-10-CM

## 2024-08-02 PROCEDURE — 99214 OFFICE O/P EST MOD 30 MIN: CPT | Mod: PBBFAC | Performed by: NURSE PRACTITIONER

## 2024-08-02 RX ORDER — AMLODIPINE BESYLATE 10 MG/1
10 TABLET ORAL DAILY
Qty: 90 TABLET | Refills: 3 | Status: SHIPPED | OUTPATIENT
Start: 2024-08-02 | End: 2025-07-28

## 2024-08-02 RX ORDER — POTASSIUM CHLORIDE 20 MEQ/1
20 TABLET, EXTENDED RELEASE ORAL DAILY
Qty: 90 TABLET | Refills: 1 | Status: SHIPPED | OUTPATIENT
Start: 2024-08-02

## 2024-08-02 NOTE — PROGRESS NOTES
Internal Medicine Clinic  Nicolás Perez DNP     Patient ID: [id number]     Chief Complaint: Hypertension (Needs refills)      HPI:     Arcadio Scott is a 63 y.o. male here today for follow-up with PCP.  Medical diagnosis include polyneuropathy, peripheral sensory motor axonal polyneuropathy (follows Neurology), lumbar degenerative disc disease, reading impairment, lumbar stenosis, alcohol abuse, hypertension, H, anemia, transaminitis, and history of GERD.      He would like to discuss medication for erectile dysfunction.  Denies any chest pain or shortness a breath symptoms.    Health Maintenance         Date Due Completion Date    TETANUS VACCINE 10/09/2024 (Originally 11/3/1978) ---    Shingles Vaccine (1 of 2) 10/09/2024 (Originally 11/3/2011) ---    Influenza Vaccine (1) 09/01/2024 10/9/2023 (Declined)    Override on 10/9/2023: Declined (Patient refused)    PROSTATE-SPECIFIC ANTIGEN 06/07/2025 6/7/2024    Hemoglobin A1c (Diabetic Prevention Screening) 10/09/2026 10/9/2023    Lipid Panel 10/09/2028 10/9/2023    Colorectal Cancer Screening 10/30/2033 10/30/2023            Past Medical History:   Diagnosis Date    GERD (gastroesophageal reflux disease)     Hypertension     Neuropathy     Unspecified glaucoma         Past Surgical History:   Procedure Laterality Date    COLONOSCOPY W/ POLYPECTOMY  01/28/2022    COLONOSCOPY, WITH POLYPECTOMY USING SNARE N/A 10/30/2023    Procedure: COLONOSCOPY, WITH POLYPECTOMY USING SNARE;  Surgeon: Ngoc Fenton MD;  Location: [facility] ENDOSCOPY;  Service: Gastroenterology;  Laterality: N/A;    CYST REMOVAL      Facial    EGD, WITH CLOSED BIOPSY N/A 10/30/2023    Procedure: EGD;  Surgeon: Ngoc Fenton MD;  Location: [facility] ENDOSCOPY;  Service: Gastroenterology;  Laterality: N/A;    SURGICAL REMOVAL OF LESION OF FACE Bilateral 07/18/2022    Procedure: EXCISION, LESION, FACE AND NECK;  Surgeon: Marcelo Capps MD;  Location: [facility] OR;  Service: ENT;  Laterality:  Bilateral;        Social History     Tobacco Use    Smoking status: Former     Types: Cigars, Cigarettes     Passive exposure: Current    Smokeless tobacco: Never    Tobacco comments:     Quit cigarettes in 2019, smokes cigars 1-2 times a week   Substance and Sexual Activity    Alcohol use: Not Currently    Drug use: Never    Sexual activity: Not Currently        Current Outpatient Medications   Medication Instructions    amLODIPine (NORVASC) 10 mg, Oral, Daily    mirabegron (MYRBETRIQ) 25 mg, Oral, Daily    pantoprazole (PROTONIX) 40 mg, Oral, Daily    potassium chloride SA (K-DUR,KLOR-CON) 20 MEQ tablet 20 mEq, Oral, Daily    pregabalin (LYRICA) 100 mg, Oral, 3 times daily    propylene glycol/peg 400/PF (SYSTANE, PF, OPHT) Ophthalmic    tamsulosin (FLOMAX) 0.4 mg, Oral, Daily       Review of patient's allergies indicates:  No Known Allergies     Patient Care Team:  Nicolás Perez FNP as PCP - General (Family Medicine)     Subjective:     Review of Systems   Constitutional:  Negative for appetite change, chills, diaphoresis and fever.   HENT:  Negative for ear pain, sinus pain and sore throat.    Eyes:  Negative for pain and visual disturbance.   Respiratory:  Negative for cough, shortness of breath and wheezing.    Cardiovascular:  Negative for chest pain, palpitations and leg swelling.   Gastrointestinal:  Negative for abdominal pain, blood in stool, diarrhea, nausea and vomiting.   Endocrine: Negative for cold intolerance.   Genitourinary:  Negative for difficulty urinating, dysuria, frequency and hematuria.   Musculoskeletal:  Positive for back pain. Negative for arthralgias, joint swelling and myalgias.   Skin:  Negative for color change and rash.   Allergic/Immunologic: Negative.    Neurological:  Negative for dizziness, syncope and light-headedness.   Hematological: Negative.    Psychiatric/Behavioral: Negative.  Negative for dysphoric mood and suicidal ideas. The patient is not nervous/anxious.    All  "other systems reviewed and are negative.      12 point review of systems conducted, negative except as stated in the history of present illness. See HPI for details.    Objective:     Visit Vitals  /69 (BP Location: Right arm, Patient Position: Sitting, BP Method: Medium (Automatic))   Pulse 72   Temp 98.6 °F (37 °C) (Oral)   Resp 18   Ht 5' 9.02" (1.753 m)   Wt 96.8 kg (213 lb 8 oz)   BMI 31.51 kg/m²       Physical Exam  Vitals and nursing note reviewed.   Constitutional:       General: He is not in acute distress.     Appearance: He is not ill-appearing.   HENT:      Head: Normocephalic and atraumatic.      Mouth/Throat:      Mouth: Mucous membranes are moist.      Pharynx: Oropharynx is clear.   Eyes:      General: No scleral icterus.     Extraocular Movements: Extraocular movements intact.      Conjunctiva/sclera: Conjunctivae normal.      Pupils: Pupils are equal, round, and reactive to light.   Neck:      Vascular: No carotid bruit.   Cardiovascular:      Rate and Rhythm: Normal rate and regular rhythm.      Heart sounds: No murmur heard.     No friction rub. No gallop.   Pulmonary:      Effort: Pulmonary effort is normal. No respiratory distress.      Breath sounds: Normal breath sounds. No wheezing, rhonchi or rales.   Abdominal:      General: Abdomen is flat. Bowel sounds are normal. There is no distension.      Palpations: Abdomen is soft. There is no mass.      Tenderness: There is no abdominal tenderness.   Musculoskeletal:      Cervical back: Normal range of motion and neck supple.      Lumbar back: Tenderness present. Decreased range of motion.      Comments: Ambulates with a cane   Skin:     General: Skin is warm and dry.   Neurological:      General: No focal deficit present.      Mental Status: He is alert.   Psychiatric:         Mood and Affect: Mood normal.         Labs Reviewed:     Chemistry:  Lab Results   Component Value Date     06/07/2024    K 3.4 (L) 06/07/2024    BUN 6.5 (L) " 06/07/2024    CREATININE 0.80 06/07/2024    EGFRNORACEVR >60 06/07/2024    GLUCOSE 99 06/07/2024    CALCIUM 9.3 06/07/2024    ALKPHOS 55 06/07/2024    LABPROT 7.5 06/07/2024    ALBUMIN 3.4 06/07/2024    BILIDIR 0.3 02/14/2022    IBILI 0.60 02/14/2022    AST 17 06/07/2024    ALT 7 06/07/2024    MG 1.70 09/29/2023    PHOS 3.0 09/29/2023    TSH 0.497 10/09/2023    [id number] 0.94 04/12/2023    PSA 0.56 06/07/2024        Lab Results   Component Value Date    HGBA1C 5.2 10/09/2023        Hematology:  Lab Results   Component Value Date    WBC 6.73 06/07/2024    HGB 13.3 (L) 06/07/2024    HCT 38.4 (L) 06/07/2024     06/07/2024       Lipid Panel:  Lab Results   Component Value Date    CHOL 173 10/09/2023    HDL 52 10/09/2023    .00 10/09/2023    TRIG 79 10/09/2023    TOTALCHOLEST 3 10/09/2023        Urine:  Lab Results   Component Value Date    APPEARANCEUA Turbid (A) 09/27/2023    SGUA 1.027 09/27/2023    PROTEINUA 3+ (A) 09/27/2023    KETONESUA 3+ (A) 09/27/2023    LEUKOCYTESUR 25 (A) 09/27/2023    RBCUA 6-10 (A) 09/27/2023    WBCUA 6-10 (A) 09/27/2023    BACTERIA Trace (A) 09/27/2023    SQEPUA Occ (A) 09/27/2023    HYALINECASTS >20 (A) 09/27/2023        Assessment:       ICD-10-CM ICD-9-CM   1. Erectile dysfunction, unspecified erectile dysfunction type  N52.9 607.84   2. Hypertension, unspecified type  I10 401.9   3. Hypokalemia  E87.6 276.8   4. Spinal stenosis of lumbar region without neurogenic claudication  M48.061 724.02        Plan:     1. Erectile dysfunction, unspecified erectile dysfunction type  Assessment & Plan:  He reports new symptoms of erectile dysfunction has been going on for the past year.  No known history of CAD or stents.  We discussed we should ensure his heart is healthy prior to prescribing a medication to address erectile dysfunction.  Would like to rule out any ischemic changes.  He may have erectile dysfunction related to his lumbar stenosis as well.  He agrees to have a stress  test to rule out any ischemic changes and then we will follow-up in 1 month to discuss treatment options and review those results.    Orders:  -     Nuclear Stress - Cardiology Interpreted; Future; Expected date: 08/09/2024    2. Hypertension, unspecified type  Overview:  Amlodipine 10 mg once daily    Assessment & Plan:  Goal BP < 140/90, best goal is BP <130/80 consistently   Reduce the amount of salt in your diet, follow a 2 gm sodium, DASH diet daily            Lose weight if you are overweight or have obesity  Avoid drinking too much alcohol  Stop smoking  Exercise at least 30 minutes per day most days of the week      Orders:  -     amLODIPine (NORVASC) 10 MG tablet; Take 1 tablet (10 mg total) by mouth once daily.  Dispense: 90 tablet; Refill: 3  -     Cancel: Urinalysis; Future; Expected date: 02/02/2025  -     Nuclear Stress - Cardiology Interpreted; Future; Expected date: 08/09/2024    3. Hypokalemia  Assessment & Plan:  Take potassium chloride as prescribed  Avoid ETOH  Increase potassium to 20 mEq daily, we will recheck at follow-up in 1 month    Orders:  -     potassium chloride SA (K-DUR,KLOR-CON) 20 MEQ tablet; Take 1 tablet (20 mEq total) by mouth once daily.  Dispense: 90 tablet; Refill: 1    4. Spinal stenosis of lumbar region without neurogenic claudication  Assessment & Plan:  Reviewed recent MRI and x-rays of cervical, thoracic, and lumbar spine.  He is following with Neurosurgery, reports has upcoming appointment in [place], has a preop appointment already scheduled.  We will request records so we know if any procedures we will be occurring and what procedure will be occurring.  Continue follow-up with Neurology and Neurosurgery, fall precautions advised.  He denies any bowel/bladder dysfunction.  Walking with a cane.    Today he reports he is going to have lumbar epidural steroid injections in the next couple of weeks and he is taking Lyrica.               Follow up in about 1 month (around  9/2/2024). In addition to their scheduled follow up, the patient has also been instructed to follow up on as needed basis.     Future Appointments   Date Time Provider Department Center   8/19/2024  8:00 AM Chaitanya Rodriguez NP [facility] [place]   8/29/2024 11:30 AM Felicita Ramirez, KENDY [facility] NEURO [place]   9/5/2024 10:00 AM Nicolás Perez FNP [facility]   10/17/2024  9:30 AM Felicita Ramirez ANP [facility] NEURO [place]   12/17/2024  1:20 PM [facility]        AN [place]

## 2024-08-02 NOTE — ASSESSMENT & PLAN NOTE
He reports new symptoms of erectile dysfunction has been going on for the past year.  No known history of CAD or stents.  We discussed we should ensure his heart is healthy prior to prescribing a medication to address erectile dysfunction.  Would like to rule out any ischemic changes.  He may have erectile dysfunction related to his lumbar stenosis as well.  He agrees to have a stress test to rule out any ischemic changes and then we will follow-up in 1 month to discuss treatment options and review those results.

## 2024-08-02 NOTE — ASSESSMENT & PLAN NOTE
Reviewed recent MRI and x-rays of cervical, thoracic, and lumbar spine.  He is following with Neurosurgery, reports has upcoming appointment in [place], has a preop appointment already scheduled.  We will request records so we know if any procedures we will be occurring and what procedure will be occurring.  Continue follow-up with Neurology and Neurosurgery, fall precautions advised.  He denies any bowel/bladder dysfunction.  Walking with a cane.    Today he reports he is going to have lumbar epidural steroid injections in the next couple of weeks and he is taking Lyrica.

## 2024-08-02 NOTE — ASSESSMENT & PLAN NOTE
Take potassium chloride as prescribed  Avoid ETOH  Increase potassium to 20 mEq daily, we will recheck at follow-up in 1 month

## 2024-08-05 ENCOUNTER — TELEPHONE (OUTPATIENT)
Dept: NEUROLOGY | Facility: CLINIC | Age: 64
End: 2024-08-05
Payer: MEDICAID

## 2024-08-05 ENCOUNTER — ANESTHESIA EVENT (OUTPATIENT)
Dept: SURGERY | Facility: HOSPITAL | Age: 64
End: 2024-08-05
Payer: MEDICAID

## 2024-08-05 RX ORDER — HYDROCORTISONE 1 %
CREAM (GRAM) TOPICAL 2 TIMES DAILY
Qty: 30 G | Refills: 0 | Status: SHIPPED | OUTPATIENT
Start: 2024-08-05

## 2024-08-09 ENCOUNTER — OFFICE VISIT (OUTPATIENT)
Dept: INTERNAL MEDICINE | Facility: CLINIC | Age: 64
End: 2024-08-09
Payer: MEDICAID

## 2024-08-09 VITALS
RESPIRATION RATE: 16 BRPM | BODY MASS INDEX: 33.47 KG/M2 | TEMPERATURE: 98 F | HEART RATE: 73 BPM | DIASTOLIC BLOOD PRESSURE: 64 MMHG | HEIGHT: 69 IN | SYSTOLIC BLOOD PRESSURE: 110 MMHG | OXYGEN SATURATION: 97 % | WEIGHT: 226 LBS

## 2024-08-09 DIAGNOSIS — Z01.818 PREOP EXAMINATION: ICD-10-CM

## 2024-08-09 DIAGNOSIS — D64.9 ANEMIA, UNSPECIFIED TYPE: ICD-10-CM

## 2024-08-09 DIAGNOSIS — I10 HYPERTENSION, UNSPECIFIED TYPE: Primary | ICD-10-CM

## 2024-08-09 DIAGNOSIS — G62.9 PERIPHERAL POLYNEUROPATHY: ICD-10-CM

## 2024-08-09 PROCEDURE — 99213 OFFICE O/P EST LOW 20 MIN: CPT | Mod: PBBFAC | Performed by: INTERNAL MEDICINE

## 2024-08-09 NOTE — ANESTHESIA PREPROCEDURE EVALUATION
08/09/2024  Arcadio Scott is a 63 y.o., male.      Pre-op Assessment    I have reviewed the Patient Summary Reports.     I have reviewed the Nursing Notes. I have reviewed the NPO Status.   I have reviewed the Medications.     Review of Systems  Anesthesia Hx:  No problems with previous Anesthesia   History of prior surgery of interest to airway management or planning:  Previous anesthesia: General        Denies Family Hx of Anesthesia complications.    Denies Personal Hx of Anesthesia complications.                    Hematology/Oncology:  Hematology Normal   Oncology Normal                                   EENT/Dental:   Right cataract Eyes: Visual Impairment   Has Right Catarract                  Cardiovascular:     Hypertension           hyperlipidemia                       Hypertension         Pulmonary:  Pulmonary Normal                       Renal/:  Renal/ Normal                 Hepatic/GI:     GERD             Musculoskeletal:  Arthritis               Neurological:    Neuromuscular Disease,                                 Neuromuscular Disease   Endocrine:        Obesity / BMI > 30  Psych:  Psychiatric History         Psychotic Disorder.        Physical Exam  General: Well nourished, Cooperative, Alert and Oriented    Airway:  Mallampati: II   Mouth Opening: Normal  TM Distance: Normal  Tongue: Normal  Neck ROM: Normal ROM    Dental:  Periodontal disease    Chest/Lungs:  Clear to auscultation, Normal Respiratory Rate    Heart:  Rate: Normal  Rhythm: Regular Rhythm      Anesthesia Plan  Type of Anesthesia, risks & benefits discussed:    Anesthesia Type: Gen Supraglottic Airway  Intra-op Monitoring Plan: Standard ASA Monitors  Post Op Pain Control Plan: multimodal analgesia  Induction:  IV  Airway Plan: , Post-Induction  Informed Consent: Informed consent signed with the Patient and all parties  understand the risks and agree with anesthesia plan.  All questions answered. Patient consented to blood products? No  ASA Score: 2  Day of Surgery Review of History & Physical: H&P Update referred to the surgeon/provider.I have interviewed and examined the patient. I have reviewed the patient's H&P dated: 8/13/24. There are no significant changes.     Ready For Surgery From Anesthesia Perspective.     .

## 2024-08-12 RX ORDER — BRIMONIDINE TARTRATE 1.5 MG/ML
1 SOLUTION/ DROPS OPHTHALMIC ONCE
OUTPATIENT
Start: 2024-08-12 | End: 2024-08-12

## 2024-08-12 NOTE — DISCHARGE INSTRUCTIONS
**IMPORTANT**    Start eye drops at home TODAY:  Ofloxacin 4x/day in surgery eye for one week, then discontinue.  Diclofinac/Ketorolac 4x/day in surgery eye for one week, then 2x/day until it runs out.  Prednisolone 4x/day for one week, then 2x/day until it runs out.     **DO NOT TOUCH THE TIP OF THE BOTTLE WITH YOUR FINGERS OR TO YOUR FACE OR EYE**    It is common to have mild discomfort after surgery, often described as a little headache, scratchy feeling or feeling like something in the eye.  However, if you have Severe Pain that is not  helped by your usual headache or pain medications such as (Tylenol/Acetaminophen) (NO ASPIRIN), Please call our OFFICE at [phone].      2. You may eat a light meal, get some rest and take it easy the evening after your surgery.  You may read, watch TV, play cards, etc.  No Strenous Exercise for the next week.    3. Use the Eye Shield at night or whenever you sleep for the next two weeks.    4.  Showers or baths are permitted starting the day after surgery.    5.  DO NOT lift anything heavier than 15 - 20 pounds or bend below your waist for the next 7 days.    6. No cooking or cleaning for the next 7 days.    7. Dilated pupil may have blurry/cloudy vision caused by medication. If you start to experience floaters       you should sit in an upright position.    8. Wear your shades for the next 3 days to protect your eyes from the sun and bright light.    9. For your safety, a friend or family member should remain with you at least 24 hours post op or until you are more aware and alert.    10. You will need someone to drive you to your post op appointment.    11. Please avoid ni or smokey environments.    12. Possible signs of infection include fever, swelling, heat, drainage, redness. If you have any of these symptoms, please call our office.     DO NOT RUB YOUR EYE!!!

## 2024-08-13 ENCOUNTER — HOSPITAL ENCOUNTER (OUTPATIENT)
Facility: HOSPITAL | Age: 64
Discharge: HOME | End: 2024-08-13
Attending: OPHTHALMOLOGY | Admitting: OPHTHALMOLOGY
Payer: MEDICAID

## 2024-08-13 ENCOUNTER — ANESTHESIA (OUTPATIENT)
Dept: SURGERY | Facility: HOSPITAL | Age: 64
End: 2024-08-13
Payer: MEDICAID

## 2024-08-13 DIAGNOSIS — H25.11 NUCLEAR SCLEROTIC CATARACT OF RIGHT EYE: ICD-10-CM

## 2024-08-13 PROCEDURE — 36000706: Performed by: OPHTHALMOLOGY

## 2024-08-13 PROCEDURE — 71000015 HC POSTOP RECOV 1ST HR: Performed by: OPHTHALMOLOGY

## 2024-08-13 PROCEDURE — 25000003 PHARM REV CODE 250: Performed by: NURSE ANESTHETIST, CERTIFIED REGISTERED

## 2024-08-13 PROCEDURE — 71000033 HC RECOVERY, INTIAL HOUR: Performed by: OPHTHALMOLOGY

## 2024-08-13 PROCEDURE — 37000008 HC ANESTHESIA 1ST 15 MINUTES: Performed by: OPHTHALMOLOGY

## 2024-08-13 PROCEDURE — 27201423 OPTIME MED/SURG SUP & DEVICES STERILE SUPPLY: Performed by: OPHTHALMOLOGY

## 2024-08-13 PROCEDURE — 25000003 PHARM REV CODE 250: Performed by: OPHTHALMOLOGY

## 2024-08-13 PROCEDURE — V2632 POST CHMBR INTRAOCULAR LENS: HCPCS | Performed by: OPHTHALMOLOGY

## 2024-08-13 PROCEDURE — 36000707: Performed by: OPHTHALMOLOGY

## 2024-08-13 PROCEDURE — 37000009 HC ANESTHESIA EA ADD 15 MINS: Performed by: OPHTHALMOLOGY

## 2024-08-13 PROCEDURE — 63600175 PHARM REV CODE 636 W HCPCS: Performed by: NURSE ANESTHETIST, CERTIFIED REGISTERED

## 2024-08-13 DEVICE — IMPLANTABLE DEVICE: Type: IMPLANTABLE DEVICE | Site: EYE | Status: FUNCTIONAL

## 2024-08-13 RX ORDER — SODIUM CITRATE AND CITRIC ACID MONOHYDRATE 334; 500 MG/5ML; MG/5ML
30 SOLUTION ORAL ONCE
Status: COMPLETED | OUTPATIENT
Start: 2024-08-13 | End: 2024-08-13

## 2024-08-13 RX ORDER — LIDOCAINE HYDROCHLORIDE 20 MG/ML
INJECTION INTRAVENOUS
Status: DISCONTINUED | OUTPATIENT
Start: 2024-08-13 | End: 2024-08-13

## 2024-08-13 RX ORDER — POVIDONE-IODINE 5 %
SOLUTION, NON-ORAL OPHTHALMIC (EYE)
Status: DISCONTINUED | OUTPATIENT
Start: 2024-08-13 | End: 2024-08-13 | Stop reason: HOSPADM

## 2024-08-13 RX ORDER — ACETAMINOPHEN 325 MG/1
650 TABLET ORAL EVERY 4 HOURS PRN
OUTPATIENT
Start: 2024-08-13

## 2024-08-13 RX ORDER — SODIUM CHLORIDE 9 MG/ML
INJECTION, SOLUTION INTRAVENOUS CONTINUOUS
Status: DISCONTINUED | OUTPATIENT
Start: 2024-08-13 | End: 2024-08-13 | Stop reason: HOSPADM

## 2024-08-13 RX ORDER — DIPHENHYDRAMINE HYDROCHLORIDE 50 MG/ML
12.5 INJECTION INTRAMUSCULAR; INTRAVENOUS ONCE AS NEEDED
Status: DISCONTINUED | OUTPATIENT
Start: 2024-08-13 | End: 2024-08-13 | Stop reason: HOSPADM

## 2024-08-13 RX ORDER — PROPARACAINE HYDROCHLORIDE 5 MG/ML
1 SOLUTION/ DROPS OPHTHALMIC
Status: COMPLETED | OUTPATIENT
Start: 2024-08-13 | End: 2024-08-13

## 2024-08-13 RX ORDER — BRIMONIDINE TARTRATE 1.5 MG/ML
SOLUTION/ DROPS OPHTHALMIC
Status: DISCONTINUED | OUTPATIENT
Start: 2024-08-13 | End: 2024-08-13 | Stop reason: HOSPADM

## 2024-08-13 RX ORDER — GLUCAGON 1 MG
1 KIT INJECTION
Status: DISCONTINUED | OUTPATIENT
Start: 2024-08-13 | End: 2024-08-13 | Stop reason: HOSPADM

## 2024-08-13 RX ORDER — DICLOFENAC SODIUM 1 MG/ML
1 SOLUTION/ DROPS OPHTHALMIC
Status: COMPLETED | OUTPATIENT
Start: 2024-08-13 | End: 2024-08-13

## 2024-08-13 RX ORDER — EPHEDRINE SULFATE 50 MG/ML
INJECTION, SOLUTION INTRAVENOUS
Status: DISCONTINUED | OUTPATIENT
Start: 2024-08-13 | End: 2024-08-13

## 2024-08-13 RX ORDER — PHENYLEPHRINE HYDROCHLORIDE 25 MG/ML
1 SOLUTION/ DROPS OPHTHALMIC
Status: COMPLETED | OUTPATIENT
Start: 2024-08-13 | End: 2024-08-13

## 2024-08-13 RX ORDER — PREDNISOLONE ACETATE 10 MG/ML
SUSPENSION/ DROPS OPHTHALMIC
Status: DISCONTINUED | OUTPATIENT
Start: 2024-08-13 | End: 2024-08-13 | Stop reason: HOSPADM

## 2024-08-13 RX ORDER — TROPICAMIDE 10 MG/ML
1 SOLUTION/ DROPS OPHTHALMIC
Status: COMPLETED | OUTPATIENT
Start: 2024-08-13 | End: 2024-08-13

## 2024-08-13 RX ORDER — ONDANSETRON HYDROCHLORIDE 2 MG/ML
4 INJECTION, SOLUTION INTRAVENOUS DAILY PRN
Status: DISCONTINUED | OUTPATIENT
Start: 2024-08-13 | End: 2024-08-13 | Stop reason: HOSPADM

## 2024-08-13 RX ORDER — MOXIFLOXACIN 5 MG/ML
SOLUTION/ DROPS OPHTHALMIC
Status: DISCONTINUED | OUTPATIENT
Start: 2024-08-13 | End: 2024-08-13 | Stop reason: HOSPADM

## 2024-08-13 RX ORDER — PROPOFOL 10 MG/ML
VIAL (ML) INTRAVENOUS
Status: DISCONTINUED | OUTPATIENT
Start: 2024-08-13 | End: 2024-08-13

## 2024-08-13 RX ORDER — LIDOCAINE HYDROCHLORIDE 10 MG/ML
1 INJECTION, SOLUTION EPIDURAL; INFILTRATION; INTRACAUDAL; PERINEURAL ONCE
Status: COMPLETED | OUTPATIENT
Start: 2024-08-13 | End: 2024-08-13

## 2024-08-13 RX ORDER — FENTANYL CITRATE 50 UG/ML
25 INJECTION, SOLUTION INTRAMUSCULAR; INTRAVENOUS EVERY 5 MIN PRN
Status: DISCONTINUED | OUTPATIENT
Start: 2024-08-13 | End: 2024-08-13 | Stop reason: HOSPADM

## 2024-08-13 RX ORDER — PROPARACAINE HYDROCHLORIDE 5 MG/ML
SOLUTION/ DROPS OPHTHALMIC
Status: DISCONTINUED | OUTPATIENT
Start: 2024-08-13 | End: 2024-08-13 | Stop reason: HOSPADM

## 2024-08-13 RX ORDER — MEPERIDINE HYDROCHLORIDE 25 MG/ML
12.5 INJECTION INTRAMUSCULAR; INTRAVENOUS; SUBCUTANEOUS ONCE AS NEEDED
Status: DISCONTINUED | OUTPATIENT
Start: 2024-08-13 | End: 2024-08-13 | Stop reason: HOSPADM

## 2024-08-13 RX ORDER — HYDROCODONE BITARTRATE AND ACETAMINOPHEN 5; 325 MG/1; MG/1
1 TABLET ORAL
Status: DISCONTINUED | OUTPATIENT
Start: 2024-08-13 | End: 2024-08-13 | Stop reason: HOSPADM

## 2024-08-13 RX ORDER — CYCLOPENTOLATE HYDROCHLORIDE 10 MG/ML
1 SOLUTION/ DROPS OPHTHALMIC
Status: COMPLETED | OUTPATIENT
Start: 2024-08-13 | End: 2024-08-13

## 2024-08-13 RX ADMIN — SODIUM CHLORIDE: 9 INJECTION, SOLUTION INTRAVENOUS at 07:08

## 2024-08-13 RX ADMIN — EPHEDRINE SULFATE 15 MG: 50 INJECTION INTRAVENOUS at 10:08

## 2024-08-13 RX ADMIN — CYCLOPENTOLATE HYDROCHLORIDE 1 DROP: 10 SOLUTION/ DROPS OPHTHALMIC at 08:08

## 2024-08-13 RX ADMIN — TROPICAMIDE 1 DROP: 10 SOLUTION/ DROPS OPHTHALMIC at 08:08

## 2024-08-13 RX ADMIN — PHENYLEPHRINE HYDROCHLORIDE 1 DROP: 25 SOLUTION/ DROPS OPHTHALMIC at 08:08

## 2024-08-13 RX ADMIN — DICLOFENAC SODIUM 1 DROP: 1 SOLUTION/ DROPS OPHTHALMIC at 08:08

## 2024-08-13 RX ADMIN — LIDOCAINE HYDROCHLORIDE 10 MG: 10 INJECTION, SOLUTION EPIDURAL; INFILTRATION; INTRACAUDAL; PERINEURAL at 09:08

## 2024-08-13 RX ADMIN — SODIUM CITRATE AND CITRIC ACID MONOHYDRATE 30 ML: 500; 334 SOLUTION ORAL at 07:08

## 2024-08-13 RX ADMIN — LIDOCAINE HYDROCHLORIDE 50 MG: 20 INJECTION, SOLUTION INTRAVENOUS at 10:08

## 2024-08-13 RX ADMIN — PROPARACAINE HYDROCHLORIDE 1 DROP: 5 SOLUTION/ DROPS OPHTHALMIC at 08:08

## 2024-08-13 RX ADMIN — EPHEDRINE SULFATE 10 MG: 50 INJECTION INTRAVENOUS at 10:08

## 2024-08-13 RX ADMIN — PROPOFOL 150 MG: 10 INJECTION, EMULSION INTRAVENOUS at 10:08

## 2024-08-13 NOTE — DISCHARGE SUMMARY
[facility]  Discharge Note  Short Stay    Procedure(s) (LRB):  Extraction, Cataract w/ IOL (Complex) (Left)      OUTCOME: Patient tolerated treatment/procedure well without complication and is now ready for discharge.    DISPOSITION: Home or Self Care    FINAL DIAGNOSIS: pseudophakia    FOLLOWUP: In clinic    DISCHARGE INSTRUCTIONS:  No discharge procedures on file.      Clinical Reference Documents Added to Patient Instructions         Document    CATARACT REMOVAL DISCHARGE INSTRUCTIONS (ENGLISH)            TIME SPENT ON DISCHARGE: 5 minutes

## 2024-08-13 NOTE — H&P
[facility]  Ophthalmology  History and Physical    Patient Name: Arcadio Scott  MRN: [id number]  Admission Date: 8/13/2024  Hospital Length of Stay: 0 days  Attending Provider: Duncan Arevalo MD   Primary Care Physician: Nicolás Perez FNP  Principal Problem:<principal problem not specified>    Subjective:         HPI: decreased vision OD with disabling glare    Current Facility-Administered Medications on File Prior to Encounter   Medication    lactated ringers infusion    LIDOcaine (PF) 10 mg/ml (1%) injection 10 mg     Current Outpatient Medications on File Prior to Encounter   Medication Sig    mirabegron (MYRBETRIQ) 25 mg Tb24 ER tablet Take 1 tablet (25 mg total) by mouth once daily.    pregabalin (LYRICA) 100 MG capsule Take 1 capsule (100 mg total) by mouth 3 (three) times daily.    tamsulosin (FLOMAX) 0.4 mg Cap Take 1 capsule (0.4 mg total) by mouth once daily.    pantoprazole (PROTONIX) 40 MG tablet Take 1 tablet (40 mg total) by mouth once daily.    propylene glycol/peg 400/PF (SYSTANE, PF, OPHT) Apply to eye.       Past Medical History:   Diagnosis Date    GERD (gastroesophageal reflux disease)     Hypertension     Neuropathy     Unspecified glaucoma        Past Surgical History:   Procedure Laterality Date    COLONOSCOPY W/ POLYPECTOMY  01/28/2022    COLONOSCOPY, WITH POLYPECTOMY USING SNARE N/A 10/30/2023    Procedure: COLONOSCOPY, WITH POLYPECTOMY USING SNARE;  Surgeon: Ngoc Fenton MD;  Location: [facility] ENDOSCOPY;  Service: Gastroenterology;  Laterality: N/A;    CYST REMOVAL      Facial    EGD, WITH CLOSED BIOPSY N/A 10/30/2023    Procedure: EGD;  Surgeon: Ngoc Fenton MD;  Location: [facility] ENDOSCOPY;  Service: Gastroenterology;  Laterality: N/A;    SURGICAL REMOVAL OF LESION OF FACE Bilateral 07/18/2022    Procedure: EXCISION, LESION, FACE AND NECK;  Surgeon: Marcelo Capps MD;  Location: [facility] OR;  Service: ENT;  Laterality: Bilateral;       Review  of patient's allergies indicates:  No Known Allergies      Family History       Problem Relation (Age of Onset)    Alzheimer's disease Maternal Grandmother    Aneurysm Sister    Cancer Father    Diabetes Mother, Brother    Heart disease Mother    Stomach cancer Brother          Tobacco Use    Smoking status: Former     Types: Cigars, Cigarettes     Passive exposure: Current    Smokeless tobacco: Never    Tobacco comments:     Quit cigarettes in 2019, smokes cigars 1-2 times a week   Substance and Sexual Activity    Alcohol use: Not Currently    Drug use: Never    Sexual activity: Not Currently     Review of Systems  Objective:     Vital Signs (Most Recent):  Temp: 98 °F (36.7 °C) (08/13/24 0648)  Pulse: 63 (08/13/24 0648)  Resp: 18 (08/13/24 0648)  BP: 129/84 (08/13/24 0648)  SpO2: 97 % (08/13/24 0648) Vital Signs (24h Range):  Temp:  [98 °F (36.7 °C)] 98 °F (36.7 °C)  Pulse:  [63] 63  Resp:  [18] 18  SpO2:  [97 %] 97 %  BP: (129)/(84) 129/84     Weight: 101.3 kg (223 lb 5.2 oz) (08/13/24 0648)  Body mass index is 32.98 kg/m².    Not recorded         Physical Exam  Constitutional:       Appearance: Normal appearance.   HENT:      Head: Normocephalic.   Eyes:      Comments: Cataract OD   Cardiovascular:      Rate and Rhythm: Normal rate and regular rhythm.   Pulmonary:      Effort: Pulmonary effort is normal.      Breath sounds: Normal breath sounds.   Abdominal:      General: Abdomen is flat. Bowel sounds are normal.      Palpations: Abdomen is soft.   Skin:     General: Skin is warm.   Neurological:      General: No focal deficit present.      Mental Status: He is alert.   Psychiatric:         Attention and Perception: Attention normal.         Mood and Affect: Mood normal.        Assessment/Plan:      Patient is OK for cataract surgery OD  Duncan Arevalo MD  Ophthalmology  [facility]

## 2024-08-13 NOTE — OP NOTE
Operative Note  Ophthalmology Service    Date of Procedure:  8/13/2024    Surgeon:   Duncan Arevalo MD    Staff Physician: Duncan Arevalo MD    Pre-Operative Diagnosis:   Nuclear sclerotic cataract of right eye [H25.11]     Post-Operative Diagnosis: Same as pre-operative diagnosis    Treatments/Procedures Performed:   Procedure(s) (LRB):  EXTRACTION, CATARACT, WITH IOL INSERT- OD (Right)     Intraoperative Findings: Cataract    Anesthesia: GETA    Complications: None    Estimated Blood Loss: None    Implant:    Implant Name Type Inv. Item Serial No.  Lot No. LRB No. Used Action   [employer] UV IOL   [id number]  Right 1 Implanted        Procedure in detail: The patient had a history of painless progressive vision loss interfering with activities of daily living.  Risks, benefits, alternatives, and complications were discussed thoroughly with the patient and the patient expressed understanding and a desire to proceed with the procedure. Informed consent was obtained, signed, and witnessed, and placed in the chart prior.     The patient was brought to the operating room and placed in supine position.  A time out was performed including patient's name, date of birth, anticipated procedure, surgical site location, and allergies.  After adequate anesthesia was achieved, the patient was prepped and draped in sterile fashion using topical Betadine. A sideport blade was used to make a paracentesis wound. Preservative free lidocaine was injected into the anterior chamber, followed by Amvisc. A 2.4 mm keratome blade was then used to make a clear corneal triplanar wound. A cystotome was used to make a tear in the anterior capsular flap, which was continued around with Utrata forceps to complete a continuous curvilinear capsulorhexis. BSS was then used for hydrodissection. The lens was noted to spin freely in the bag. The crystalline lens was then removed in a [employer] manner with the  phacoemulsification handpiece. Irrigation and aspiration handpiece was then used to remove the remaining cortical material. Amvisc was then used to fill the capsular bag and the lens as mentioned above was placed in the bag. The I&A was used to remove the remaining amvisc, and the wounds were hydrated with BSS. The wounds were noted to be watertight. The eye was noted to have a good physiological pressure. The lid speculum was removed under direct visualization. Topical Vigamox, Pred-Forte, and flurbiprofen eye drops were placed in the eye. The eye was then covered with a shield and patch. The patient tolerated the procedure well without complications. The patient was brought to the recovery room in stable condition.  The patient was given instructions regarding postoperative care and the importance of follow-up.

## 2024-08-13 NOTE — ANESTHESIA PROCEDURE NOTES
Intubation    Date/Time: 8/13/2024 10:21 AM    Performed by: Merrill Huynh CRNA  Authorized by: Merrill Huynh CRNA    Intubation:     Induction:  Intravenous    Intubated:  Postinduction    Mask Ventilation:  Not attempted    Attempts:  1    Attempted By:  CRNA    Difficult Airway Encountered?: No      Complications:  None    Airway Device:  Supraglottic airway/LMA    Airway Device Size:  4.0    Style/Cuff Inflation:  Cuffed (inflated to minimal occlusive pressure)    Placement Verified By:  Capnometry    Complicating Factors:  None    Findings Post-Intubation:  BS equal bilateral

## 2024-08-13 NOTE — ANESTHESIA POSTPROCEDURE EVALUATION
Anesthesia Post Evaluation    Patient: Arcadio Scott    Procedure(s) Performed: Procedure(s) (LRB):  EXTRACTION, CATARACT, WITH IOL INSERT- OD (Right)    Final Anesthesia Type: general      Patient location during evaluation: OPS  Patient participation: Yes- Able to Participate  Level of consciousness: awake and alert and oriented  Post-procedure vital signs: reviewed and stable  Pain management: adequate  Airway patency: patent    PONV status at discharge: No PONV  Anesthetic complications: no      Cardiovascular status: stable  Respiratory status: unassisted, spontaneous ventilation and room air  Hydration status: euvolemic  Follow-up not needed.              Vitals Value Taken Time   /68 08/13/24 1126   Temp 36.4 °C (97.5 °F) 08/13/24 1108   Pulse 66 08/13/24 1126   Resp 18 08/13/24 1126   SpO2 100 % 08/13/24 1126         Event Time   Out of Recovery 11:07:03         Pain/Rosanna Score: Rosanna Score: 10 (8/13/2024 11:08 AM)  Modified Rosanna Score: 20 (8/13/2024 11:08 AM)

## 2024-08-13 NOTE — TRANSFER OF CARE
Anesthesia Transfer of Care Note    Patient: Arcadio Scott    Procedure(s) Performed: Procedure(s) (LRB):  EXTRACTION, CATARACT, WITH IOL INSERT- OD (Right)    Patient location: PACU    Anesthesia Type: general    Transport from OR: Transported from OR on room air with adequate spontaneous ventilation    Post pain: adequate analgesia    Post assessment: no apparent anesthetic complications    Post vital signs: stable    Level of consciousness: sedated    Nausea/Vomiting: no nausea/vomiting    Complications: none    Transfer of care protocol was followedComments: /77  HR 73  RR 18  O2 Sat 99  Temp 36.3      Last vitals: Visit Vitals  /84   Pulse 63   Temp 36.7 °C (98 °F) (Oral)   Resp 18   Wt 101.3 kg (223 lb 5.2 oz)   SpO2 97%   BMI 32.98 kg/m²

## 2024-08-14 VITALS
OXYGEN SATURATION: 100 % | TEMPERATURE: 98 F | BODY MASS INDEX: 32.98 KG/M2 | DIASTOLIC BLOOD PRESSURE: 68 MMHG | HEART RATE: 66 BPM | WEIGHT: 223.31 LBS | RESPIRATION RATE: 18 BRPM | SYSTOLIC BLOOD PRESSURE: 133 MMHG

## 2024-08-19 ENCOUNTER — OFFICE VISIT (OUTPATIENT)
Dept: UROLOGY | Facility: CLINIC | Age: 64
End: 2024-08-19
Payer: MEDICAID

## 2024-08-19 VITALS
WEIGHT: 229 LBS | BODY MASS INDEX: 33.92 KG/M2 | HEART RATE: 67 BPM | OXYGEN SATURATION: 98 % | SYSTOLIC BLOOD PRESSURE: 126 MMHG | DIASTOLIC BLOOD PRESSURE: 77 MMHG | RESPIRATION RATE: 18 BRPM | HEIGHT: 69 IN

## 2024-08-19 DIAGNOSIS — N32.81 OAB (OVERACTIVE BLADDER): ICD-10-CM

## 2024-08-19 DIAGNOSIS — R31.9 HEMATURIA, UNSPECIFIED TYPE: ICD-10-CM

## 2024-08-19 DIAGNOSIS — N40.1 BENIGN PROSTATIC HYPERPLASIA WITH WEAK URINARY STREAM: ICD-10-CM

## 2024-08-19 DIAGNOSIS — R39.12 BENIGN PROSTATIC HYPERPLASIA WITH WEAK URINARY STREAM: ICD-10-CM

## 2024-08-19 DIAGNOSIS — N40.0 BENIGN PROSTATIC HYPERPLASIA, UNSPECIFIED WHETHER LOWER URINARY TRACT SYMPTOMS PRESENT: Primary | ICD-10-CM

## 2024-08-19 LAB
BILIRUB SERPL-MCNC: NORMAL MG/DL
BLOOD URINE, POC: NORMAL
COLOR, POC UA: NORMAL
GLUCOSE UR QL STRIP: NORMAL
KETONES UR QL STRIP: NORMAL
LEUKOCYTE ESTERASE URINE, POC: NORMAL
NITRITE, POC UA: NORMAL
PH, POC UA: 6
PROTEIN, POC: NORMAL
SPECIFIC GRAVITY, POC UA: 1.02
UROBILINOGEN, POC UA: 0.2

## 2024-08-19 PROCEDURE — 99213 OFFICE O/P EST LOW 20 MIN: CPT | Mod: S$PBB,,, | Performed by: NURSE PRACTITIONER

## 2024-08-19 PROCEDURE — 88108 CYTOPATH CONCENTRATE TECH: CPT | Mod: TC | Performed by: NURSE PRACTITIONER

## 2024-08-19 PROCEDURE — 1160F RVW MEDS BY RX/DR IN RCRD: CPT | Mod: CPTII,,, | Performed by: NURSE PRACTITIONER

## 2024-08-19 PROCEDURE — 99214 OFFICE O/P EST MOD 30 MIN: CPT | Mod: PBBFAC,25 | Performed by: NURSE PRACTITIONER

## 2024-08-19 PROCEDURE — 3008F BODY MASS INDEX DOCD: CPT | Mod: CPTII,,, | Performed by: NURSE PRACTITIONER

## 2024-08-19 PROCEDURE — 1159F MED LIST DOCD IN RCRD: CPT | Mod: CPTII,,, | Performed by: NURSE PRACTITIONER

## 2024-08-19 PROCEDURE — 3074F SYST BP LT 130 MM HG: CPT | Mod: CPTII,,, | Performed by: NURSE PRACTITIONER

## 2024-08-19 PROCEDURE — 81001 URINALYSIS AUTO W/SCOPE: CPT | Mod: PBBFAC | Performed by: NURSE PRACTITIONER

## 2024-08-19 PROCEDURE — 3078F DIAST BP <80 MM HG: CPT | Mod: CPTII,,, | Performed by: NURSE PRACTITIONER

## 2024-08-19 NOTE — PROGRESS NOTES
Patient seen by Michael DIAZ. Pt will RTC 6 months with PSA . Pt eduction given both written and verbal.

## 2024-08-19 NOTE — PROGRESS NOTES
Chief Complaint:   Chief Complaint   Patient presents with    Follow-up     6 month follow up (**Last PSA: 0.56 on 06/07/2024**)       HPI:   Patient is a 62-year-old male three-month follow-up for microscopic hematuria.    Patient treated by PCP with a random UA noted to have trace RBCs and a urine sample.     Patient seen by Dr. Rhodes for cystoscope was 07/13/2023 which was negative plan serial follow-up with a urine cytology 6 months x2 then yearly.    PSA 0.56 on 06/07/2024.    On patient's  previous visit he presented with urinary urgency, intermittent urinary incontinence due to urgency, urinary frequency of 7-10 times during the day and 4-5 times at night/nocturia.  Myrbetriq 25 mg p.o. daily started.    Patient tamsulosin 0 4 mg p.o. daily & Myrbetriq 25 mg was p.o. daily.   Patient denies any dysuria, urinary retention, gross hematuria.      Allergies:  Review of patient's allergies indicates:  No Known Allergies    Medications:  Current Outpatient Medications   Medication Sig Dispense Refill    amLODIPine (NORVASC) 10 MG tablet Take 1 tablet (10 mg total) by mouth once daily. 90 tablet 3    hydrocortisone 1 % cream Apply topically 2 (two) times daily. 30 g 0    mirabegron (MYRBETRIQ) 25 mg Tb24 ER tablet Take 1 tablet (25 mg total) by mouth once daily. 90 tablet 3    pantoprazole (PROTONIX) 40 MG tablet Take 1 tablet (40 mg total) by mouth once daily. 30 tablet 11    potassium chloride SA (K-DUR,KLOR-CON) 20 MEQ tablet Take 1 tablet (20 mEq total) by mouth once daily. 90 tablet 1    pregabalin (LYRICA) 100 MG capsule Take 1 capsule (100 mg total) by mouth 3 (three) times daily. 90 capsule 1    propylene glycol/peg 400/PF (SYSTANE, PF, OPHT) Apply to eye.      tamsulosin (FLOMAX) 0.4 mg Cap Take 1 capsule (0.4 mg total) by mouth once daily. 90 capsule 3     No current facility-administered medications for this visit.     Facility-Administered Medications Ordered in Other Visits   Medication Dose Route  Frequency Provider Last Rate Last Admin    lactated ringers infusion   Intravenous Continuous Deanna Gaona MD 10 mL/hr at 10/30/23 0808 New Bag at 10/30/23 0808    LIDOcaine (PF) 10 mg/ml (1%) injection 10 mg  1 mL Intradermal Once Marina De La Torre, AN           Review of Systems:  General: No fever, chills, fatigability, or weight loss.  Skin: No rashes, itching, or changes in color or texture of skin.  Chest: Denies GRANT, cyanosis, wheezing, cough, and sputum production.  Abdomen: Appetite fine. No weight loss. Denies diarrhea, abdominal pain, hematemesis, or blood in stool.  Musculoskeletal: No joint stiffness or swelling. Denies back pain.  : As above.  All other review of systems negative.    PMH:  Past Medical History:   Diagnosis Date    GERD (gastroesophageal reflux disease)     Hypertension     Neuropathy     Unspecified glaucoma        PSH:  Past Surgical History:   Procedure Laterality Date    CATARACT EXTRACTION W/  INTRAOCULAR LENS IMPLANT Right 8/13/2024    Procedure: EXTRACTION, CATARACT, WITH IOL INSERT- OD;  Surgeon: Duncan Arevalo MD;  Location: [facility] OR;  Service: Ophthalmology;  Laterality: Right;    COLONOSCOPY W/ POLYPECTOMY  01/28/2022    COLONOSCOPY, WITH POLYPECTOMY USING SNARE N/A 10/30/2023    Procedure: COLONOSCOPY, WITH POLYPECTOMY USING SNARE;  Surgeon: Ngoc Fenton MD;  Location: [facility] ENDOSCOPY;  Service: Gastroenterology;  Laterality: N/A;    CYST REMOVAL      Facial    EGD, WITH CLOSED BIOPSY N/A 10/30/2023    Procedure: EGD;  Surgeon: Ngoc Fenton MD;  Location: [facility] ENDOSCOPY;  Service: Gastroenterology;  Laterality: N/A;    SURGICAL REMOVAL OF LESION OF FACE Bilateral 07/18/2022    Procedure: EXCISION, LESION, FACE AND NECK;  Surgeon: Marcelo Capps MD;  Location: [facility] OR;  Service: ENT;  Laterality: Bilateral;       FamHx:  Family History   Problem Relation Name Age of Onset    Diabetes Mother      Heart disease Mother      Cancer Father           Unsure of source    Aneurysm Sister      Diabetes Brother      Stomach cancer Brother      Alzheimer's disease Maternal Grandmother         SocHx:  Social History     Socioeconomic History    Marital status: Single    Number of children: 0   Tobacco Use    Smoking status: Former     Types: Cigars, Cigarettes     Passive exposure: Current    Smokeless tobacco: Never    Tobacco comments:     Quit cigarettes in 2019, smokes cigars 1-2 times a week   Substance and Sexual Activity    Alcohol use: Not Currently    Drug use: Never    Sexual activity: Not Currently     Social Determinants of Health     Financial Resource Strain: Low Risk  (9/28/2023)    Overall Financial Resource Strain (CARDIA)     Difficulty of Paying Living Expenses: Not hard at all   Food Insecurity: No Food Insecurity (9/28/2023)    Hunger Vital Sign     Worried About Running Out of Food in the Last Year: Never true     Ran Out of Food in the Last Year: Never true   Transportation Needs: No Transportation Needs (9/28/2023)    PRAPARE - Transportation     Lack of Transportation (Medical): No     Lack of Transportation (Non-Medical): No   Physical Activity: Inactive (9/28/2023)    Exercise Vital Sign     Days of Exercise per Week: 0 days     Minutes of Exercise per Session: 0 min   Stress: No Stress Concern Present (9/28/2023)    [employer] - Occupational Stress Questionnaire     Feeling of Stress : Not at all   Housing Stability: Low Risk  (6/17/2024)    Housing Stability Vital Sign     Unable to Pay for Housing in the Last Year: No     Homeless in the Last Year: No       Physical Exam:  Vitals:    08/19/24 0745   BP: 126/77   Pulse: 67   Resp: 18     General: A&Ox3, no apparent distress, no deformities  Neck: No masses, normal thyroid  Lungs: CTA kayleigh, no use of accessory muscles  Heart: RRR, no arrhythmias  Abdomen: Soft, NT, ND, no masses, no hernias, no hepatosplenomegaly  Lymphatic: Neck and groin nodes  negative  Skin: The skin is warm and dry. No jaundice.  Ext: No c/c/e.    GUMale: Test desc kayleigh, no abnormalities of epididymus. Penis uncircumcised, with normal penile and scrotal skin. Meatus normal. Normal rectal tone, 12 o'clock external  hemorrhoid. Prostate: 2 finger breadth wide, flat, smooth, soft, nontender, no nodules or masses appreciated. SV not palpable. Perineum and anus normal.      Labs:    Latest Reference Range & Units 02/14/22 09:56 03/07/23 12:06 10/09/23 09:26 06/07/24 09:27   Prostate Specific Antigen <=4.00 ng/mL 0.50 0.82 0.88 0.56         Urinalysis:  Results for orders placed or performed in visit on 08/19/24   POCT URINE DIPSTICK WITH MICROSCOPE, AUTOMATED   Result Value Ref Range    Color, UA Light Yellow     Spec Grav UA 1.020     pH, UA 6.0     WBC, UA neg     Nitrite, UA neg     Protein, POC neg     Glucose, UA neg     Ketones, UA neg     Urobilinogen, UA 0.2     Bilirubin, POC neg     Blood, UA neg    Microscopic urinalysis negative for RBCs, WBCs, nitrites.      Impression:  1. Benign prostatic hyperplasia, unspecified whether lower urinary tract symptoms present  - POCT URINE DIPSTICK WITH MICROSCOPE, AUTOMATED      Plan:  Instructed patient to continue tamsulosin 0.4 mg p.o. daily and Myrbetriq 25 mg p.o. daily.  Instructed patient on timed voiding every 2-3 hrs, double voiding, avoidance of bladder irritants such as alcohol, citrus foods, chocolate, caffeinated drinks, energy drinks, spicy foods, sugar, caffeine products, sodas. Instructed patient to avoid drinking fluids 1-2 hours prior to bedtime & void immediately before bedtime.   RTC six-month with PSA.  Instructed patient if develops any abnormal urologic symptoms notify clinic to be re-evaluate treated or during after hours go to emergency room versus urgent here.                           [facility]

## 2024-08-20 LAB
ESTROGEN SERPL-MCNC: NORMAL PG/ML
INSULIN SERPL-ACNC: NORMAL U[IU]/ML
LAB AP CLINICAL INFORMATION: NORMAL
LAB AP GROSS DESCRIPTION: NORMAL
LAB AP URINE CYTOLOGY INTERPRETATION SPECIMEN 1: NORMAL

## 2024-08-28 ENCOUNTER — OFFICE VISIT (OUTPATIENT)
Dept: UROLOGY | Facility: CLINIC | Age: 64
End: 2024-08-28
Payer: MEDICAID

## 2024-08-28 DIAGNOSIS — R31.9 HEMATURIA, UNSPECIFIED TYPE: Primary | ICD-10-CM

## 2024-08-28 PROCEDURE — 99442 PR PHYSICIAN TELEPHONE EVALUATION 11-20 MIN: CPT | Mod: 95,,, | Performed by: NURSE PRACTITIONER

## 2024-08-28 NOTE — PROGRESS NOTES
Established Patient - Audio Only Telehealth Visit     The patient location is: Home  The chief complaint leading to consultation is:  Hematuria  Visit type: Virtual visit with audio only (telephone)  Total time spent with patient:  25  minutes     The reason for the audio only service rather than synchronous audio virtual visit was related to technical difficulties or patient preference/necessity.     Each patient to whom I provide medical services by telemedicine is:  (1) informed of the relationship between the physician and patient and the respective role of any other health care provider with respect to management of the patient; and (2) notified that they may decline to receive medical services by telemedicine and may withdraw from such care at any time. Patient verbally consented to receive this service via voice-only telephone call.     This service was not originating from a related E/M service provided within the previous 7 days nor will  to an E/M service or procedure within the next 24 hours or my soonest available appointment.  Prevailing standard of care was able to be met in this audio-only visit.  Chief Complaint: Hematuria:      HPI:   Patient is a 62-year-old male 8 day Audio Virtual visit F/U for Hx of Hematuria with a Urine Cytology results.    Patient treated by PCP with a random UA noted to have trace RBCs and a urine sample.     Patient seen by Dr. Rhodes for cystoscope was 07/13/2023 which was negative plan serial follow-up with a urine cytology 6 months x2 then yearly.    PSA 0.56 on 06/07/2024.    Urine Cytology : Negative for high grade urothelial carcinoma, Negative for malignancy.       Allergies:  Review of patient's allergies indicates:  No Known Allergies    Medications:  Current Outpatient Medications   Medication Sig Dispense Refill    amLODIPine (NORVASC) 10 MG tablet Take 1 tablet (10 mg total) by mouth once daily. 90 tablet 3    hydrocortisone 1 % cream Apply topically 2  (two) times daily. 30 g 0    mirabegron (MYRBETRIQ) 25 mg Tb24 ER tablet Take 1 tablet (25 mg total) by mouth once daily. 90 tablet 3    pantoprazole (PROTONIX) 40 MG tablet Take 1 tablet (40 mg total) by mouth once daily. 30 tablet 11    potassium chloride SA (K-DUR,KLOR-CON) 20 MEQ tablet Take 1 tablet (20 mEq total) by mouth once daily. 90 tablet 1    pregabalin (LYRICA) 100 MG capsule Take 1 capsule (100 mg total) by mouth 3 (three) times daily. 90 capsule 1    propylene glycol/peg 400/PF (SYSTANE, PF, OPHT) Apply to eye.      tamsulosin (FLOMAX) 0.4 mg Cap Take 1 capsule (0.4 mg total) by mouth once daily. 90 capsule 3     No current facility-administered medications for this visit.     Facility-Administered Medications Ordered in Other Visits   Medication Dose Route Frequency Provider Last Rate Last Admin    lactated ringers infusion   Intravenous Continuous Deanna Gaona MD 10 mL/hr at 10/30/23 0808 New Bag at 10/30/23 0808    LIDOcaine (PF) 10 mg/ml (1%) injection 10 mg  1 mL Intradermal Once Marina De La Torre, JESSIEP           Review of Systems:  General: No fever, chills, fatigability, or weight loss.  Skin: No rashes, itching, or changes in color or texture of skin.  Chest: Denies GRANT, cyanosis, wheezing, cough, and sputum production.  Abdomen: Appetite fine. No weight loss. Denies diarrhea, abdominal pain, hematemesis, or blood in stool.  Musculoskeletal: No joint stiffness or swelling. Denies back pain.  : As above.  All other review of systems negative.    PMH:  Past Medical History:   Diagnosis Date    GERD (gastroesophageal reflux disease)     Hypertension     Neuropathy     Unspecified glaucoma        PSH:  Past Surgical History:   Procedure Laterality Date    CATARACT EXTRACTION W/  INTRAOCULAR LENS IMPLANT Right 8/13/2024    Procedure: EXTRACTION, CATARACT, WITH IOL INSERT- OD;  Surgeon: Duncan Arevalo MD;  Location: [facility];  Service: Ophthalmology;  Laterality: Right;     COLONOSCOPY W/ POLYPECTOMY  01/28/2022    COLONOSCOPY, WITH POLYPECTOMY USING SNARE N/A 10/30/2023    Procedure: COLONOSCOPY, WITH POLYPECTOMY USING SNARE;  Surgeon: Ngoc Fenton MD;  Location: [facility] ENDOSCOPY;  Service: Gastroenterology;  Laterality: N/A;    CYST REMOVAL      Facial    EGD, WITH CLOSED BIOPSY N/A 10/30/2023    Procedure: EGD;  Surgeon: Ngoc Fenton MD;  Location: [facility] ENDOSCOPY;  Service: Gastroenterology;  Laterality: N/A;    SURGICAL REMOVAL OF LESION OF FACE Bilateral 07/18/2022    Procedure: EXCISION, LESION, FACE AND NECK;  Surgeon: Marcelo Capps MD;  Location: [facility] OR;  Service: ENT;  Laterality: Bilateral;       FamHx:  Family History   Problem Relation Name Age of Onset    Diabetes Mother      Heart disease Mother      Cancer Father          Unsure of source    Aneurysm Sister      Diabetes Brother      Stomach cancer Brother      Alzheimer's disease Maternal Grandmother         SocHx:  Social History     Socioeconomic History    Marital status: Single    Number of children: 0   Tobacco Use    Smoking status: Former     Types: Cigars, Cigarettes     Passive exposure: Current    Smokeless tobacco: Never    Tobacco comments:     Quit cigarettes in 2019, smokes cigars 1-2 times a week   Substance and Sexual Activity    Alcohol use: Not Currently    Drug use: Never    Sexual activity: Not Currently     Social Determinants of Health     Financial Resource Strain: Low Risk  (9/28/2023)    Overall Financial Resource Strain (CARDIA)     Difficulty of Paying Living Expenses: Not hard at all   Food Insecurity: No Food Insecurity (9/28/2023)    Hunger Vital Sign     Worried About Running Out of Food in the Last Year: Never true     Ran Out of Food in the Last Year: Never true   Transportation Needs: No Transportation Needs (9/28/2023)    PRAPARE - Transportation     Lack of Transportation (Medical): No     Lack of Transportation (Non-Medical): No   Physical Activity: Inactive  (9/28/2023)    Exercise Vital Sign     Days of Exercise per Week: 0 days     Minutes of Exercise per Session: 0 min   Stress: No Stress Concern Present (9/28/2023)    [employer] - Occupational Stress Questionnaire     Feeling of Stress : Not at all   Housing Stability: Low Risk  (6/17/2024)    Housing Stability Vital Sign     Unable to Pay for Housing in the Last Year: No     Homeless in the Last Year: No       Physical Exam:  There were no vitals filed for this visit.      Urinalysis:  Final Diagnosis      Urine, clean catch:  - Negative for malignancy.        Electronically signed by Yolande Perkins MD on 8/20/2024 at 1014   Gross Description     1. Urine, Clean Catch:   Received fresh is 70 ml of clear yellow fluid submitted for cytospin preparation.    Specimen 1 Interpretation   Negative for high grade urothelial carcinoma   Electronically signed by Yolande Perkins MD on 8/20/2024 at 1014             Impression:  Hematuria  There are no diagnoses linked to this encounter.    Plan:  Instructed patient to RTC 6 months with Urine Cytology  Instructed patient if develops any abnormal urologic symptoms notify clinic to be re-evaluate treated or during after hours go to emergency room versus urgent here.                           [facility]

## 2024-08-29 ENCOUNTER — TELEPHONE (OUTPATIENT)
Dept: NEUROLOGY | Facility: CLINIC | Age: 64
End: 2024-08-29

## 2024-08-29 ENCOUNTER — OFFICE VISIT (OUTPATIENT)
Dept: NEUROLOGY | Facility: CLINIC | Age: 64
End: 2024-08-29
Payer: MEDICAID

## 2024-08-29 VITALS
WEIGHT: 228.19 LBS | HEIGHT: 69 IN | RESPIRATION RATE: 12 BRPM | TEMPERATURE: 98 F | SYSTOLIC BLOOD PRESSURE: 124 MMHG | OXYGEN SATURATION: 97 % | HEART RATE: 68 BPM | BODY MASS INDEX: 33.8 KG/M2 | DIASTOLIC BLOOD PRESSURE: 75 MMHG

## 2024-08-29 DIAGNOSIS — G60.8 PERIPHERAL SENSORY-MOTOR AXONAL POLYNEUROPATHY: Primary | ICD-10-CM

## 2024-08-29 DIAGNOSIS — Z78.9 ALCOHOL USE: ICD-10-CM

## 2024-08-29 DIAGNOSIS — F81.0 READING IMPAIRMENT: ICD-10-CM

## 2024-08-29 DIAGNOSIS — M48.061 SPINAL STENOSIS OF LUMBAR REGION WITHOUT NEUROGENIC CLAUDICATION: ICD-10-CM

## 2024-08-29 DIAGNOSIS — G62.9 POLYNEUROPATHY: ICD-10-CM

## 2024-08-29 PROCEDURE — 1159F MED LIST DOCD IN RCRD: CPT | Mod: CPTII,,, | Performed by: NURSE PRACTITIONER

## 2024-08-29 PROCEDURE — 1160F RVW MEDS BY RX/DR IN RCRD: CPT | Mod: CPTII,,, | Performed by: NURSE PRACTITIONER

## 2024-08-29 PROCEDURE — 3008F BODY MASS INDEX DOCD: CPT | Mod: CPTII,,, | Performed by: NURSE PRACTITIONER

## 2024-08-29 PROCEDURE — 3078F DIAST BP <80 MM HG: CPT | Mod: CPTII,,, | Performed by: NURSE PRACTITIONER

## 2024-08-29 PROCEDURE — 99214 OFFICE O/P EST MOD 30 MIN: CPT | Mod: S$PBB,,, | Performed by: NURSE PRACTITIONER

## 2024-08-29 PROCEDURE — 3074F SYST BP LT 130 MM HG: CPT | Mod: CPTII,,, | Performed by: NURSE PRACTITIONER

## 2024-08-29 PROCEDURE — 99215 OFFICE O/P EST HI 40 MIN: CPT | Mod: PBBFAC | Performed by: NURSE PRACTITIONER

## 2024-08-29 RX ORDER — PREGABALIN 100 MG/1
100 CAPSULE ORAL 3 TIMES DAILY
Qty: 90 CAPSULE | Refills: 1 | Status: SHIPPED | OUTPATIENT
Start: 2024-08-29 | End: 2024-10-28

## 2024-08-29 NOTE — TELEPHONE ENCOUNTER
Patient had appointment with Neurology and requesting refill on Pantoprazole to be sent to [facility] on [address]. Patient states pharmacy informed him he needs a PA as well.     Thank you

## 2024-08-29 NOTE — PROGRESS NOTES
[facility] Follow Up Office Visit Note    Initial Visit Date: 1/10/2023  Last Visit Date: 7/11/2024  Current Visit Date:  08/29/2024    Chief Complaint:     Chief Complaint   Patient presents with    Numbness     Left upper outside thigh down to toes and feet     History of Present Illness:      This is 63 y.o. male with history of chronic lower back pain, alcohol dependence who was referred for bilateral lower extremity numbness for 1 + year.  During that visit, Lyrica was increased to 100mg PO TID.    Interim Hx:  Today, Pt taking Lyrica 100 mg BID instead of TID. Notes improvement w/burning/tingling/numbness feeling to feet w/Lyrica. Improved w/elevating legs. Worsens with activity. Interferes w/ADLs. Drinks <1-2 beers weekly. Decreased cigar smoking to 1-2/week. Not employed at this time. Continues w/R lower back pain that he has had for years, but more consistent for last few months. Pain scale 9/10, described as aching. Interferes w/ADLs. Worse with prolonged sitting, improves w/standing. Has been evaluated by Dr. Elaine, does not wish to have surgery.    Presenting Hx:  Patient states that he had bilateral LBP radiating down bilateral LE, L>R. He reports decreased sensation in bilateral feet, left worse than right. Denied any trip and falls. Reports bilateral knee pain, left > right. Drinks alcohol throughout the week, drinking around a case of beer every 2 weeks. Quit smoking in 11/2019. Denied any saddle anesthesia. Denied any urinary incontinence. 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   Medications:     Current Outpatient Medications on File Prior to Visit   Medication Sig Dispense Refill    amLODIPine (NORVASC) 10 MG tablet Take 1 tablet (10 mg total) by mouth once daily. 90 tablet 3    hydrocortisone 1 % cream Apply topically 2 (two) times daily. 30 g 0    pantoprazole (PROTONIX) 40 MG tablet Take 1 tablet (40 mg total) by mouth once daily. 30 tablet 11    propylene glycol/peg 400/PF  (SYSTANE, PF, OPHT) Apply to eye.      tamsulosin (FLOMAX) 0.4 mg Cap Take 1 capsule (0.4 mg total) by mouth once daily. 90 capsule 3    [DISCONTINUED] mirabegron (MYRBETRIQ) 25 mg Tb24 ER tablet Take 1 tablet (25 mg total) by mouth once daily. 90 tablet 3    [DISCONTINUED] pregabalin (LYRICA) 100 MG capsule Take 1 capsule (100 mg total) by mouth 3 (three) times daily. (Patient taking differently: Take 100 mg by mouth 3 (three) times daily. Takes twice a day) 90 capsule 1    potassium chloride SA (K-DUR,KLOR-CON) 20 MEQ tablet Take 1 tablet (20 mEq total) by mouth once daily. (Patient not taking: Reported on 8/29/2024) 90 tablet 1     Current Facility-Administered Medications on File Prior to Visit   Medication Dose Route Frequency Provider Last Rate Last Admin    lactated ringers infusion   Intravenous Continuous Deanna Gaona MD 10 mL/hr at 10/30/23 0808 New Bag at 10/30/23 0808    LIDOcaine (PF) 10 mg/ml (1%) injection 10 mg  1 mL Intradermal Once Marina De La Torre, AN           Labs:     Results for orders placed or performed in visit on 08/19/24   POCT URINE DIPSTICK WITH MICROSCOPE, AUTOMATED   Result Value Ref Range    Color, UA Light Yellow     Spec Grav UA 1.020     pH, UA 6.0     WBC, UA neg     Nitrite, UA neg     Protein, POC neg     Glucose, UA neg     Ketones, UA neg     Urobilinogen, UA 0.2     Bilirubin, POC neg     Blood, UA neg    Cytology, Urine   Result Value Ref Range    Case Report       [facility]                       Case: [id number]                                 Authorizing Provider:  Chaitanya Rodriguez NP           Collected:           08/19/2024 08:45 AM          Ordering Location:     [facility] -       Received:            08/19/2024 10:05 AM                                 Urology                                                                      Pathologist:           Yolande Perkins MD                                                         Specimen:    Urine, Clean Catch                                                                         Clinical Information       Hematuria      Final Diagnosis         Urine, clean catch:  - Negative for malignancy.          Gross Description       1. Urine, Clean Catch:   Received fresh is 70 ml of clear yellow fluid submitted for cytospin preparation.       Specimen 1 Interpretation Negative for high grade urothelial carcinoma       Studies:      - MRI C-spine 2/29/2024: mild degenerative barrowing of spinal canal at C3-5 and C6-7; multilevel neural foraminal stenosis noted    - MRI T-spine 2/29/2024: no definite convincing cord signal abnormality; no significant spinal canal or neural foraminal stenosis    - MRI L-spine w/out Kyree 8/28/2023 - multilevel DDD w/facet degenerative changes w/multilevel canal and foraminal stenosis    - CT L-spine w/out Kyree 7/14/2023 - multilevel degenerative changes    - NCS/EMG bilateral LE 8/12/2022 by Dr. Marck London:  I have reviewed the study independently and with the patient. Incomplete study.  Also, no reference range.  No conduction velocity.  Latency and amplitude of SNAP and CMAP mostly symmetric without reference range.  No F-waves.     Review of Systems:     Review of Systems   All other systems reviewed and are negative.    As per HPI    Physical Exams:     Vitals:    08/29/24 1142   BP: 124/75   Pulse: 68   Resp: 12   Temp: 98.3 °F (36.8 °C)     Physical Exam  Vitals and nursing note reviewed.   Constitutional:       Appearance: Normal appearance.   HENT:      Head: Normocephalic and atraumatic.      Right Ear: External ear normal.      Left Ear: External ear normal.      Nose: Nose normal.      Mouth/Throat:      Mouth: Mucous membranes are moist.      Pharynx: Oropharynx is clear.   Eyes:      Conjunctiva/sclera: Conjunctivae normal.   Cardiovascular:      Rate and Rhythm: Normal rate and regular rhythm.      Pulses: Normal pulses.   Pulmonary:      Effort:  Pulmonary effort is normal.   Abdominal:      General: There is no distension.      Tenderness: There is no guarding.   Musculoskeletal:         General: Normal range of motion.      Cervical back: Normal range of motion.      Comments: Painful lower back   Skin:     General: Skin is warm and dry.      Coloration: Skin is not jaundiced.      Findings: No lesion or rash.   Neurological:      Mental Status: He is alert.   Psychiatric:         Mood and Affect: Mood normal.     Comprehensive Neurological Exam:  Mental Status: Alert Oriented to Self, Date, and Place. Comprehension wnl. No dysarthria.   CN II - XII: DUARTE, No APD, VFFC, No ptosis OU, EOMI without nystagmus, LT/Temp symmetric in CN V1-3 distribution, Hearing grossly intact, Face Symmetric, Tongue and Uvula midline, Trapezius symmetric bilateral.   Motor: tone and bulk wnl throughout, no abnormal involuntary or voluntary movements, 5/5 to confrontation, Fine finger movements wnl b/l, No pronator drift.   Sensory: LT, Proprioception, Vibration, PP, Temp symmetric subjectively decreased in bilateral LE throughout.   Reflexes: 2+ throughout except for 1+ in bilateral AJ, plantar reflexes downward bilateral   Cerebellar: FNF wnl b/l, RAHM wnl b/l.  Romberg: Negative  Gait: antalgic gait, stooped posture, utilizing cane    Assessment:     This is 63 y.o. male with history of chronic lower back pain, alcohol dependence who was referred for bilateral lower extremity numbness. NCS/EMG results were inconclusive due to missing components based on report. Exam and history more consistent with lumbar radiculopathy. Neuropathy not better on increased Lyrica 100mg PO only taking BID instead of TID. MRI C-spine and T-spine completed and has been evaluated by Dr. Elaine. Pt would  like to seek injections.    Problem List Items Addressed This Visit          Neuro    Polyneuropathy    Peripheral sensory-motor axonal polyneuropathy - Primary    Relevant Medications    pregabalin  (LYRICA) 100 MG capsule    Reading impairment    Spinal stenosis of lumbar region without neurogenic claudication    Relevant Orders    Ambulatory referral/consult to Pain Clinic       Psychiatric    Alcohol use     Plan:     [] increase Lyrica to 100mg PO BID-TID  [] handout on back stretches  [] referral to pain management in [facility] 6 Months     I have explained the treatment plan, diagnosis, and prognosis to patient. All questions are answered to the best of my knowledge.     Face to face time 30 minutes, including documentation, chart review, counseling, education, review of test results, relevant medical records, and coordination of care.     Felicita Ramirez, NP-C  General Neurology  08/29/2024

## 2024-09-03 ENCOUNTER — HOSPITAL ENCOUNTER (OUTPATIENT)
Dept: CARDIOLOGY | Facility: HOSPITAL | Age: 64
Discharge: HOME | End: 2024-09-03
Attending: NURSE PRACTITIONER
Payer: MEDICAID

## 2024-09-03 ENCOUNTER — HOSPITAL ENCOUNTER (OUTPATIENT)
Dept: RADIOLOGY | Facility: HOSPITAL | Age: 64
Discharge: HOME | End: 2024-09-03
Attending: NURSE PRACTITIONER
Payer: MEDICAID

## 2024-09-03 VITALS
HEART RATE: 68 BPM | WEIGHT: 228.19 LBS | HEIGHT: 69 IN | BODY MASS INDEX: 33.8 KG/M2 | SYSTOLIC BLOOD PRESSURE: 132 MMHG | DIASTOLIC BLOOD PRESSURE: 72 MMHG | RESPIRATION RATE: 20 BRPM

## 2024-09-03 DIAGNOSIS — N52.9 ERECTILE DYSFUNCTION, UNSPECIFIED ERECTILE DYSFUNCTION TYPE: ICD-10-CM

## 2024-09-03 DIAGNOSIS — I10 HYPERTENSION, UNSPECIFIED TYPE: ICD-10-CM

## 2024-09-03 LAB
CV STRESS BASE HR: 68 BPM
DIASTOLIC BLOOD PRESSURE: 72 MMHG
NUC REST EJECTION FRACTION: 59
NUC STRESS EJECTION FRACTION: 59 %
OHS CV CPX 85 PERCENT MAX PREDICTED HEART RATE MALE: 134
OHS CV CPX ESTIMATED METS: 2
OHS CV CPX MAX PREDICTED HEART RATE: 158
OHS CV CPX PATIENT IS FEMALE: 0
OHS CV CPX PATIENT IS MALE: 1
OHS CV CPX PEAK DIASTOLIC BLOOD PRESSURE: 69 MMHG
OHS CV CPX PEAK HEAR RATE: 127 BPM
OHS CV CPX PEAK RATE PRESSURE PRODUCT: NORMAL
OHS CV CPX PEAK SYSTOLIC BLOOD PRESSURE: 162 MMHG
OHS CV CPX PERCENT MAX PREDICTED HEART RATE ACHIEVED: 80
OHS CV CPX RATE PRESSURE PRODUCT PRESENTING: 9180
OHS QRS DURATION: 82 MS
OHS QTC CALCULATION: 427 MS
STRESS ECHO POST EXERCISE DUR MIN: 2 MINUTES
STRESS ECHO POST EXERCISE DUR SEC: 27 SECONDS
SYSTOLIC BLOOD PRESSURE: 135 MMHG

## 2024-09-03 PROCEDURE — A9502 TC99M TETROFOSMIN: HCPCS | Performed by: NURSE PRACTITIONER

## 2024-09-03 PROCEDURE — 78452 HT MUSCLE IMAGE SPECT MULT: CPT

## 2024-09-03 PROCEDURE — 93017 CV STRESS TEST TRACING ONLY: CPT

## 2024-09-03 PROCEDURE — 63600175 PHARM REV CODE 636 W HCPCS: Performed by: NURSE PRACTITIONER

## 2024-09-03 RX ORDER — REGADENOSON 0.08 MG/ML
0.4 INJECTION, SOLUTION INTRAVENOUS
Status: COMPLETED | OUTPATIENT
Start: 2024-09-03 | End: 2024-09-03

## 2024-09-03 RX ADMIN — TETROFOSMIN 31.9 MILLICURIE: 1.38 INJECTION, POWDER, LYOPHILIZED, FOR SOLUTION INTRAVENOUS at 10:09

## 2024-09-03 RX ADMIN — TETROFOSMIN 10.4 MILLICURIE: 1.38 INJECTION, POWDER, LYOPHILIZED, FOR SOLUTION INTRAVENOUS at 09:09

## 2024-09-03 RX ADMIN — REGADENOSON 0.4 MG: 0.08 INJECTION, SOLUTION INTRAVENOUS at 09:09

## 2024-09-05 ENCOUNTER — OFFICE VISIT (OUTPATIENT)
Dept: FAMILY MEDICINE | Facility: CLINIC | Age: 64
End: 2024-09-05
Payer: MEDICAID

## 2024-09-05 VITALS
TEMPERATURE: 98 F | DIASTOLIC BLOOD PRESSURE: 71 MMHG | HEIGHT: 69 IN | SYSTOLIC BLOOD PRESSURE: 121 MMHG | HEART RATE: 63 BPM | BODY MASS INDEX: 34.18 KG/M2 | RESPIRATION RATE: 18 BRPM | WEIGHT: 230.81 LBS

## 2024-09-05 DIAGNOSIS — R94.39 ABNORMAL STRESS TEST: Primary | ICD-10-CM

## 2024-09-05 DIAGNOSIS — N52.9 ERECTILE DYSFUNCTION, UNSPECIFIED ERECTILE DYSFUNCTION TYPE: ICD-10-CM

## 2024-09-05 DIAGNOSIS — I10 PRIMARY HYPERTENSION: ICD-10-CM

## 2024-09-05 DIAGNOSIS — G60.8 PERIPHERAL SENSORY-MOTOR AXONAL POLYNEUROPATHY: ICD-10-CM

## 2024-09-05 PROCEDURE — 99215 OFFICE O/P EST HI 40 MIN: CPT | Mod: PBBFAC,PN | Performed by: NURSE PRACTITIONER

## 2024-09-05 NOTE — ASSESSMENT & PLAN NOTE
Stress test 9/3/25         Abnormal myocardial perfusion scan.    There is a moderate intensity, medium sized, reversible perfusion abnormality that is consistent with ischemia in the  basal to distal anterior wall(s) in the typical distribution of the LAD territory.    There are no other significant perfusion abnormalities.    The gated perfusion images showed an ejection fraction of 59% at rest. The gated perfusion images showed an ejection fraction of 59% post stress.    The patient reported no chest pain during the stress test.    There were no arrhythmias during stress.     The nuclear stress test is  fair quality.    On the nuclear stress test the EF is normal.  If the patient has an echo, the EF on the echo is considered more accurate.        The patient has a moderate risk nuclear stress test due to anterior reversible defect.     If patient is symptomatic, consider management with two anti-anginals    ABOVE IS HIS RECENT STRESS TEST RESULTS, HE IS ASYMPTOMATIC AND CURRENTLY TAKING AMLODIPINE WHICH MAY BE HELPFUL.  RECOMMEND REFERRAL TO CARDIOLOGY FOR FURTHER EVALUATION, NO ED MEDICATIONS PRESCRIBED AT THIS TIME.  ER PRECAUTIONS ARE ADVISED SHOULD HE HAVE ANY CHEST PAIN OR SHORTNESS FOR BREATH SYMPTOMS OR OTHER.  HE VERBALIZES UNDERSTANDING.

## 2024-09-05 NOTE — ASSESSMENT & PLAN NOTE
Continue follow-up with Neurology, planning to see Neurosurgery for discussion of upcoming lumbar spine procedure.  Prescribed Lyrica 3 times daily by specialists, fall precautions are advised.

## 2024-09-05 NOTE — ASSESSMENT & PLAN NOTE
Last visit:  He reports new symptoms of erectile dysfunction has been going on for the past year.  No known history of CAD or stents.  We discussed we should ensure his heart is healthy prior to prescribing a medication to address erectile dysfunction.  Would like to rule out any ischemic changes.  He may have erectile dysfunction related to his lumbar stenosis as well.  He agrees to have a stress test to rule out any ischemic changes and then we will follow-up in 1 month to discuss treatment options and review those results.    Today:   His stress test 2 days ago was abnormal with moderate risk with reversible ischemic changes, we will refer him to Cardiology for further evaluation.  He denies any active chest pain or shortness for breath symptoms.

## 2024-09-05 NOTE — PROGRESS NOTES
[facility] Medicine Clinic  Nicolás AMBER Chris, DNP     Patient ID: [id number]     Chief Complaint: Erectile Dysfunction (Test results, refused flu vaccine)      HPI:     Arcadio Scott is a 63 y.o. male here today for follow-up after stress test, he would like to be prescribed a medication for erectile dysfunction but agreed to have a stress test done before starting this type of medication.    Medical diagnosis include polyneuropathy, peripheral sensory motor axonal polyneuropathy (follows Neurology), lumbar degenerative disc disease, reading impairment, lumbar stenosis, alcohol abuse, hypertension, anemia, transaminitis, and history of GERD.           Health Maintenance         Date Due Completion Date    TETANUS VACCINE 10/09/2024 (Originally 11/3/1978) ---    Shingles Vaccine (1 of 2) 10/09/2024 (Originally 11/3/2011) ---    Influenza Vaccine (1) 06/30/2025 (Originally 9/1/2024) 10/9/2023 (Declined)    Override on 10/9/2023: Declined (Patient refused)    PROSTATE-SPECIFIC ANTIGEN 06/07/2025 6/7/2024    Hemoglobin A1c (Diabetic Prevention Screening) 10/09/2026 10/9/2023    Lipid Panel 10/09/2028 10/9/2023    Colorectal Cancer Screening 10/30/2033 10/30/2023            Past Medical History:   Diagnosis Date    GERD (gastroesophageal reflux disease)     Hypertension     Neuropathy     Unspecified glaucoma         Past Surgical History:   Procedure Laterality Date    CATARACT EXTRACTION W/  INTRAOCULAR LENS IMPLANT Right 8/13/2024    Procedure: EXTRACTION, CATARACT, WITH IOL INSERT- OD;  Surgeon: Duncan Arevalo MD;  Location: [facility] OR;  Service: Ophthalmology;  Laterality: Right;    COLONOSCOPY W/ POLYPECTOMY  01/28/2022    COLONOSCOPY, WITH POLYPECTOMY USING SNARE N/A 10/30/2023    Procedure: COLONOSCOPY, WITH POLYPECTOMY USING SNARE;  Surgeon: Ngoc Fenton MD;  Location: [facility] ENDOSCOPY;  Service: Gastroenterology;  Laterality: N/A;    CYST REMOVAL      Facial    EGD, WITH CLOSED BIOPSY N/A 10/30/2023     Procedure: EGD;  Surgeon: Ngoc Fenton MD;  Location: [facility] ENDOSCOPY;  Service: Gastroenterology;  Laterality: N/A;    SURGICAL REMOVAL OF LESION OF FACE Bilateral 07/18/2022    Procedure: EXCISION, LESION, FACE AND NECK;  Surgeon: Marcelo Capps MD;  Location: [facility] OR;  Service: ENT;  Laterality: Bilateral;        Social History     Tobacco Use    Smoking status: Former     Types: Cigars, Cigarettes     Passive exposure: Current    Smokeless tobacco: Never    Tobacco comments:     Quit cigarettes in 2019, smokes cigars 1-2 times a week   Substance and Sexual Activity    Alcohol use: Not Currently    Drug use: Never    Sexual activity: Not Currently        Current Outpatient Medications   Medication Instructions    amLODIPine (NORVASC) 10 mg, Oral, Daily    hydrocortisone 1 % cream Topical (Top), 2 times daily    pantoprazole (PROTONIX) 40 mg, Oral, Daily    potassium chloride SA (K-DUR,KLOR-CON) 20 MEQ tablet 20 mEq, Oral, Daily    pregabalin (LYRICA) 100 mg, Oral, 3 times daily    propylene glycol/peg 400/PF (SYSTANE, PF, OPHT) Ophthalmic    tamsulosin (FLOMAX) 0.4 mg, Oral, Daily       Review of patient's allergies indicates:  No Known Allergies     Patient Care Team:  Nicolás Perez FNP as PCP - General (Family Medicine)     Subjective:     Review of Systems   Constitutional:  Negative for appetite change, chills, diaphoresis and fever.   HENT:  Negative for ear pain, sinus pain and sore throat.    Eyes:  Negative for pain and visual disturbance.   Respiratory:  Negative for cough, shortness of breath and wheezing.    Cardiovascular:  Negative for chest pain, palpitations and leg swelling.   Gastrointestinal:  Negative for abdominal pain, blood in stool, diarrhea, nausea and vomiting.   Endocrine: Negative for cold intolerance.   Genitourinary:  Negative for difficulty urinating, dysuria, frequency and hematuria.   Musculoskeletal:  Positive for arthralgias. Negative for joint swelling and  "myalgias.   Skin:  Negative for color change and rash.   Allergic/Immunologic: Negative.    Neurological:  Positive for numbness. Negative for dizziness, syncope and light-headedness.   Hematological: Negative.    Psychiatric/Behavioral: Negative.  Negative for dysphoric mood and suicidal ideas. The patient is not nervous/anxious.    All other systems reviewed and are negative.      12 point review of systems conducted, negative except as stated in the history of present illness. See HPI for details.    Objective:     Visit Vitals  /71 (BP Location: Left arm, Patient Position: Sitting, BP Method: Medium (Automatic))   Pulse 63   Temp 97.7 °F (36.5 °C) (Oral)   Resp 18   Ht 5' 9.02" (1.753 m)   Wt 104.7 kg (230 lb 12.8 oz)   BMI 34.07 kg/m²       Physical Exam  Vitals and nursing note reviewed.   Constitutional:       General: He is not in acute distress.     Appearance: He is not ill-appearing.   HENT:      Head: Normocephalic and atraumatic.      Mouth/Throat:      Mouth: Mucous membranes are moist.      Pharynx: Oropharynx is clear.   Eyes:      General: No scleral icterus.     Extraocular Movements: Extraocular movements intact.      Conjunctiva/sclera: Conjunctivae normal.      Pupils: Pupils are equal, round, and reactive to light.   Neck:      Vascular: No carotid bruit.   Cardiovascular:      Rate and Rhythm: Normal rate and regular rhythm.      Heart sounds: No murmur heard.     No friction rub. No gallop.   Pulmonary:      Effort: Pulmonary effort is normal. No respiratory distress.      Breath sounds: Normal breath sounds. No wheezing, rhonchi or rales.   Abdominal:      General: Abdomen is flat. Bowel sounds are normal. There is no distension.      Palpations: Abdomen is soft. There is no mass.      Tenderness: There is no abdominal tenderness.   Musculoskeletal:         General: Normal range of motion.      Cervical back: Normal range of motion and neck supple.      Comments: AMBULATES WITH A QUAD " CANE ON THE LEFT SIDE   Skin:     General: Skin is warm and dry.   Neurological:      General: No focal deficit present.      Mental Status: He is alert.   Psychiatric:         Mood and Affect: Mood normal.         Labs Reviewed:     Chemistry:  Lab Results   Component Value Date     06/07/2024    K 3.4 (L) 06/07/2024    BUN 6.5 (L) 06/07/2024    CREATININE 0.80 06/07/2024    EGFRNORACEVR >60 06/07/2024    GLUCOSE 99 06/07/2024    CALCIUM 9.3 06/07/2024    ALKPHOS 55 06/07/2024    LABPROT 7.5 06/07/2024    ALBUMIN 3.4 06/07/2024    BILIDIR 0.3 02/14/2022    IBILI 0.60 02/14/2022    AST 17 06/07/2024    ALT 7 06/07/2024    MG 1.70 09/29/2023    PHOS 3.0 09/29/2023    TSH 0.497 10/09/2023    PMFLWM4RRCG 0.94 04/12/2023    PSA 0.56 06/07/2024        Lab Results   Component Value Date    HGBA1C 5.2 10/09/2023        Hematology:  Lab Results   Component Value Date    WBC 6.73 06/07/2024    HGB 13.3 (L) 06/07/2024    HCT 38.4 (L) 06/07/2024     06/07/2024       Lipid Panel:  Lab Results   Component Value Date    CHOL 173 10/09/2023    HDL 52 10/09/2023    .00 10/09/2023    TRIG 79 10/09/2023    TOTALCHOLEST 3 10/09/2023        Urine:  Lab Results   Component Value Date    APPEARANCEUA Turbid (A) 09/27/2023    SGUA 1.027 09/27/2023    PROTEINUA 3+ (A) 09/27/2023    KETONESUA 3+ (A) 09/27/2023    LEUKOCYTESUR 25 (A) 09/27/2023    RBCUA 6-10 (A) 09/27/2023    WBCUA 6-10 (A) 09/27/2023    BACTERIA Trace (A) 09/27/2023    SQEPUA Occ (A) 09/27/2023    HYALINECASTS >20 (A) 09/27/2023        Assessment:       ICD-10-CM ICD-9-CM   1. Abnormal stress test  R94.39 794.39   2. Peripheral sensory-motor axonal polyneuropathy  G60.8 356.8   3. Primary hypertension  I10 401.9   4. Erectile dysfunction, unspecified erectile dysfunction type  N52.9 607.84        Plan:     1. Abnormal stress test  Assessment & Plan:  Stress test 9/3/25         Abnormal myocardial perfusion scan.    There is a moderate intensity, medium  sized, reversible perfusion abnormality that is consistent with ischemia in the  basal to distal anterior wall(s) in the typical distribution of the LAD territory.    There are no other significant perfusion abnormalities.    The gated perfusion images showed an ejection fraction of 59% at rest. The gated perfusion images showed an ejection fraction of 59% post stress.    The patient reported no chest pain during the stress test.    There were no arrhythmias during stress.     The nuclear stress test is  fair quality.    On the nuclear stress test the EF is normal.  If the patient has an echo, the EF on the echo is considered more accurate.        The patient has a moderate risk nuclear stress test due to anterior reversible defect.     If patient is symptomatic, consider management with two anti-anginals    ABOVE IS HIS RECENT STRESS TEST RESULTS, HE IS ASYMPTOMATIC AND CURRENTLY TAKING AMLODIPINE WHICH MAY BE HELPFUL.  RECOMMEND REFERRAL TO CARDIOLOGY FOR FURTHER EVALUATION, NO ED MEDICATIONS PRESCRIBED AT THIS TIME.  ER PRECAUTIONS ARE ADVISED SHOULD HE HAVE ANY CHEST PAIN OR SHORTNESS FOR BREATH SYMPTOMS OR OTHER.  HE VERBALIZES UNDERSTANDING.    Orders:  -     Ambulatory referral/consult to Cardiology; Future; Expected date: 09/12/2024    2. Peripheral sensory-motor axonal polyneuropathy  Assessment & Plan:  Continue follow-up with Neurology, planning to see Neurosurgery for discussion of upcoming lumbar spine procedure.  Prescribed Lyrica 3 times daily by specialists, fall precautions are advised.       3. Primary hypertension  Overview:  Amlodipine 10 mg once daily    Assessment & Plan:  Goal BP < 140/90, best goal is BP <130/80 consistently   Reduce the amount of salt in your diet, follow a 2 gm sodium, DASH diet daily            Lose weight if you are overweight or have obesity  Avoid drinking too much alcohol  Stop smoking  Exercise at least 30 minutes per day most days of the week        4. Erectile  dysfunction, unspecified erectile dysfunction type  Assessment & Plan:  Last visit:  He reports new symptoms of erectile dysfunction has been going on for the past year.  No known history of CAD or stents.  We discussed we should ensure his heart is healthy prior to prescribing a medication to address erectile dysfunction.  Would like to rule out any ischemic changes.  He may have erectile dysfunction related to his lumbar stenosis as well.  He agrees to have a stress test to rule out any ischemic changes and then we will follow-up in 1 month to discuss treatment options and review those results.    Today:   His stress test 2 days ago was abnormal with moderate risk with reversible ischemic changes, we will refer him to Cardiology for further evaluation.  He denies any active chest pain or shortness for breath symptoms.           Follow up for follow up, with lab work prior to visit as scheduled 12/17/24. In addition to their scheduled follow up, the patient has also been instructed to follow up on as needed basis.     Future Appointments   Date Time Provider Department Center   10/17/2024  9:30 AM Felicita Ramirez ANP [facility] NEURO [place]    10/24/2024  2:30 PM Taco Schwartz MD [facility]   12/17/2024  1:20 PM Nicolás Perez FNP [facility]   2/17/2025  7:30 AM Chaitanya Rodriguez NP [facility] UROLO [place]    2/28/2025 10:00 AM Felicita Ramirez ANP [facility] NEURO [place]         AN [place]     Previously Declined (within the last year)

## 2024-09-20 ENCOUNTER — ANESTHESIA EVENT (OUTPATIENT)
Dept: SURGERY | Facility: HOSPITAL | Age: 64
End: 2024-09-20
Payer: MEDICAID

## 2024-09-23 NOTE — ANESTHESIA PREPROCEDURE EVALUATION
09/23/2024  Arcadio Scott is a 63 y.o., male.      Pre-op Assessment    I have reviewed the Patient Summary Reports.     I have reviewed the Nursing Notes. I have reviewed the NPO Status.   I have reviewed the Medications.     Review of Systems  Anesthesia Hx:  No problems with previous Anesthesia   History of prior surgery of interest to airway management or planning:  Previous anesthesia: General 8/24 Right cataract with general anesthesia.  Procedure performed at an [facility].          Social:  Former Smoker, No Alcohol Use       Hematology/Oncology:  Hematology Normal   Oncology Normal                                   EENT/Dental:   Left cataract Eyes: Visual Impairment   Has Left and S/P Extraction - Right Catarract                  Cardiovascular:     Hypertension              ECG has been reviewed. EKG reviewed from August 2024                   Hypertension         Pulmonary:  Pulmonary Normal                       Renal/:  Renal/ Normal                 Hepatic/GI:     GERD     Esophageal / Stomach Disorders Gerd          Musculoskeletal:  Arthritis             Lumbar Spine Disorders, Lumbar Disc Disease   Neurological:    Neuromuscular Disease,       Cronic Back Pain                          Neuromuscular Disease   Endocrine:        Obesity / BMI > 30  Psych:  Psychiatric History         Psychotic Disorder and Hx of Psychotic Disorder.          Physical Exam  General: Well nourished, Cooperative, Alert and Oriented    Airway:  Mallampati: II   Mouth Opening: Normal  TM Distance: Normal  Tongue: Normal  Neck ROM: Normal ROM    Dental:  Periodontal disease    Chest/Lungs:  Clear to auscultation, Normal Respiratory Rate    Heart:  Rate: Normal  Rhythm: Regular Rhythm        Anesthesia Plan  Type of Anesthesia, risks & benefits discussed:    Anesthesia Type: Gen Supraglottic Airway  Intra-op  Monitoring Plan: Standard ASA Monitors  Post Op Pain Control Plan: multimodal analgesia and IV/PO Opioids PRN  Induction:  IV  Airway Plan: , Post-Induction  Informed Consent: Informed consent signed with the Patient and all parties understand the risks and agree with anesthesia plan.  All questions answered. Patient consented to blood products? No  ASA Score: 3  Day of Surgery Review of History & Physical: H&P Update referred to the surgeon/provider.I have interviewed and examined the patient. I have reviewed the patient's H&P dated: 9/24/24. There are no significant changes.     Ready For Surgery From Anesthesia Perspective.     .

## 2024-09-24 ENCOUNTER — HOSPITAL ENCOUNTER (OUTPATIENT)
Facility: HOSPITAL | Age: 64
Discharge: HOME | End: 2024-09-24
Attending: OPHTHALMOLOGY | Admitting: OPHTHALMOLOGY
Payer: MEDICAID

## 2024-09-24 ENCOUNTER — ANESTHESIA (OUTPATIENT)
Dept: SURGERY | Facility: HOSPITAL | Age: 64
End: 2024-09-24
Payer: MEDICAID

## 2024-09-24 VITALS
SYSTOLIC BLOOD PRESSURE: 129 MMHG | WEIGHT: 229.5 LBS | RESPIRATION RATE: 23 BRPM | HEART RATE: 69 BPM | BODY MASS INDEX: 33.88 KG/M2 | TEMPERATURE: 98 F | DIASTOLIC BLOOD PRESSURE: 80 MMHG | OXYGEN SATURATION: 96 %

## 2024-09-24 DIAGNOSIS — H25.12 NUCLEAR SCLEROTIC CATARACT OF LEFT EYE: ICD-10-CM

## 2024-09-24 PROCEDURE — 63600175 PHARM REV CODE 636 W HCPCS: Performed by: NURSE ANESTHETIST, CERTIFIED REGISTERED

## 2024-09-24 PROCEDURE — 25000003 PHARM REV CODE 250

## 2024-09-24 PROCEDURE — 37000009 HC ANESTHESIA EA ADD 15 MINS: Performed by: OPHTHALMOLOGY

## 2024-09-24 PROCEDURE — 25000003 PHARM REV CODE 250: Performed by: OPHTHALMOLOGY

## 2024-09-24 PROCEDURE — 71000033 HC RECOVERY, INTIAL HOUR: Performed by: OPHTHALMOLOGY

## 2024-09-24 PROCEDURE — 27201423 OPTIME MED/SURG SUP & DEVICES STERILE SUPPLY: Performed by: OPHTHALMOLOGY

## 2024-09-24 PROCEDURE — 36000706: Performed by: OPHTHALMOLOGY

## 2024-09-24 PROCEDURE — 36000707: Performed by: OPHTHALMOLOGY

## 2024-09-24 PROCEDURE — V2632 POST CHMBR INTRAOCULAR LENS: HCPCS | Performed by: OPHTHALMOLOGY

## 2024-09-24 PROCEDURE — 37000008 HC ANESTHESIA 1ST 15 MINUTES: Performed by: OPHTHALMOLOGY

## 2024-09-24 PROCEDURE — 25000003 PHARM REV CODE 250: Performed by: NURSE ANESTHETIST, CERTIFIED REGISTERED

## 2024-09-24 PROCEDURE — 71000015 HC POSTOP RECOV 1ST HR: Performed by: OPHTHALMOLOGY

## 2024-09-24 DEVICE — IMPLANTABLE DEVICE: Type: IMPLANTABLE DEVICE | Site: EYE | Status: FUNCTIONAL

## 2024-09-24 RX ORDER — DICLOFENAC SODIUM 1 MG/ML
1 SOLUTION/ DROPS OPHTHALMIC
Status: COMPLETED | OUTPATIENT
Start: 2024-09-24 | End: 2024-09-24

## 2024-09-24 RX ORDER — MEPERIDINE HYDROCHLORIDE 25 MG/ML
12.5 INJECTION INTRAMUSCULAR; INTRAVENOUS; SUBCUTANEOUS ONCE AS NEEDED
Status: DISCONTINUED | OUTPATIENT
Start: 2024-09-24 | End: 2024-09-24 | Stop reason: HOSPADM

## 2024-09-24 RX ORDER — ONDANSETRON HYDROCHLORIDE 2 MG/ML
INJECTION, SOLUTION INTRAVENOUS
Status: DISCONTINUED | OUTPATIENT
Start: 2024-09-24 | End: 2024-09-24

## 2024-09-24 RX ORDER — DIPHENHYDRAMINE HYDROCHLORIDE 50 MG/ML
12.5 INJECTION INTRAMUSCULAR; INTRAVENOUS ONCE AS NEEDED
Status: DISCONTINUED | OUTPATIENT
Start: 2024-09-24 | End: 2024-09-24 | Stop reason: HOSPADM

## 2024-09-24 RX ORDER — BRIMONIDINE TARTRATE 1.5 MG/ML
1 SOLUTION/ DROPS OPHTHALMIC ONCE
Status: DISCONTINUED | OUTPATIENT
Start: 2024-09-24 | End: 2024-09-24 | Stop reason: HOSPADM

## 2024-09-24 RX ORDER — MOXIFLOXACIN 5 MG/ML
SOLUTION/ DROPS OPHTHALMIC
Status: DISCONTINUED | OUTPATIENT
Start: 2024-09-24 | End: 2024-09-24 | Stop reason: HOSPADM

## 2024-09-24 RX ORDER — POVIDONE-IODINE 5 %
SOLUTION, NON-ORAL OPHTHALMIC (EYE)
Status: DISCONTINUED | OUTPATIENT
Start: 2024-09-24 | End: 2024-09-24 | Stop reason: HOSPADM

## 2024-09-24 RX ORDER — PROPARACAINE HYDROCHLORIDE 5 MG/ML
1 SOLUTION/ DROPS OPHTHALMIC
Status: COMPLETED | OUTPATIENT
Start: 2024-09-24 | End: 2024-09-24

## 2024-09-24 RX ORDER — KETOROLAC TROMETHAMINE 5 MG/ML
SOLUTION OPHTHALMIC
Status: DISCONTINUED | OUTPATIENT
Start: 2024-09-24 | End: 2024-09-24 | Stop reason: HOSPADM

## 2024-09-24 RX ORDER — PROPOFOL 10 MG/ML
VIAL (ML) INTRAVENOUS
Status: DISCONTINUED | OUTPATIENT
Start: 2024-09-24 | End: 2024-09-24

## 2024-09-24 RX ORDER — TROPICAMIDE 10 MG/ML
1 SOLUTION/ DROPS OPHTHALMIC
Status: COMPLETED | OUTPATIENT
Start: 2024-09-24 | End: 2024-09-24

## 2024-09-24 RX ORDER — FENTANYL CITRATE 50 UG/ML
25 INJECTION, SOLUTION INTRAMUSCULAR; INTRAVENOUS EVERY 5 MIN PRN
Status: DISCONTINUED | OUTPATIENT
Start: 2024-09-24 | End: 2024-09-24 | Stop reason: HOSPADM

## 2024-09-24 RX ORDER — HYDROCODONE BITARTRATE AND ACETAMINOPHEN 5; 325 MG/1; MG/1
1 TABLET ORAL
Status: DISCONTINUED | OUTPATIENT
Start: 2024-09-24 | End: 2024-09-24 | Stop reason: HOSPADM

## 2024-09-24 RX ORDER — PHENYLEPHRINE HYDROCHLORIDE 25 MG/ML
1 SOLUTION/ DROPS OPHTHALMIC
Status: COMPLETED | OUTPATIENT
Start: 2024-09-24 | End: 2024-09-24

## 2024-09-24 RX ORDER — PREDNISOLONE ACETATE 10 MG/ML
SUSPENSION/ DROPS OPHTHALMIC
Status: DISCONTINUED | OUTPATIENT
Start: 2024-09-24 | End: 2024-09-24 | Stop reason: HOSPADM

## 2024-09-24 RX ORDER — GLUCAGON 1 MG
1 KIT INJECTION
Status: DISCONTINUED | OUTPATIENT
Start: 2024-09-24 | End: 2024-09-24 | Stop reason: HOSPADM

## 2024-09-24 RX ORDER — SODIUM CHLORIDE 9 MG/ML
INJECTION, SOLUTION INTRAVENOUS CONTINUOUS
Status: DISCONTINUED | OUTPATIENT
Start: 2024-09-24 | End: 2024-09-24 | Stop reason: HOSPADM

## 2024-09-24 RX ORDER — ONDANSETRON HYDROCHLORIDE 2 MG/ML
4 INJECTION, SOLUTION INTRAVENOUS DAILY PRN
Status: DISCONTINUED | OUTPATIENT
Start: 2024-09-24 | End: 2024-09-24 | Stop reason: HOSPADM

## 2024-09-24 RX ORDER — EPHEDRINE SULFATE 50 MG/ML
INJECTION, SOLUTION INTRAVENOUS
Status: DISCONTINUED | OUTPATIENT
Start: 2024-09-24 | End: 2024-09-24

## 2024-09-24 RX ORDER — ACETAMINOPHEN 325 MG/1
650 TABLET ORAL EVERY 4 HOURS PRN
OUTPATIENT
Start: 2024-09-24

## 2024-09-24 RX ORDER — LIDOCAINE HYDROCHLORIDE 20 MG/ML
INJECTION INTRAVENOUS
Status: DISCONTINUED | OUTPATIENT
Start: 2024-09-24 | End: 2024-09-24

## 2024-09-24 RX ORDER — PROPARACAINE HYDROCHLORIDE 5 MG/ML
SOLUTION/ DROPS OPHTHALMIC
Status: DISCONTINUED | OUTPATIENT
Start: 2024-09-24 | End: 2024-09-24 | Stop reason: HOSPADM

## 2024-09-24 RX ORDER — CYCLOPENTOLATE HYDROCHLORIDE 10 MG/ML
1 SOLUTION/ DROPS OPHTHALMIC
Status: COMPLETED | OUTPATIENT
Start: 2024-09-24 | End: 2024-09-24

## 2024-09-24 RX ADMIN — DICLOFENAC SODIUM 1 DROP: 1 SOLUTION/ DROPS OPHTHALMIC at 10:09

## 2024-09-24 RX ADMIN — TROPICAMIDE 1 DROP: 10 SOLUTION/ DROPS OPHTHALMIC at 10:09

## 2024-09-24 RX ADMIN — PHENYLEPHRINE HYDROCHLORIDE 1 DROP: 25 SOLUTION/ DROPS OPHTHALMIC at 10:09

## 2024-09-24 RX ADMIN — CYCLOPENTOLATE HYDROCHLORIDE 1 DROP: 10 SOLUTION/ DROPS OPHTHALMIC at 10:09

## 2024-09-24 RX ADMIN — LIDOCAINE HYDROCHLORIDE 50 MG: 20 INJECTION, SOLUTION INTRAVENOUS at 10:09

## 2024-09-24 RX ADMIN — EPHEDRINE SULFATE 15 MG: 50 INJECTION INTRAVENOUS at 11:09

## 2024-09-24 RX ADMIN — SODIUM CHLORIDE: 9 INJECTION, SOLUTION INTRAVENOUS at 10:09

## 2024-09-24 RX ADMIN — PROPARACAINE HYDROCHLORIDE 1 DROP: 5 SOLUTION/ DROPS OPHTHALMIC at 10:09

## 2024-09-24 RX ADMIN — ONDANSETRON 4 MG: 2 INJECTION INTRAMUSCULAR; INTRAVENOUS at 11:09

## 2024-09-24 RX ADMIN — PROPOFOL 150 MG: 10 INJECTION, EMULSION INTRAVENOUS at 10:09

## 2024-09-24 NOTE — ANESTHESIA PROCEDURE NOTES
Intubation    Date/Time: 9/24/2024 11:00 AM    Performed by: Merrill Huynh CRNA  Authorized by: Merrill Huynh CRNA    Intubation:     Induction:  Intravenous    Intubated:  Postinduction    Mask Ventilation:  Not attempted    Attempts:  1    Attempted By:  CRNA    Difficult Airway Encountered?: No      Complications:  None    Airway Device:  Supraglottic airway/LMA    Airway Device Size:  4.0    Style/Cuff Inflation:  Cuffed (inflated to minimal occlusive pressure)    Placement Verified By:  Capnometry    Complicating Factors:  None    Findings Post-Intubation:  BS equal bilateral

## 2024-09-24 NOTE — TRANSFER OF CARE
Anesthesia Transfer of Care Note    Patient: Arcadio Scott    Procedure(s) Performed: Procedure(s) (LRB):  EXTRACTION, CATARACT, WITH IOL INSERT- OS (Left)    Patient location: PACU    Anesthesia Type: general    Transport from OR: Transported from OR on room air with adequate spontaneous ventilation    Post pain: adequate analgesia    Post assessment: no apparent anesthetic complications    Post vital signs: stable    Level of consciousness: sedated    Nausea/Vomiting: no nausea/vomiting    Complications: none    Transfer of care protocol was followedComments: /71  HR 67  RR 18  O2 Sat 99  Temp 36.2      Last vitals: Visit Vitals  /79   Pulse 76   Temp 36.7 °C (98.1 °F)   Resp 20   Wt 104.1 kg (229 lb 8 oz)   SpO2 98%   BMI 33.88 kg/m²     
73

## 2024-09-24 NOTE — DISCHARGE SUMMARY
[facility]  Discharge Note  Short Stay    Procedure(s) (LRB):  Extraction, Cataract w/ IOL (Complex) (Left)      OUTCOME: Patient tolerated treatment/procedure well without complication and is now ready for discharge.    DISPOSITION: Home or Self Care    FINAL DIAGNOSIS:  <principal problem not specified>pseudophakia    FOLLOWUP: In clinic    DISCHARGE INSTRUCTIONS:  No discharge procedures on file.      Clinical Reference Documents Added to Patient Instructions         Document    CATARACT REMOVAL DISCHARGE INSTRUCTIONS (ENGLISH)            TIME SPENT ON DISCHARGE: 5 minutes

## 2024-09-24 NOTE — DISCHARGE INSTRUCTIONS
**IMPORTANT**    Start eye drops at home TODAY:  Ofloxacin 4x/day in surgery eye for one week, then discontinue.  Diclofinac/Ketorolac 4x/day in surgery eye for one week, then 2x/day until it runs out.  Prednisolone 4x/day for one week, then 2x/day until it runs out.     **DO NOT TOUCH THE TIP OF THE BOTTLE WITH YOUR FINGERS OR TO YOUR FACE OR EYE**    It is common to have mild discomfort after surgery, often described as a little headache, scratchy feeling or feeling like something in the eye.  However, if you have Severe Pain that is not  helped by your usual headache or pain medications such as (Tylenol/Acetaminophen) (NO ASPIRIN), Please call our OFFICE at [phone].      2. You may eat a light meal, get some rest and take it easy the evening after your surgery.  You may read, watch TV, play cards, etc.  No Strenous Exercise for the next week.    3. Use the Eye Shield at night or whenever you sleep for the next two weeks.    4.  Showers or baths are permitted starting the day after surgery.    5.  DO NOT lift anything heavier than 15 - 20 pounds or bend below your waist for the next 7 days.    6. No cooking or cleaning for the next 7 days.    7. Dilated pupil may have blurry/cloudy vision caused by medication. If you start to experience floaters       you should sit in an upright position.    8. Wear your shades for the next 3 days to protect your eyes from the sun and bright light.    9. For your safety, a friend or family member should remain with you at least 24 hours post op or until you are more aware and alert.    10. You will need someone to drive you to your post op appointment.    11. Please avoid ni or smokey environments.    12. Possible signs of infection include fever, swelling, heat, drainage, redness. If you have any of these symptoms, please call our office.     DO NOT RUB YOUR EYE!!!

## 2024-09-24 NOTE — H&P
[facility] Services  Ophthalmology  History and Physical    Patient Name: Arcadio Scott  MRN: [id number]  Admission Date: 9/24/2024  Hospital Length of Stay: 0 days  Attending Provider: No att. providers found   Primary Care Physician: Nicolás Perez FNP  Principal Problem:<principal problem not specified>    Subjective:     Chief Complaint: decreased vision OS      Current Facility-Administered Medications on File Prior to Encounter   Medication    lactated ringers infusion    LIDOcaine (PF) 10 mg/ml (1%) injection 10 mg     Current Outpatient Medications on File Prior to Encounter   Medication Sig    amLODIPine (NORVASC) 10 MG tablet Take 1 tablet (10 mg total) by mouth once daily.    hydrocortisone 1 % cream Apply topically 2 (two) times daily.    pantoprazole (PROTONIX) 40 MG tablet Take 1 tablet (40 mg total) by mouth once daily.    potassium chloride SA (K-DUR,KLOR-CON) 20 MEQ tablet Take 1 tablet (20 mEq total) by mouth once daily.    pregabalin (LYRICA) 100 MG capsule Take 1 capsule (100 mg total) by mouth 3 (three) times daily.    propylene glycol/peg 400/PF (SYSTANE, PF, OPHT) Apply to eye.    tamsulosin (FLOMAX) 0.4 mg Cap Take 1 capsule (0.4 mg total) by mouth once daily.       Past Medical History:   Diagnosis Date    GERD (gastroesophageal reflux disease)     Hypertension     Neuropathy     Unspecified glaucoma        Past Surgical History:   Procedure Laterality Date    CATARACT EXTRACTION W/  INTRAOCULAR LENS IMPLANT Right 8/13/2024    Procedure: EXTRACTION, CATARACT, WITH IOL INSERT- OD;  Surgeon: Duncan Arevalo MD;  Location: [facility] OR;  Service: Ophthalmology;  Laterality: Right;    COLONOSCOPY W/ POLYPECTOMY  01/28/2022    COLONOSCOPY, WITH POLYPECTOMY USING SNARE N/A 10/30/2023    Procedure: COLONOSCOPY, WITH POLYPECTOMY USING SNARE;  Surgeon: Ngoc Fenton MD;  Location: [facility] ENDOSCOPY;  Service: Gastroenterology;  Laterality: N/A;    CYST REMOVAL      Facial     EGD, WITH CLOSED BIOPSY N/A 10/30/2023    Procedure: EGD;  Surgeon: Ngoc Fenton MD;  Location: [facility] ENDOSCOPY;  Service: Gastroenterology;  Laterality: N/A;    SURGICAL REMOVAL OF LESION OF FACE Bilateral 07/18/2022    Procedure: EXCISION, LESION, FACE AND NECK;  Surgeon: Marcelo Capps MD;  Location: [facility] OR;  Service: ENT;  Laterality: Bilateral;       Review of patient's allergies indicates:  No Known Allergies      Family History       Problem Relation (Age of Onset)    Alzheimer's disease Maternal Grandmother    Aneurysm Sister    Cancer Father    Diabetes Mother, Brother    Heart disease Mother    Stomach cancer Brother          Tobacco Use    Smoking status: Former     Types: Cigars, Cigarettes     Passive exposure: Current    Smokeless tobacco: Never    Tobacco comments:     Quit cigarettes in 2019, smokes cigars 1-2 times a week   Substance and Sexual Activity    Alcohol use: Not Currently    Drug use: Never    Sexual activity: Not Currently     Review of Systems  Objective:     Vital Signs (Most Recent):  Temp: 97.7 °F (36.5 °C) (09/24/24 1144)  Pulse: 69 (09/24/24 1150)  Resp: (!) 23 (09/24/24 1150)  BP: 129/80 (09/24/24 1150)  SpO2: 96 % (09/24/24 1150) Vital Signs (24h Range):  Temp:  [97.2 °F (36.2 °C)-98.1 °F (36.7 °C)] 97.7 °F (36.5 °C)  Pulse:  [67-76] 69  Resp:  [18-23] 23  SpO2:  [96 %-100 %] 96 %  BP: (127-137)/(71-80) 129/80     Weight: 104.1 kg (229 lb 8 oz) (09/24/24 0919)  Body mass index is 33.88 kg/m².    Not recorded       Physical Exam  HENT:      Head: Normocephalic.      Nose: Nose normal.      Mouth/Throat:      Mouth: Mucous membranes are moist.   Eyes:      Comments: Cataract OS   Cardiovascular:      Rate and Rhythm: Normal rate and regular rhythm.   Pulmonary:      Effort: Pulmonary effort is normal.      Breath sounds: Normal breath sounds.   Abdominal:      General: Abdomen is flat.      Palpations: Abdomen is soft.   Musculoskeletal:         General: Normal range of  motion.   Skin:     General: Skin is warm and dry.   Neurological:      General: No focal deficit present.      Mental Status: He is alert.   Psychiatric:         Mood and Affect: Mood normal.         Assessment/Plan:     There are no hospital problems to display for this patient.      Cataract OS  Patient is Ok for surgery    Duncan Arevalo MD  Ophthalmology  [facility]

## 2024-09-24 NOTE — OP NOTE
Operative Note  Ophthalmology Service    Date of Procedure:  9/24/2024    Surgeon:   Duncan Arevalo MD    Staff Physician: Duncan Arevalo MD    Pre-Operative Diagnosis:   Nuclear sclerotic cataract of left eye [H25.12]     Post-Operative Diagnosis: Same as pre-operative diagnosis    Treatments/Procedures Performed:   Procedure(s) (LRB):  EXTRACTION, CATARACT, WITH IOL INSERT- OS (Left)     Intraoperative Findings: Cataract    Anesthesia: GETA    Complications: None    Estimated Blood Loss: None    Implant:    Implant Name Type Inv. Item Serial No.  Lot No. LRB No. Used Action   [employer] IOL   [id number]  Left 1 Implanted        Procedure in detail: The patient had a history of painless progressive vision loss interfering with activities of daily living.  Risks, benefits, alternatives, and complications were discussed thoroughly with the patient and the patient expressed understanding and a desire to proceed with the procedure. Informed consent was obtained, signed, and witnessed, and placed in the chart prior.     The patient was brought to the operating room and placed in supine position.  A time out was performed including patient's name, date of birth, anticipated procedure, surgical site location, and allergies.  After adequate anesthesia was achieved, the patient was prepped and draped in sterile fashion using topical Betadine. A sideport blade was used to make a paracentesis wound. Preservative free lidocaine was injected into the anterior chamber, followed by Amvisc. A 2.4 mm keratome blade was then used to make a clear corneal triplanar wound. A cystotome was used to make a tear in the anterior capsular flap, which was continued around with Utrata forceps to complete a continuous curvilinear capsulorhexis. BSS was then used for hydrodissection. The lens was noted to spin freely in the bag. The crystalline lens was then removed in a [employer] manner with the  phacoemulsification handpiece. Irrigation and aspiration handpiece was then used to remove the remaining cortical material. Amvisc was then used to fill the capsular bag and the lens as mentioned above was placed in the bag. The I&A was used to remove the remaining amvisc, and the wounds were hydrated with BSS. The wounds were noted to be watertight. The eye was noted to have a good physiological pressure. The lid speculum was removed under direct visualization. Topical Vigamox, Pred-Forte, and flurbiprofen eye drops were placed in the eye. The eye was then covered with a shield and patch. The patient tolerated the procedure well without complications. The patient was brought to the recovery room in stable condition.  The patient was given instructions regarding postoperative care and the importance of follow-up.

## 2024-09-24 NOTE — ANESTHESIA POSTPROCEDURE EVALUATION
Anesthesia Post Evaluation    Patient: Arcadio Scott    Procedure(s) Performed: Procedure(s) (LRB):  EXTRACTION, CATARACT, WITH IOL INSERT- OS (Left)    Final Anesthesia Type: general      Patient location during evaluation: PACU  Patient participation: Yes- Able to Participate  Level of consciousness: awake and alert and oriented  Post-procedure vital signs: reviewed and stable  Pain management: adequate  Airway patency: patent    PONV status at discharge: No PONV  Anesthetic complications: no      Cardiovascular status: stable  Respiratory status: unassisted, spontaneous ventilation and room air  Hydration status: euvolemic  Follow-up not needed.              Vitals Value Taken Time   /75 09/24/24 1151   Temp 36.5 °C (97.7 °F) 09/24/24 1144   Pulse 68 09/24/24 1150   Resp 23 09/24/24 1150   SpO2 97 % 09/24/24 1150   Vitals shown include unfiled device data.      Event Time   Out of Recovery 11:51:58         Pain/Rosanna Score: Rosanna Score: 10 (9/24/2024 11:43 AM)

## 2024-09-26 DIAGNOSIS — Z12.11 COLON CANCER SCREENING: Primary | ICD-10-CM

## 2024-09-26 RX ORDER — POLYETHYLENE GLYCOL 3350, SODIUM SULFATE, SODIUM CHLORIDE, POTASSIUM CHLORIDE, SODIUM ASCORBATE, AND ASCORBIC ACID 7.5-2.691G
KIT ORAL
Qty: 1 KIT | Refills: 0 | Status: SHIPPED | OUTPATIENT
Start: 2024-09-26

## 2024-09-27 ENCOUNTER — OFFICE VISIT (OUTPATIENT)
Dept: CARDIOLOGY | Facility: CLINIC | Age: 64
End: 2024-09-27
Payer: MEDICAID

## 2024-09-27 VITALS
HEIGHT: 69 IN | SYSTOLIC BLOOD PRESSURE: 111 MMHG | HEART RATE: 74 BPM | OXYGEN SATURATION: 100 % | WEIGHT: 234 LBS | TEMPERATURE: 98 F | BODY MASS INDEX: 34.66 KG/M2 | RESPIRATION RATE: 18 BRPM | DIASTOLIC BLOOD PRESSURE: 70 MMHG

## 2024-09-27 DIAGNOSIS — R94.39 ABNORMAL STRESS TEST: ICD-10-CM

## 2024-09-27 DIAGNOSIS — I10 HYPERTENSION, UNSPECIFIED TYPE: Primary | ICD-10-CM

## 2024-09-27 PROCEDURE — 99215 OFFICE O/P EST HI 40 MIN: CPT | Mod: PBBFAC | Performed by: INTERNAL MEDICINE

## 2024-09-27 RX ORDER — KETOROLAC TROMETHAMINE 5 MG/ML
1 SOLUTION OPHTHALMIC 4 TIMES DAILY
COMMUNITY

## 2024-09-27 RX ORDER — OFLOXACIN 3 MG/ML
1 SOLUTION/ DROPS OPHTHALMIC 4 TIMES DAILY
COMMUNITY
Start: 2024-09-06

## 2024-09-27 RX ORDER — PREDNISOLONE ACETATE 10 MG/ML
1 SUSPENSION/ DROPS OPHTHALMIC 4 TIMES DAILY
COMMUNITY
Start: 2024-09-06

## 2024-09-27 RX ORDER — SODIUM CHLORIDE 0.9 % (FLUSH) 0.9 %
10 SYRINGE (ML) INJECTION
Status: SHIPPED | OUTPATIENT
Start: 2024-09-27

## 2024-09-27 RX ORDER — MIRABEGRON 25 MG/1
25 TABLET, FILM COATED, EXTENDED RELEASE ORAL DAILY
COMMUNITY

## 2024-09-27 RX ORDER — MOXIFLOXACIN 5 MG/ML
1 SOLUTION/ DROPS OPHTHALMIC 3 TIMES DAILY
COMMUNITY

## 2024-09-27 RX ORDER — SODIUM CHLORIDE 9 MG/ML
INJECTION, SOLUTION INTRAVENOUS ONCE
OUTPATIENT
Start: 2024-09-27 | End: 2024-09-27

## 2024-09-27 NOTE — H&P (VIEW-ONLY)
Cardiology clinic Note     CHIEF COMPLAINT:   Chief Complaint   Patient presents with    positive stress test, new pt      Occasional sob                    Review of patient's allergies indicates:  No Known Allergies                                       HPI:  Arcadio Scott 63 y.o. male with PMH of HTN, Prostatism, GERD came to the initial Cardiology clinic today for Abnormal stress test.      Patient says that he is having shortness of breath on exertion since 3 yrs. He says that he can walk a block to and fro before taking a break now, previously 3 yrs ago he was very active without any dyspnea. Pt ambulates using a cane. Denies any chest pain, palpitations, chest heaviness in the past. Patient does complain of pedal edema occasionally. Denies any orthopnea, PND, bendopnea. Patient reported that he wanted to start on erectile dysfunction medication, where the PCP advised to get a stress test done before initiating the medication, which resulted In abnormal reading. Reports complaince to his medications.     Has a history of smoking pack every 2-3 days, for 15 years. Does smoke a cigar every 2-3 day.  Does drink 0-2 beers everyday.  Denies any recreational drug use    Family history: MI in Mother at 70 ( )                          Father : Lung cancer ()                                                                                                                                                                                                                                                                                                                                                                                                                                                                                    CARDIAC TESTING:  No results found for this or any previous visit.    Results for orders placed during the hospital encounter of  09/03/24    Nuclear Stress - Cardiology Interpreted    Interpretation Summary    Abnormal myocardial perfusion scan.    There is a moderate intensity, medium sized, reversible perfusion abnormality that is consistent with ischemia in the  basal to distal anterior wall(s) in the typical distribution of the LAD territory.    There are no other significant perfusion abnormalities.    The gated perfusion images showed an ejection fraction of 59% at rest. The gated perfusion images showed an ejection fraction of 59% post stress.    The patient reported no chest pain during the stress test.    There were no arrhythmias during stress.    The nuclear stress test is  fair quality.  On the nuclear stress test the EF is normal.  If the patient has an echo, the EF on the echo is considered more accurate.    The patient has a moderate risk nuclear stress test due to anterior reversible defect.    If patient is symptomatic, consider management with two anti-anginals     No results found for this or any previous visit.       Patient Active Problem List   Diagnosis    Mass of soft tissue of neck    Epidermal cyst of face    Epidermal cyst of neck    Screening for malignant neoplasm of prostate    Chronic bilateral low back pain without sciatica    Polyneuropathy    Peripheral sensory-motor axonal polyneuropathy    Personal history of colonic polyps    Nausea and vomiting    Alcohol use    Hypokalemia    Microscopic hematuria    Transaminitis    Hypertension    Intractable vomiting    DDD (degenerative disc disease), lumbar    Reading impairment    Fall    Anemia    H. pylori infection    BPH (benign prostatic hyperplasia)    OAB (overactive bladder)    Nocturia    Spinal stenosis of lumbar region without neurogenic claudication    Erectile dysfunction    Abnormal stress test     Past Surgical History:   Procedure Laterality Date    CATARACT EXTRACTION W/  INTRAOCULAR LENS IMPLANT Right 8/13/2024    Procedure: EXTRACTION, CATARACT,  WITH IOL INSERT- OD;  Surgeon: Duncan Arevalo MD;  Location: [facility] OR;  Service: Ophthalmology;  Laterality: Right;    CATARACT EXTRACTION W/  INTRAOCULAR LENS IMPLANT Left 9/24/2024    Procedure: EXTRACTION, CATARACT, WITH IOL INSERT- OS;  Surgeon: Duncan Arevalo MD;  Location: [facility] OR;  Service: Ophthalmology;  Laterality: Left;    COLONOSCOPY W/ POLYPECTOMY  01/28/2022    COLONOSCOPY, WITH POLYPECTOMY USING SNARE N/A 10/30/2023    Procedure: COLONOSCOPY, WITH POLYPECTOMY USING SNARE;  Surgeon: Ngoc Fenton MD;  Location: [facility] ENDOSCOPY;  Service: Gastroenterology;  Laterality: N/A;    CYST REMOVAL      Facial    EGD, WITH CLOSED BIOPSY N/A 10/30/2023    Procedure: EGD;  Surgeon: Ngoc Fenton MD;  Location: [facility] ENDOSCOPY;  Service: Gastroenterology;  Laterality: N/A;    SURGICAL REMOVAL OF LESION OF FACE Bilateral 07/18/2022    Procedure: EXCISION, LESION, FACE AND NECK;  Surgeon: Marcelo Capps MD;  Location: [facility] OR;  Service: ENT;  Laterality: Bilateral;     Social History     Socioeconomic History    Marital status: Single    Number of children: 0   Tobacco Use    Smoking status: Former     Types: Cigars, Cigarettes     Passive exposure: Current    Smokeless tobacco: Never    Tobacco comments:     Quit cigarettes in 2019, smokes cigars 1-2 times a week   Substance and Sexual Activity    Alcohol use: Not Currently     Comment: occasional    Drug use: Never    Sexual activity: Not Currently     Social Determinants of Health     Financial Resource Strain: Low Risk  (9/28/2023)    Overall Financial Resource Strain (CARDIA)     Difficulty of Paying Living Expenses: Not hard at all   Food Insecurity: No Food Insecurity (9/28/2023)    Hunger Vital Sign     Worried About Running Out of Food in the Last Year: Never true     Ran Out of Food in the Last Year: Never true   Transportation Needs: No Transportation Needs (9/28/2023)    PRAPARE - Transportation     Lack of Transportation  (Medical): No     Lack of Transportation (Non-Medical): No   Physical Activity: Inactive (9/28/2023)    Exercise Vital Sign     Days of Exercise per Week: 0 days     Minutes of Exercise per Session: 0 min   Stress: No Stress Concern Present (9/28/2023)    [employer] [employer] - Occupational Stress Questionnaire     Feeling of Stress : Not at all   Housing Stability: Low Risk  (6/17/2024)    Housing Stability Vital Sign     Unable to Pay for Housing in the Last Year: No     Homeless in the Last Year: No        Family History   Problem Relation Name Age of Onset    Diabetes Mother      Heart disease Mother      Cancer Father          Unsure of source    Aneurysm Sister      Diabetes Brother      Stomach cancer Brother      Alzheimer's disease Maternal Grandmother           Current Outpatient Medications:     ofloxacin (OCUFLOX) 0.3 % ophthalmic solution, Place 1 drop into the left eye 4 (four) times daily., Disp: , Rfl:     prednisoLONE acetate (PRED FORTE) 1 % DrpS, Place 1 drop into the left eye 4 (four) times daily., Disp: , Rfl:     amLODIPine (NORVASC) 10 MG tablet, Take 1 tablet (10 mg total) by mouth once daily., Disp: 90 tablet, Rfl: 3    hydrocortisone 1 % cream, Apply topically 2 (two) times daily., Disp: 30 g, Rfl: 0    pantoprazole (PROTONIX) 40 MG tablet, Take 1 tablet (40 mg total) by mouth once daily., Disp: 30 tablet, Rfl: 11    polyethylene glycol (MOVIPREP) 100-7.5-2.691 gram solution, Take as directed prior to colonoscopy, Disp: 1 kit, Rfl: 0    potassium chloride SA (K-DUR,KLOR-CON) 20 MEQ tablet, Take 1 tablet (20 mEq total) by mouth once daily., Disp: 90 tablet, Rfl: 1    pregabalin (LYRICA) 100 MG capsule, Take 1 capsule (100 mg total) by mouth 3 (three) times daily., Disp: 90 capsule, Rfl: 1    propylene glycol/peg 400/PF (SYSTANE, PF, OPHT), Apply to eye., Disp: , Rfl:     tamsulosin (FLOMAX) 0.4 mg Cap, Take 1 capsule (0.4 mg total) by mouth once daily., Disp: 90 capsule,  "Rfl: 3  No current facility-administered medications for this visit.    Facility-Administered Medications Ordered in Other Visits:     lactated ringers infusion, , Intravenous, Continuous, Deanna Gaona MD, Last Rate: 10 mL/hr at 10/30/23 0808, New Bag at 10/30/23 0808    LIDOcaine (PF) 10 mg/ml (1%) injection 10 mg, 1 mL, Intradermal, Once, Marina De La Torre, FNP     ROS:                                                                                                                                                                             General: Denies fever, no night sweats, chills, fatigue, changes in weight.  Skin: Denies rashes, bruises, petechia  HEENT: Denies headache, head injury, no acute vision changes.  CVS: Denies chest pain, palpitations, orthopnea, PND, + edema  Resp: Denies SOB, cough, wheezing  GI: Denies dysphagia, melena, hematochezia, abdominal pain, nausea, vomiting, constipation.  : Denies dysuria, hematuria, polyuria, CVA pain, nocturia  Musculoskeletal: Denies joint stiffness, pain, swelling or weakness  Neuro: Denies headaches, syncope, seizures, numbness, tingling, vertigo, dizziness      Vitals:  Blood pressure 111/70, pulse 74, temperature 98.2 °F (36.8 °C), temperature source Oral, resp. rate 18, height 5' 9" (1.753 m), weight 106.1 kg (234 lb), SpO2 100%.       PE:  Physical Exam  Constitutional:       Appearance: Normal appearance. He is obese.   HENT:      Head: Normocephalic and atraumatic.      Nose: Nose normal.      Mouth/Throat:      Mouth: Mucous membranes are moist.      Pharynx: Oropharynx is clear.   Eyes:      Extraocular Movements: Extraocular movements intact.      Conjunctiva/sclera: Conjunctivae normal.      Pupils: Pupils are equal, round, and reactive to light.   Cardiovascular:      Rate and Rhythm: Normal rate and regular rhythm.      Pulses: Normal pulses.      Heart sounds: Normal heart sounds. No murmur heard.     No friction rub. No gallop. "   Pulmonary:      Effort: Pulmonary effort is normal.      Breath sounds: Normal breath sounds.   Abdominal:      General: Abdomen is flat. Bowel sounds are normal.      Palpations: Abdomen is soft.   Musculoskeletal:         General: No swelling. Normal range of motion.      Cervical back: Normal range of motion and neck supple.   Skin:     General: Skin is warm and dry.      Capillary Refill: Capillary refill takes less than 2 seconds.   Neurological:      General: No focal deficit present.      Mental Status: He is alert and oriented to person, place, and time. Mental status is at baseline.          ASSESSMENT/PLAN:    Abnormal stress test  HTN   - Today at goal at 111/70, on amlodipine 10mg daily  - Nuclear stress test 9/3/24 showed moderate sized moderate reversible perfusion defect in LAD region.   -  LDL was 105, 11 months ago, not at goal.  - Plan to do a Echo for structural abnormalities and LHC for CAD        Mine Chan MD  Internal Medicine - PGY-1

## 2024-09-27 NOTE — PROGRESS NOTES
Cardiology clinic Note     CHIEF COMPLAINT:   Chief Complaint   Patient presents with    positive stress test, new pt      Occasional sob                    Review of patient's allergies indicates:  No Known Allergies                                       HPI:  Arcadio Scott 63 y.o. male with PMH of HTN, Prostatism, GERD came to the initial Cardiology clinic today for Abnormal stress test.      Patient says that he is having shortness of breath on exertion since 3 yrs. He says that he can walk a block to and fro before taking a break now, previously 3 yrs ago he was very active without any dyspnea. Pt ambulates using a cane. Denies any chest pain, palpitations, chest heaviness in the past. Patient does complain of pedal edema occasionally. Denies any orthopnea, PND, bendopnea. Patient reported that he wanted to start on erectile dysfunction medication, where the PCP advised to get a stress test done before initiating the medication, which resulted In abnormal reading. Reports complaince to his medications.     Has a history of smoking pack every 2-3 days, for 15 years. Does smoke a cigar every 2-3 day.  Does drink 0-2 beers everyday.  Denies any recreational drug use    Family history: MI in Mother at 70 ( )                          Father : Lung cancer ()                                                                                                                                                                                                                                                                                                                                                                                                                                                                                    CARDIAC TESTING:  No results found for this or any previous visit.    Results for orders placed during the hospital encounter of  09/03/24    Nuclear Stress - Cardiology Interpreted    Interpretation Summary    Abnormal myocardial perfusion scan.    There is a moderate intensity, medium sized, reversible perfusion abnormality that is consistent with ischemia in the  basal to distal anterior wall(s) in the typical distribution of the LAD territory.    There are no other significant perfusion abnormalities.    The gated perfusion images showed an ejection fraction of 59% at rest. The gated perfusion images showed an ejection fraction of 59% post stress.    The patient reported no chest pain during the stress test.    There were no arrhythmias during stress.    The nuclear stress test is  fair quality.  On the nuclear stress test the EF is normal.  If the patient has an echo, the EF on the echo is considered more accurate.    The patient has a moderate risk nuclear stress test due to anterior reversible defect.    If patient is symptomatic, consider management with two anti-anginals     No results found for this or any previous visit.       Patient Active Problem List   Diagnosis    Mass of soft tissue of neck    Epidermal cyst of face    Epidermal cyst of neck    Screening for malignant neoplasm of prostate    Chronic bilateral low back pain without sciatica    Polyneuropathy    Peripheral sensory-motor axonal polyneuropathy    Personal history of colonic polyps    Nausea and vomiting    Alcohol use    Hypokalemia    Microscopic hematuria    Transaminitis    Hypertension    Intractable vomiting    DDD (degenerative disc disease), lumbar    Reading impairment    Fall    Anemia    H. pylori infection    BPH (benign prostatic hyperplasia)    OAB (overactive bladder)    Nocturia    Spinal stenosis of lumbar region without neurogenic claudication    Erectile dysfunction    Abnormal stress test     Past Surgical History:   Procedure Laterality Date    CATARACT EXTRACTION W/  INTRAOCULAR LENS IMPLANT Right 8/13/2024    Procedure: EXTRACTION, CATARACT,  WITH IOL INSERT- OD;  Surgeon: Duncan Arevalo MD;  Location: [facility] OR;  Service: Ophthalmology;  Laterality: Right;    CATARACT EXTRACTION W/  INTRAOCULAR LENS IMPLANT Left 9/24/2024    Procedure: EXTRACTION, CATARACT, WITH IOL INSERT- OS;  Surgeon: Duncan Arevalo MD;  Location: [facility] OR;  Service: Ophthalmology;  Laterality: Left;    COLONOSCOPY W/ POLYPECTOMY  01/28/2022    COLONOSCOPY, WITH POLYPECTOMY USING SNARE N/A 10/30/2023    Procedure: COLONOSCOPY, WITH POLYPECTOMY USING SNARE;  Surgeon: Ngoc Fenton MD;  Location: [facility] ENDOSCOPY;  Service: Gastroenterology;  Laterality: N/A;    CYST REMOVAL      Facial    EGD, WITH CLOSED BIOPSY N/A 10/30/2023    Procedure: EGD;  Surgeon: Ngoc Fenton MD;  Location: [facility] ENDOSCOPY;  Service: Gastroenterology;  Laterality: N/A;    SURGICAL REMOVAL OF LESION OF FACE Bilateral 07/18/2022    Procedure: EXCISION, LESION, FACE AND NECK;  Surgeon: Marcelo Capps MD;  Location: [facility] OR;  Service: ENT;  Laterality: Bilateral;     Social History     Socioeconomic History    Marital status: Single    Number of children: 0   Tobacco Use    Smoking status: Former     Types: Cigars, Cigarettes     Passive exposure: Current    Smokeless tobacco: Never    Tobacco comments:     Quit cigarettes in 2019, smokes cigars 1-2 times a week   Substance and Sexual Activity    Alcohol use: Not Currently     Comment: occasional    Drug use: Never    Sexual activity: Not Currently     Social Determinants of Health     Financial Resource Strain: Low Risk  (9/28/2023)    Overall Financial Resource Strain (CARDIA)     Difficulty of Paying Living Expenses: Not hard at all   Food Insecurity: No Food Insecurity (9/28/2023)    Hunger Vital Sign     Worried About Running Out of Food in the Last Year: Never true     Ran Out of Food in the Last Year: Never true   Transportation Needs: No Transportation Needs (9/28/2023)    PRAPARE - Transportation     Lack of Transportation  (Medical): No     Lack of Transportation (Non-Medical): No   Physical Activity: Inactive (9/28/2023)    Exercise Vital Sign     Days of Exercise per Week: 0 days     Minutes of Exercise per Session: 0 min   Stress: No Stress Concern Present (9/28/2023)    [employer] [place] [employer] - Occupational Stress Questionnaire     Feeling of Stress : Not at all   Housing Stability: Low Risk  (6/17/2024)    Housing Stability Vital Sign     Unable to Pay for Housing in the Last Year: No     Homeless in the Last Year: No        Family History   Problem Relation Name Age of Onset    Diabetes Mother      Heart disease Mother      Cancer Father          Unsure of source    Aneurysm Sister      Diabetes Brother      Stomach cancer Brother      Alzheimer's disease Maternal Grandmother           Current Outpatient Medications:     ofloxacin (OCUFLOX) 0.3 % ophthalmic solution, Place 1 drop into the left eye 4 (four) times daily., Disp: , Rfl:     prednisoLONE acetate (PRED FORTE) 1 % DrpS, Place 1 drop into the left eye 4 (four) times daily., Disp: , Rfl:     amLODIPine (NORVASC) 10 MG tablet, Take 1 tablet (10 mg total) by mouth once daily., Disp: 90 tablet, Rfl: 3    hydrocortisone 1 % cream, Apply topically 2 (two) times daily., Disp: 30 g, Rfl: 0    pantoprazole (PROTONIX) 40 MG tablet, Take 1 tablet (40 mg total) by mouth once daily., Disp: 30 tablet, Rfl: 11    polyethylene glycol (MOVIPREP) 100-7.5-2.691 gram solution, Take as directed prior to colonoscopy, Disp: 1 kit, Rfl: 0    potassium chloride SA (K-DUR,KLOR-CON) 20 MEQ tablet, Take 1 tablet (20 mEq total) by mouth once daily., Disp: 90 tablet, Rfl: 1    pregabalin (LYRICA) 100 MG capsule, Take 1 capsule (100 mg total) by mouth 3 (three) times daily., Disp: 90 capsule, Rfl: 1    propylene glycol/peg 400/PF (SYSTANE, PF, OPHT), Apply to eye., Disp: , Rfl:     tamsulosin (FLOMAX) 0.4 mg Cap, Take 1 capsule (0.4 mg total) by mouth once daily., Disp: 90 capsule,  "Rfl: 3  No current facility-administered medications for this visit.    Facility-Administered Medications Ordered in Other Visits:     lactated ringers infusion, , Intravenous, Continuous, Deanna Gaona MD, Last Rate: 10 mL/hr at 10/30/23 0808, New Bag at 10/30/23 0808    LIDOcaine (PF) 10 mg/ml (1%) injection 10 mg, 1 mL, Intradermal, Once, Marina De La Torre, FNP     ROS:                                                                                                                                                                             General: Denies fever, no night sweats, chills, fatigue, changes in weight.  Skin: Denies rashes, bruises, petechia  HEENT: Denies headache, head injury, no acute vision changes.  CVS: Denies chest pain, palpitations, orthopnea, PND, + edema  Resp: Denies SOB, cough, wheezing  GI: Denies dysphagia, melena, hematochezia, abdominal pain, nausea, vomiting, constipation.  : Denies dysuria, hematuria, polyuria, CVA pain, nocturia  Musculoskeletal: Denies joint stiffness, pain, swelling or weakness  Neuro: Denies headaches, syncope, seizures, numbness, tingling, vertigo, dizziness      Vitals:  Blood pressure 111/70, pulse 74, temperature 98.2 °F (36.8 °C), temperature source Oral, resp. rate 18, height 5' 9" (1.753 m), weight 106.1 kg (234 lb), SpO2 100%.       PE:  Physical Exam  Constitutional:       Appearance: Normal appearance. He is obese.   HENT:      Head: Normocephalic and atraumatic.      Nose: Nose normal.      Mouth/Throat:      Mouth: Mucous membranes are moist.      Pharynx: Oropharynx is clear.   Eyes:      Extraocular Movements: Extraocular movements intact.      Conjunctiva/sclera: Conjunctivae normal.      Pupils: Pupils are equal, round, and reactive to light.   Cardiovascular:      Rate and Rhythm: Normal rate and regular rhythm.      Pulses: Normal pulses.      Heart sounds: Normal heart sounds. No murmur heard.     No friction rub. No gallop. "   Pulmonary:      Effort: Pulmonary effort is normal.      Breath sounds: Normal breath sounds.   Abdominal:      General: Abdomen is flat. Bowel sounds are normal.      Palpations: Abdomen is soft.   Musculoskeletal:         General: No swelling. Normal range of motion.      Cervical back: Normal range of motion and neck supple.   Skin:     General: Skin is warm and dry.      Capillary Refill: Capillary refill takes less than 2 seconds.   Neurological:      General: No focal deficit present.      Mental Status: He is alert and oriented to person, place, and time. Mental status is at baseline.          ASSESSMENT/PLAN:    Abnormal stress test  HTN   - Today at goal at 111/70, on amlodipine 10mg daily  - Nuclear stress test 9/3/24 showed moderate sized moderate reversible perfusion defect in LAD region.   -  LDL was 105, 11 months ago, not at goal.  - Plan to do a Echo for structural abnormalities and LHC for CAD        Mine Chan MD  Internal Medicine - PGY-1

## 2024-10-04 ENCOUNTER — HOSPITAL ENCOUNTER (OUTPATIENT)
Dept: CARDIOLOGY | Facility: HOSPITAL | Age: 64
Discharge: HOME | End: 2024-10-04
Payer: MEDICAID

## 2024-10-04 DIAGNOSIS — I10 HYPERTENSION, UNSPECIFIED TYPE: ICD-10-CM

## 2024-10-04 DIAGNOSIS — R94.39 ABNORMAL STRESS TEST: ICD-10-CM

## 2024-10-04 LAB
APICAL FOUR CHAMBER EJECTION FRACTION: 61 %
APICAL TWO CHAMBER EJECTION FRACTION: 58 %
AV INDEX (PROSTH): 0.62
AV MEAN GRADIENT: 5 MMHG
AV PEAK GRADIENT: 9 MMHG
AV VELOCITY RATIO: 0.67
CV ECHO LV RWT: 0.5 CM
DOP CALC AO PEAK VEL: 1.5 M/S
DOP CALC AO VTI: 30.5 CM
DOP CALC LVOT PEAK VEL: 1 M/S
DOP CALCLVOT PEAK VEL VTI: 18.8 CM
E WAVE DECELERATION TIME: 211 MSEC
E/A RATIO: 1.18
E/E' RATIO: 8.7 M/S
ECHO LV POSTERIOR WALL: 1.3 CM (ref 0.6–1.1)
FRACTIONAL SHORTENING: 40.4 % (ref 28–44)
INTERVENTRICULAR SEPTUM: 1.2 CM (ref 0.6–1.1)
LEFT ATRIUM AREA SYSTOLIC (APICAL 2 CHAMBER): 21.3 CM2
LEFT ATRIUM AREA SYSTOLIC (APICAL 4 CHAMBER): 17.3 CM2
LEFT ATRIUM SIZE: 4.2 CM
LEFT ATRIUM VOLUME MOD: 51.2 ML
LEFT INTERNAL DIMENSION IN SYSTOLE: 3.1 CM (ref 2.1–4)
LEFT VENTRICLE DIASTOLIC VOLUME: 128 ML
LEFT VENTRICLE END DIASTOLIC VOLUME APICAL 2 CHAMBER: 75.2 ML
LEFT VENTRICLE END DIASTOLIC VOLUME APICAL 4 CHAMBER: 114 ML
LEFT VENTRICLE END SYSTOLIC VOLUME APICAL 2 CHAMBER: 61.8 ML
LEFT VENTRICLE END SYSTOLIC VOLUME APICAL 4 CHAMBER: 41.2 ML
LEFT VENTRICLE SYSTOLIC VOLUME: 37.6 ML
LEFT VENTRICULAR INTERNAL DIMENSION IN DIASTOLE: 5.2 CM (ref 3.5–6)
LEFT VENTRICULAR MASS: 263.4 G
LV LATERAL E/E' RATIO: 7.25 M/S
LV SEPTAL E/E' RATIO: 10.88 M/S
LVED V (TEICH): 128 ML
LVES V (TEICH): 37.6 ML
LVOT MG: 2 MMHG
LVOT MV: 0.62 CM/S
MV PEAK A VEL: 0.74 M/S
MV PEAK E VEL: 0.87 M/S
OHS LV EJECTION FRACTION SIMPSONS BIPLANE MOD: 61 %
PV PEAK GRADIENT: 5 MMHG
PV PEAK VELOCITY: 1.09 M/S
TDI LATERAL: 0.12 M/S
TDI SEPTAL: 0.08 M/S
TDI: 0.1 M/S
TRICUSPID ANNULAR PLANE SYSTOLIC EXCURSION: 2.13 CM

## 2024-10-04 PROCEDURE — 93306 TTE W/DOPPLER COMPLETE: CPT

## 2024-10-04 PROCEDURE — 93306 TTE W/DOPPLER COMPLETE: CPT | Mod: 26,,, | Performed by: INTERNAL MEDICINE

## 2024-10-08 ENCOUNTER — CLINICAL SUPPORT (OUTPATIENT)
Dept: CARDIOLOGY | Facility: CLINIC | Age: 64
End: 2024-10-08
Payer: MEDICAID

## 2024-10-08 ENCOUNTER — LAB VISIT (OUTPATIENT)
Dept: LAB | Facility: HOSPITAL | Age: 64
End: 2024-10-08
Attending: INTERNAL MEDICINE
Payer: MEDICAID

## 2024-10-08 VITALS
WEIGHT: 235 LBS | OXYGEN SATURATION: 97 % | HEART RATE: 60 BPM | SYSTOLIC BLOOD PRESSURE: 119 MMHG | RESPIRATION RATE: 20 BRPM | BODY MASS INDEX: 34.8 KG/M2 | HEIGHT: 69 IN | DIASTOLIC BLOOD PRESSURE: 74 MMHG

## 2024-10-08 DIAGNOSIS — R07.9 CHEST PAIN, UNSPECIFIED TYPE: ICD-10-CM

## 2024-10-08 DIAGNOSIS — R94.39 ABNORMAL STRESS TEST: ICD-10-CM

## 2024-10-08 DIAGNOSIS — R94.39 ABNORMAL STRESS TEST: Primary | ICD-10-CM

## 2024-10-08 LAB
ANION GAP SERPL CALC-SCNC: 8 MEQ/L
APTT PPP: 27.4 SECONDS (ref 23.2–33.7)
BUN SERPL-MCNC: 8.4 MG/DL (ref 8.4–25.7)
CALCIUM SERPL-MCNC: 9.6 MG/DL (ref 8.8–10)
CHLORIDE SERPL-SCNC: 108 MMOL/L (ref 98–107)
CO2 SERPL-SCNC: 23 MMOL/L (ref 23–31)
CREAT SERPL-MCNC: 0.9 MG/DL (ref 0.73–1.18)
CREAT/UREA NIT SERPL: 9
ERYTHROCYTE [DISTWIDTH] IN BLOOD BY AUTOMATED COUNT: 12.8 % (ref 11.5–17)
GFR SERPLBLD CREATININE-BSD FMLA CKD-EPI: >60 ML/MIN/1.73/M2
GLUCOSE SERPL-MCNC: 107 MG/DL (ref 82–115)
HCT VFR BLD AUTO: 44.2 % (ref 42–52)
HGB BLD-MCNC: 14.6 G/DL (ref 14–18)
INR PPP: 1
MCH RBC QN AUTO: 28.3 PG (ref 27–31)
MCHC RBC AUTO-ENTMCNC: 33 G/DL (ref 33–36)
MCV RBC AUTO: 85.7 FL (ref 80–94)
NRBC BLD AUTO-RTO: 0 %
OHS QRS DURATION: 82 MS
OHS QTC CALCULATION: 420 MS
PLATELET # BLD AUTO: 142 X10(3)/MCL (ref 130–400)
PMV BLD AUTO: 11.3 FL (ref 7.4–10.4)
POTASSIUM SERPL-SCNC: 4.4 MMOL/L (ref 3.5–5.1)
PROTHROMBIN TIME: 13.2 SECONDS (ref 11.4–14)
RBC # BLD AUTO: 5.16 X10(6)/MCL (ref 4.7–6.1)
SODIUM SERPL-SCNC: 139 MMOL/L (ref 136–145)
WBC # BLD AUTO: 5.84 X10(3)/MCL (ref 4.5–11.5)

## 2024-10-08 PROCEDURE — 36415 COLL VENOUS BLD VENIPUNCTURE: CPT

## 2024-10-08 PROCEDURE — 85027 COMPLETE CBC AUTOMATED: CPT

## 2024-10-08 PROCEDURE — 85730 THROMBOPLASTIN TIME PARTIAL: CPT

## 2024-10-08 PROCEDURE — 80048 BASIC METABOLIC PNL TOTAL CA: CPT

## 2024-10-08 PROCEDURE — 85610 PROTHROMBIN TIME: CPT

## 2024-10-08 PROCEDURE — 99213 OFFICE O/P EST LOW 20 MIN: CPT | Mod: PBBFAC

## 2024-10-08 PROCEDURE — 93005 ELECTROCARDIOGRAM TRACING: CPT

## 2024-10-08 NOTE — PROGRESS NOTES
Here for pre-op teaching, instructions discussed, questions encouraged and answered, print outs given.

## 2024-10-16 ENCOUNTER — HOSPITAL ENCOUNTER (OUTPATIENT)
Facility: HOSPITAL | Age: 64
Discharge: HOME | End: 2024-10-16
Attending: INTERNAL MEDICINE | Admitting: INTERNAL MEDICINE
Payer: MEDICAID

## 2024-10-16 VITALS
WEIGHT: 231 LBS | BODY MASS INDEX: 34.21 KG/M2 | OXYGEN SATURATION: 99 % | RESPIRATION RATE: 20 BRPM | TEMPERATURE: 98 F | HEART RATE: 66 BPM | HEIGHT: 69 IN | DIASTOLIC BLOOD PRESSURE: 74 MMHG | SYSTOLIC BLOOD PRESSURE: 128 MMHG

## 2024-10-16 DIAGNOSIS — R07.9 CHEST PAIN: ICD-10-CM

## 2024-10-16 DIAGNOSIS — R94.39 ABNORMAL STRESS TEST: ICD-10-CM

## 2024-10-16 PROCEDURE — 99152 MOD SED SAME PHYS/QHP 5/>YRS: CPT | Performed by: INTERNAL MEDICINE

## 2024-10-16 PROCEDURE — 25000003 PHARM REV CODE 250: Performed by: INTERNAL MEDICINE

## 2024-10-16 PROCEDURE — 25500020 PHARM REV CODE 255: Performed by: INTERNAL MEDICINE

## 2024-10-16 PROCEDURE — 99153 MOD SED SAME PHYS/QHP EA: CPT | Performed by: INTERNAL MEDICINE

## 2024-10-16 PROCEDURE — 63600175 PHARM REV CODE 636 W HCPCS: Performed by: INTERNAL MEDICINE

## 2024-10-16 PROCEDURE — C1894 INTRO/SHEATH, NON-LASER: HCPCS | Performed by: INTERNAL MEDICINE

## 2024-10-16 PROCEDURE — C1887 CATHETER, GUIDING: HCPCS | Performed by: INTERNAL MEDICINE

## 2024-10-16 PROCEDURE — 93458 L HRT ARTERY/VENTRICLE ANGIO: CPT | Performed by: INTERNAL MEDICINE

## 2024-10-16 PROCEDURE — C1769 GUIDE WIRE: HCPCS | Performed by: INTERNAL MEDICINE

## 2024-10-16 RX ORDER — SODIUM CHLORIDE 9 MG/ML
INJECTION, SOLUTION INTRAVENOUS CONTINUOUS
Status: DISCONTINUED | OUTPATIENT
Start: 2024-10-16 | End: 2024-10-16 | Stop reason: HOSPADM

## 2024-10-16 RX ORDER — SODIUM CHLORIDE 9 MG/ML
INJECTION, SOLUTION INTRAVENOUS ONCE
Status: COMPLETED | OUTPATIENT
Start: 2024-10-16 | End: 2024-10-16

## 2024-10-16 RX ORDER — FENTANYL CITRATE 50 UG/ML
INJECTION, SOLUTION INTRAMUSCULAR; INTRAVENOUS
Status: DISCONTINUED | OUTPATIENT
Start: 2024-10-16 | End: 2024-10-16 | Stop reason: HOSPADM

## 2024-10-16 RX ORDER — MORPHINE SULFATE 2 MG/ML
2 INJECTION, SOLUTION INTRAMUSCULAR; INTRAVENOUS EVERY 4 HOURS PRN
Status: DISCONTINUED | OUTPATIENT
Start: 2024-10-16 | End: 2024-10-16 | Stop reason: HOSPADM

## 2024-10-16 RX ORDER — MIDAZOLAM HYDROCHLORIDE 1 MG/ML
INJECTION INTRAMUSCULAR; INTRAVENOUS
Status: DISCONTINUED | OUTPATIENT
Start: 2024-10-16 | End: 2024-10-16 | Stop reason: HOSPADM

## 2024-10-16 RX ORDER — ACETAMINOPHEN 325 MG/1
650 TABLET ORAL EVERY 4 HOURS PRN
Status: DISCONTINUED | OUTPATIENT
Start: 2024-10-16 | End: 2024-10-16 | Stop reason: HOSPADM

## 2024-10-16 RX ORDER — DIAZEPAM 5 MG/1
5 TABLET ORAL ONCE
Status: COMPLETED | OUTPATIENT
Start: 2024-10-16 | End: 2024-10-16

## 2024-10-16 RX ORDER — NITROGLYCERIN 20 MG/100ML
INJECTION INTRAVENOUS
Status: DISCONTINUED | OUTPATIENT
Start: 2024-10-16 | End: 2024-10-16 | Stop reason: HOSPADM

## 2024-10-16 RX ORDER — HYDRALAZINE HYDROCHLORIDE 20 MG/ML
10 INJECTION INTRAMUSCULAR; INTRAVENOUS EVERY 4 HOURS PRN
Status: DISCONTINUED | OUTPATIENT
Start: 2024-10-16 | End: 2024-10-16 | Stop reason: HOSPADM

## 2024-10-16 RX ORDER — ONDANSETRON HYDROCHLORIDE 2 MG/ML
4 INJECTION, SOLUTION INTRAVENOUS EVERY 8 HOURS PRN
Status: DISCONTINUED | OUTPATIENT
Start: 2024-10-16 | End: 2024-10-16 | Stop reason: HOSPADM

## 2024-10-16 RX ORDER — DIPHENHYDRAMINE HCL 25 MG
25 CAPSULE ORAL ONCE
Status: COMPLETED | OUTPATIENT
Start: 2024-10-16 | End: 2024-10-16

## 2024-10-16 RX ORDER — LIDOCAINE HYDROCHLORIDE 10 MG/ML
INJECTION, SOLUTION INFILTRATION; PERINEURAL
Status: DISCONTINUED | OUTPATIENT
Start: 2024-10-16 | End: 2024-10-16 | Stop reason: HOSPADM

## 2024-10-16 RX ORDER — HEPARIN SODIUM 5000 [USP'U]/ML
INJECTION, SOLUTION INTRAVENOUS; SUBCUTANEOUS
Status: DISCONTINUED | OUTPATIENT
Start: 2024-10-16 | End: 2024-10-16 | Stop reason: HOSPADM

## 2024-10-16 RX ORDER — HYDROCODONE BITARTRATE AND ACETAMINOPHEN 5; 325 MG/1; MG/1
1 TABLET ORAL EVERY 4 HOURS PRN
Status: DISCONTINUED | OUTPATIENT
Start: 2024-10-16 | End: 2024-10-16 | Stop reason: HOSPADM

## 2024-10-16 RX ADMIN — DIAZEPAM 5 MG: 5 TABLET ORAL at 06:10

## 2024-10-16 RX ADMIN — BACITRACIN ZINC, NEOMYCIN, POLYMYXIN B 1 EACH: 400; 3.5; 5 OINTMENT TOPICAL at 10:10

## 2024-10-16 RX ADMIN — DIPHENHYDRAMINE HYDROCHLORIDE 25 MG: 25 CAPSULE ORAL at 06:10

## 2024-10-16 RX ADMIN — SODIUM CHLORIDE: 9 INJECTION, SOLUTION INTRAVENOUS at 06:10

## 2024-10-16 NOTE — Clinical Note
41 ml of contrast were injected throughout the case. 10 mL of contrast was the total wasted during the case. 51 mL was the total amount used during the case.

## 2024-10-16 NOTE — DISCHARGE SUMMARY
[facility] - Cath Lab  Discharge Note  Short Stay    Procedure(s) (LRB):  Angiogram, Coronary, with Left Heart Cath (N/A)    Final Diagnosis:  successful outpatient coronary angiogram.   Outcome:  the patient tolerated the procedure well, and can be discharged.    Disposition:  discharge to home.   Follow-up:  in Cardiology Clinic in 1 month.  If the patient has any concerning symptoms, the patient should go to the Emergency Department.        Chaitanya Cramer MD

## 2024-10-16 NOTE — Clinical Note
The catheter was repositioned into the ostium   right coronary artery. An angiography was performed of the right coronary arteries. The angiography was performed via power injection.

## 2024-10-16 NOTE — Clinical Note
The radial band was applied to the right radial artery. 9 cc's of air were inserted into the closure device. Fingers warm to touch. Cap refill < 3. Able to move fingers without difficulty or complaints

## 2024-10-16 NOTE — INTERVAL H&P NOTE
Arcadio Scott was evaluated in Cardiology Clinic and scheduled for [facility].  The patient is stable to proceed with the procedure.  No significant change in the patient's status noted.      Chaitanya Cramer MD

## 2024-10-17 ENCOUNTER — OFFICE VISIT (OUTPATIENT)
Dept: NEUROLOGY | Facility: CLINIC | Age: 64
End: 2024-10-17
Payer: MEDICAID

## 2024-10-17 VITALS
DIASTOLIC BLOOD PRESSURE: 72 MMHG | SYSTOLIC BLOOD PRESSURE: 129 MMHG | TEMPERATURE: 98 F | HEART RATE: 71 BPM | BODY MASS INDEX: 34.71 KG/M2 | HEIGHT: 69 IN | WEIGHT: 234.38 LBS | OXYGEN SATURATION: 99 % | RESPIRATION RATE: 18 BRPM

## 2024-10-17 DIAGNOSIS — F81.0 READING IMPAIRMENT: ICD-10-CM
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
## 2024-10-17 DIAGNOSIS — G60.8 PERIPHERAL SENSORY-MOTOR AXONAL POLYNEUROPATHY: Primary | ICD-10-CM

## 2024-10-17 PROCEDURE — 1159F MED LIST DOCD IN RCRD: CPT | Mod: CPTII,,, | Performed by: NURSE PRACTITIONER

## 2024-10-17 PROCEDURE — 3078F DIAST BP <80 MM HG: CPT | Mod: CPTII,,, | Performed by: NURSE PRACTITIONER

## 2024-10-17 PROCEDURE — 3008F BODY MASS INDEX DOCD: CPT | Mod: CPTII,,, | Performed by: NURSE PRACTITIONER

## 2024-10-17 PROCEDURE — 3074F SYST BP LT 130 MM HG: CPT | Mod: CPTII,,, | Performed by: NURSE PRACTITIONER

## 2024-10-17 PROCEDURE — 99214 OFFICE O/P EST MOD 30 MIN: CPT | Mod: S$PBB,,, | Performed by: NURSE PRACTITIONER

## 2024-10-17 PROCEDURE — 99214 OFFICE O/P EST MOD 30 MIN: CPT | Mod: PBBFAC | Performed by: NURSE PRACTITIONER

## 2024-10-17 PROCEDURE — 1160F RVW MEDS BY RX/DR IN RCRD: CPT | Mod: CPTII,,, | Performed by: NURSE PRACTITIONER

## 2024-10-17 RX ORDER — PREGABALIN 100 MG/1
100 CAPSULE ORAL 2 TIMES DAILY
Qty: 60 CAPSULE | Refills: 3 | Status: SHIPPED | OUTPATIENT
Start: 2024-10-17 | End: 2025-10-17

## 2024-10-17 NOTE — PROGRESS NOTES
[facility] Neurology Follow Up Office Visit Note    Initial Visit Date: 1/10/2023  Last Visit Date: 8/29/2024  Current Visit Date:  10/17/2024    Chief Complaint:     Chief Complaint   Patient presents with    Follow-up    states neuropathy is getting a little better     History of Present Illness:      This is 63 y.o. male with history of chronic lower back pain, alcohol dependence who was referred for bilateral lower extremity numbness for 1 + year.  During that visit, Lyrica was increased to 100mg PO TID. Pt was referred to pain managment    Interim Hx:  Today, Pt states neuropathy controlled w/Lyrica 100 mg Qday-BID. Also improved w/elevating legs. Worsens with activity. Interferes w/ADLs. Drinks <1-2 beers weekly. Decreased cigar smoking to 1-2/week. Not employed at this time. Continues w/R lower back pain that he has had for years, but more consistent for several few months. Pain scale 9/10, described as aching. Interferes w/ADLs. Worse with prolonged sitting, improves w/standing. Has been evaluated by Dr. Elaine, does not wish to have surgery. Has appt with pain management. Admits to one fall while ambulating in house, not sure of circumstances, denies injury.    Presenting Hx:  Patient states that he had bilateral LBP radiating down bilateral LE, L>R. He reports decreased sensation in bilateral feet, left worse than right. Denied any trip and falls. Reports bilateral knee pain, left > right. Drinks alcohol throughout the week, drinking around a case of beer every 2 weeks. Quit smoking in 11/2019. Denied any saddle anesthesia. Denied any urinary incontinence. 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   Medications:     Current Outpatient Medications on File Prior to Visit   Medication Sig Dispense Refill    amLODIPine (NORVASC) 10 MG tablet Take 1 tablet (10 mg total) by mouth once daily. 90 tablet 3    mirabegron (MYRBETRIQ) 25 mg Tb24 ER tablet Take 25 mg by mouth once daily.      moxifloxacin (VIGAMOX)  0.5 % ophthalmic solution 1 drop 3 (three) times daily.      ofloxacin (OCUFLOX) 0.3 % ophthalmic solution Place 1 drop into the left eye 4 (four) times daily.      pantoprazole (PROTONIX) 40 MG tablet Take 1 tablet (40 mg total) by mouth once daily. 30 tablet 11    potassium chloride SA (K-DUR,KLOR-CON) 20 MEQ tablet Take 1 tablet (20 mEq total) by mouth once daily. 90 tablet 1    tamsulosin (FLOMAX) 0.4 mg Cap Take 1 capsule (0.4 mg total) by mouth once daily. 90 capsule 3    [DISCONTINUED] pregabalin (LYRICA) 100 MG capsule Take 1 capsule (100 mg total) by mouth 3 (three) times daily. 90 capsule 1    [DISCONTINUED] hydrocortisone 1 % cream Apply topically 2 (two) times daily. (Patient not taking: Reported on 10/17/2024) 30 g 0    [DISCONTINUED] ketorolac 0.5% (ACULAR) 0.5 % Drop Place 1 drop into the right eye 4 (four) times daily. (Patient not taking: Reported on 10/17/2024)      [DISCONTINUED] polyethylene glycol (MOVIPREP) 100-7.5-2.691 gram solution Take as directed prior to colonoscopy (Patient not taking: Reported on 10/17/2024) 1 kit 0    [DISCONTINUED] prednisoLONE acetate (PRED FORTE) 1 % DrpS Place 1 drop into the left eye 4 (four) times daily. (Patient not taking: Reported on 10/17/2024)      [DISCONTINUED] propylene glycol/peg 400/PF (SYSTANE, PF, OPHT) Apply to eye. (Patient not taking: Reported on 10/17/2024)       Current Facility-Administered Medications on File Prior to Visit   Medication Dose Route Frequency Provider Last Rate Last Admin    lactated ringers infusion   Intravenous Continuous Deanna Gaona MD 10 mL/hr at 10/30/23 0808 New Bag at 10/30/23 0808    LIDOcaine (PF) 10 mg/ml (1%) injection 10 mg  1 mL Intradermal Once Marina De La Torre FNP        [COMPLETED] neomycin-bacitracnZn-polymyxnB packet   Topical (Top) Once Chaitanya Cramer MD   1 each at 10/16/24 1049    sodium chloride 0.9% flush 10 mL  10 mL Intravenous PRN Chaitanya Cramer MD        [DISCONTINUED] 0.9%  NaCl  infusion   Intravenous Continuous Chaitanya Cramer MD        [DISCONTINUED] acetaminophen tablet 650 mg  650 mg Oral Q4H PRN Chaitanya Cramer MD        [DISCONTINUED] fentaNYL injection    PRN Chaitanya Cramer MD   25 mcg at 10/16/24 0816    [DISCONTINUED] heparin (porcine) injection    PRN Chaitanya Cramer MD   5,000 Units at 10/16/24 0832    [DISCONTINUED] hydrALAZINE injection 10 mg  10 mg Intravenous Q4H PRN Chaitanya Cramer MD        [DISCONTINUED] HYDROcodone-acetaminophen 5-325 mg per tablet 1 tablet  1 tablet Oral Q4H PRN Chaitanya Cramer MD        [DISCONTINUED] iohexoL (OMNIPAQUE 350) injection    PRN Chaitanya Cramer MD   41 mL at 10/16/24 0842    [DISCONTINUED] LIDOcaine HCL 10 mg/ml (1%) injection    PRN Chaitanya Cramer MD   2 mL at 10/16/24 0824    [DISCONTINUED] midazolam (VERSED) 1 mg/mL injection    PRN Chaitanya Cramer MD   1 mg at 10/16/24 0816    [DISCONTINUED] morphine injection 2 mg  2 mg Intravenous Q4H PRN Chaitanya Cramer MD        [DISCONTINUED] neomycin-bacitracnZn-polymyxnB 3.5-400-5,000 mg-unit-unit packet             [DISCONTINUED] nitroGLYCERIN in 5 % dextrose 50 mg/250 mL (200 mcg/mL) infusion    Continuous PRN Chaitanya Cramer MD   100 mcg at 10/16/24 0838    [DISCONTINUED] ondansetron injection 4 mg  4 mg Intravenous Q8H PRN Chaitanya Cramer MD           Labs:     Results for orders placed or performed in visit on 10/08/24   CBC Without Differential    Collection Time: 10/08/24  9:42 AM   Result Value Ref Range    WBC 5.84 4.50 - 11.50 x10(3)/mcL    RBC 5.16 4.70 - 6.10 x10(6)/mcL    Hgb 14.6 14.0 - 18.0 g/dL    Hct 44.2 42.0 - 52.0 %    MCV 85.7 80.0 - 94.0 fL    MCH 28.3 27.0 - 31.0 pg    MCHC 33.0 33.0 - 36.0 g/dL    RDW 12.8 11.5 - 17.0 %    Platelet 142 130 - 400 x10(3)/mcL    MPV 11.3 (H) 7.4 - 10.4 fL    NRBC% 0.0 %   Basic Metabolic Panel    Collection Time: 10/08/24  9:42 AM   Result Value Ref Range    Sodium 139 136 - 145 mmol/L    Potassium 4.4  3.5 - 5.1 mmol/L    Chloride 108 (H) 98 - 107 mmol/L    CO2 23 23 - 31 mmol/L    Glucose 107 82 - 115 mg/dL    Blood Urea Nitrogen 8.4 8.4 - 25.7 mg/dL    Creatinine 0.90 0.73 - 1.18 mg/dL    BUN/Creatinine Ratio 9     Calcium 9.6 8.8 - 10.0 mg/dL    Anion Gap 8.0 mEq/L    eGFR >60 mL/min/1.73/m2   Protime-INR    Collection Time: 10/08/24  9:42 AM   Result Value Ref Range    PT 13.2 11.4 - 14.0 seconds    INR 1.0 <=1.3   APTT    Collection Time: 10/08/24  9:42 AM   Result Value Ref Range    PTT 27.4 23.2 - 33.7 seconds     Studies:      - MRI C-spine 2/29/2024: mild degenerative barrowing of spinal canal at C3-5 and C6-7; multilevel neural foraminal stenosis noted    - MRI T-spine 2/29/2024: no definite convincing cord signal abnormality; no significant spinal canal or neural foraminal stenosis    - MRI L-spine w/out Kyree 8/28/2023 - multilevel DDD w/facet degenerative changes w/multilevel canal and foraminal stenosis    - CT L-spine w/out Kyree 7/14/2023 - multilevel degenerative changes    - NCS/EMG bilateral LE 8/12/2022 by Dr. Marck London:  I have reviewed the study independently and with the patient. Incomplete study.  Also, no reference range.  No conduction velocity.  Latency and amplitude of SNAP and CMAP mostly symmetric without reference range.  No F-waves.     Review of Systems:     Review of Systems   All other systems reviewed and are negative.  As per HPI    Physical Exams:     Vitals:    10/17/24 0908   BP: 129/72   Pulse: 71   Resp: 18   Temp: 98 °F (36.7 °C)     Physical Exam  Vitals and nursing note reviewed.   Constitutional:       Appearance: Normal appearance.   HENT:      Head: Normocephalic and atraumatic.      Right Ear: External ear normal.      Left Ear: External ear normal.      Nose: Nose normal.      Mouth/Throat:      Mouth: Mucous membranes are moist.      Pharynx: Oropharynx is clear.   Eyes:      Conjunctiva/sclera: Conjunctivae normal.   Cardiovascular:      Rate and Rhythm: Normal rate  and regular rhythm.      Pulses: Normal pulses.   Pulmonary:      Effort: Pulmonary effort is normal.   Abdominal:      General: There is no distension.      Tenderness: There is no guarding.   Musculoskeletal:         General: Normal range of motion.      Cervical back: Normal range of motion.      Comments: Painful lower back   Skin:     General: Skin is warm and dry.      Coloration: Skin is not jaundiced.      Findings: No lesion or rash.   Neurological:      Mental Status: He is alert.   Psychiatric:         Mood and Affect: Mood normal.     Comprehensive Neurological Exam:  Mental Status: Alert Oriented to Self, Date, and Place. Comprehension wnl. No dysarthria.   CN II - XII: DUARTE, No APD, VFFC, No ptosis OU, EOMI without nystagmus, LT/Temp symmetric in CN V1-3 distribution, Hearing grossly intact, Face Symmetric, Tongue and Uvula midline, Trapezius symmetric bilateral.   Motor: tone and bulk wnl throughout, no abnormal involuntary or voluntary movements, 5/5 to confrontation, Fine finger movements wnl b/l, No pronator drift.   Sensory: LT, Proprioception, Vibration, PP, Temp symmetric subjectively decreased in bilateral LE throughout.   Reflexes: 2+ throughout except for 1+ in bilateral AJ, plantar reflexes downward bilateral   Cerebellar: FNF wnl b/l, RAHM wnl b/l.  Romberg: Negative; sway to L  Gait: antalgic gait, stooped posture, utilizing cane    Assessment:     This is 63 y.o. male with history of chronic lower back pain, alcohol dependence who was referred for bilateral lower extremity numbness. NCS/EMG results were inconclusive due to missing components based on report. Exam and history more consistent with lumbar radiculopathy. Neuropathy improved on Lyrica 100mg PO BID. Evaluated by Dr. Elaine, does not wish to proceed w/surgery. Has appt with pain management. Admits to one fall while ambulating in house, denies injury.    Problem List Items Addressed This Visit          Neuro    Peripheral  sensory-motor axonal polyneuropathy - Primary    Relevant Medications    pregabalin (LYRICA) 100 MG capsule    Reading impairment       Plan:     [] c/w Lyrica to 100mg PO BID  [] handout on back stretches  [] keep appt w/pain management in [facility] 6 Months     I have explained the treatment plan, diagnosis, and prognosis to patient. All questions are answered to the best of my knowledge.     Face to face time 30 minutes, including documentation, chart review, counseling, education, review of test results, relevant medical records, and coordination of care.     Felicita Ramirez, NP-C  General Neurology  10/17/2024

## 2024-10-29 ENCOUNTER — ANESTHESIA EVENT (OUTPATIENT)
Dept: ENDOSCOPY | Facility: HOSPITAL | Age: 64
End: 2024-10-29
Payer: MEDICAID

## 2024-10-30 ENCOUNTER — ANESTHESIA (OUTPATIENT)
Dept: ENDOSCOPY | Facility: HOSPITAL | Age: 64
End: 2024-10-30
Payer: MEDICAID

## 2024-10-30 ENCOUNTER — HOSPITAL ENCOUNTER (OUTPATIENT)
Facility: HOSPITAL | Age: 64
Discharge: HOME | End: 2024-10-30
Attending: INTERNAL MEDICINE | Admitting: INTERNAL MEDICINE
Payer: MEDICAID

## 2024-10-30 DIAGNOSIS — Z86.0100 HX OF COLONIC POLYPS: ICD-10-CM

## 2024-10-30 PROCEDURE — 37000009 HC ANESTHESIA EA ADD 15 MINS: Performed by: INTERNAL MEDICINE

## 2024-10-30 PROCEDURE — 37000008 HC ANESTHESIA 1ST 15 MINUTES: Performed by: INTERNAL MEDICINE

## 2024-10-30 PROCEDURE — 88305 TISSUE EXAM BY PATHOLOGIST: CPT | Mod: TC,91 | Performed by: INTERNAL MEDICINE

## 2024-10-30 PROCEDURE — 27201423 OPTIME MED/SURG SUP & DEVICES STERILE SUPPLY: Performed by: INTERNAL MEDICINE

## 2024-10-30 PROCEDURE — 63600175 PHARM REV CODE 636 W HCPCS: Performed by: NURSE ANESTHETIST, CERTIFIED REGISTERED

## 2024-10-30 PROCEDURE — 45385 COLONOSCOPY W/LESION REMOVAL: CPT | Performed by: INTERNAL MEDICINE

## 2024-10-30 RX ORDER — LIDOCAINE HYDROCHLORIDE 20 MG/ML
INJECTION, SOLUTION EPIDURAL; INFILTRATION; INTRACAUDAL; PERINEURAL
Status: DISCONTINUED | OUTPATIENT
Start: 2024-10-30 | End: 2024-10-30

## 2024-10-30 RX ORDER — SODIUM CHLORIDE, SODIUM LACTATE, POTASSIUM CHLORIDE, CALCIUM CHLORIDE 600; 310; 30; 20 MG/100ML; MG/100ML; MG/100ML; MG/100ML
INJECTION, SOLUTION INTRAVENOUS CONTINUOUS
Status: DISCONTINUED | OUTPATIENT
Start: 2024-10-30 | End: 2024-10-30 | Stop reason: HOSPADM

## 2024-10-30 RX ORDER — PROPOFOL 10 MG/ML
VIAL (ML) INTRAVENOUS
Status: DISCONTINUED | OUTPATIENT
Start: 2024-10-30 | End: 2024-10-30

## 2024-10-30 RX ORDER — LIDOCAINE HYDROCHLORIDE 10 MG/ML
1 INJECTION, SOLUTION EPIDURAL; INFILTRATION; INTRACAUDAL; PERINEURAL ONCE
Status: DISCONTINUED | OUTPATIENT
Start: 2024-10-30 | End: 2024-10-30 | Stop reason: HOSPADM

## 2024-10-30 RX ADMIN — LIDOCAINE HYDROCHLORIDE 50 MG: 20 INJECTION, SOLUTION EPIDURAL; INFILTRATION; INTRACAUDAL; PERINEURAL at 09:10

## 2024-10-30 RX ADMIN — PROPOFOL 80 MG: 10 INJECTION, EMULSION INTRAVENOUS at 09:10

## 2024-10-30 RX ADMIN — PROPOFOL 120 MCG/KG/MIN: 10 INJECTION, EMULSION INTRAVENOUS at 10:10

## 2024-10-30 NOTE — H&P
Colonoscopy History and Physical    Patient Name: Arcadio Scott  MRN: [id number]  : 1960  Date of Procedure:  10/30/2024  Referring Physician: Nicolás Perez FNP  Primary Physician: Nicolás Perez FNP  Procedure Physician: Ngoc Fenton MD, MPH     Procedure - Colonoscopy  ASA - per anesthesia  Mallampati - per anesthesia  History of Anesthesia problems - no  Family history Anesthesia problems -  no   Plan of anesthesia - General    Diagnosis: previous adenomatous polyp  Chief Complaint: Same as above    HPI: Patient is an 63 y.o. male is here for the above.     Mr. Scott is a 63 year old AAM with GERD, history of adenomatous colon polyps here for a surveillance colonoscopy.     He was seen for intermittent nausea with subsequent vomiting. He was placed on PPI.  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  He has not had a stool antigen to confirm eradication.  His symptoms have essentially resolved.  He remains on pantoprazole and notices that if he misses the medication, his nausea does return.  No recent issues with emesis.     He has regular stools daily without any constipation, diarrhea, rectal bleeding or melena.     Colonoscopy 2022 revealed seven 3-10 mm adenomatous polyps in the sigmoid colon, descending colon, proximal descending colon and hepatic flexure removed with cold snare, resected and retrieved.  Repeat colonoscopy was recommended in 1 year.       Colonoscopy 2023: 25 (twenty-five) subcentimeter tubular adenomatous removed.  Repeat in 1 year was recommended.     He denies ever having an EGD done. He denies regular NSAID use or use of blood thinners. He denies tobacco use and previously smoked cigars. He drinks a few beers a week. He denies a family history of IBD, colon polyps, or colon cancer. His brother has a history of stomach cancer.    Last colonoscopy:   Family history: as above  Anticoagulation:  none    ROS:  Constitutional: No fevers, chills, No weight loss  CV: No chest pain  Pulm: No cough, No shortness of breath  GI: see HPI    Medical History:   Past Medical History:   Diagnosis Date    GERD (gastroesophageal reflux disease)     Hypertension     Neuropathy     Unspecified glaucoma          Surgical History:   Past Surgical History:   Procedure Laterality Date    ANGIOGRAM, CORONARY, WITH LEFT HEART CATHETERIZATION N/A 10/16/2024    Procedure: Angiogram, Coronary, with Left Heart Cath;  Surgeon: Chaitanya Cramer MD;  Location: [facility] CATH LAB;  Service: Cardiology;  Laterality: N/A;    CATARACT EXTRACTION W/  INTRAOCULAR LENS IMPLANT Right 8/13/2024    Procedure: EXTRACTION, CATARACT, WITH IOL INSERT- OD;  Surgeon: Duncan Arevalo MD;  Location: [facility] OR;  Service: Ophthalmology;  Laterality: Right;    CATARACT EXTRACTION W/  INTRAOCULAR LENS IMPLANT Left 9/24/2024    Procedure: EXTRACTION, CATARACT, WITH IOL INSERT- OS;  Surgeon: Duncan Arevalo MD;  Location: [facility] OR;  Service: Ophthalmology;  Laterality: Left;    COLONOSCOPY W/ POLYPECTOMY  01/28/2022    COLONOSCOPY, WITH POLYPECTOMY USING SNARE N/A 10/30/2023    Procedure: COLONOSCOPY, WITH POLYPECTOMY USING SNARE;  Surgeon: Ngoc Fenton MD;  Location: [facility] ENDOSCOPY;  Service: Gastroenterology;  Laterality: N/A;    CYST REMOVAL      Facial    EGD, WITH CLOSED BIOPSY N/A 10/30/2023    Procedure: EGD;  Surgeon: Ngoc Fenton MD;  Location: [facility] ENDOSCOPY;  Service: Gastroenterology;  Laterality: N/A;    SURGICAL REMOVAL OF LESION OF FACE Bilateral 07/18/2022    Procedure: EXCISION, LESION, FACE AND NECK;  Surgeon: Marcelo Capps MD;  Location: [facility] OR;  Service: ENT;  Laterality: Bilateral;       Family History:   Family History   Problem Relation Name Age of Onset    Diabetes Mother      Heart disease Mother      Cancer Father          Unsure of source    Aneurysm Sister      Diabetes Brother      Stomach cancer Brother       Alzheimer's disease Maternal Grandmother     .    Social History:   Social History     Socioeconomic History    Marital status: Single    Number of children: 0   Tobacco Use    Smoking status: Former     Types: Cigars, Cigarettes     Passive exposure: Current    Smokeless tobacco: Never    Tobacco comments:     Quit cigarettes in 2019, smokes cigars 1-2 times a week   Substance and Sexual Activity    Alcohol use: Not Currently     Comment: occasional    Drug use: Never    Sexual activity: Not Currently     Social Drivers of Health     Financial Resource Strain: Low Risk  (9/28/2023)    Overall Financial Resource Strain (CARDIA)     Difficulty of Paying Living Expenses: Not hard at all   Food Insecurity: No Food Insecurity (9/28/2023)    Hunger Vital Sign     Worried About Running Out of Food in the Last Year: Never true     Ran Out of Food in the Last Year: Never true   Transportation Needs: No Transportation Needs (9/28/2023)    PRAPARE - Transportation     Lack of Transportation (Medical): No     Lack of Transportation (Non-Medical): No   Physical Activity: Inactive (9/28/2023)    Exercise Vital Sign     Days of Exercise per Week: 0 days     Minutes of Exercise per Session: 0 min   Stress: No Stress Concern Present (9/28/2023)    [employer] - Occupational Stress Questionnaire     Feeling of Stress : Not at all   Housing Stability: Low Risk  (6/17/2024)    Housing Stability Vital Sign     Unable to Pay for Housing in the Last Year: No     Homeless in the Last Year: No       Review of patient's allergies indicates:  No Known Allergies    Medications:   Facility-Administered Medications Prior to Admission   Medication Dose Route Frequency Provider Last Rate Last Admin    sodium chloride 0.9% flush 10 mL  10 mL Intravenous Chaitanya Woo MD         Medications Prior to Admission   Medication Sig Dispense Refill Last Dose/Taking    amLODIPine (NORVASC) 10 MG tablet Take 1 tablet  "(10 mg total) by mouth once daily. 90 tablet 3 10/30/2024 Morning    mirabegron (MYRBETRIQ) 25 mg Tb24 ER tablet Take 25 mg by mouth once daily.   10/29/2024    moxifloxacin (VIGAMOX) 0.5 % ophthalmic solution 1 drop 3 (three) times daily.   10/29/2024    ofloxacin (OCUFLOX) 0.3 % ophthalmic solution Place 1 drop into the left eye 4 (four) times daily.   10/29/2024    pantoprazole (PROTONIX) 40 MG tablet Take 1 tablet (40 mg total) by mouth once daily. 30 tablet 11 10/30/2024 at  2:00 AM    potassium chloride SA (K-DUR,KLOR-CON) 20 MEQ tablet Take 1 tablet (20 mEq total) by mouth once daily. 90 tablet 1 10/29/2024    pregabalin (LYRICA) 100 MG capsule Take 1 capsule (100 mg total) by mouth 2 (two) times daily. 60 capsule 3 10/29/2024    tamsulosin (FLOMAX) 0.4 mg Cap Take 1 capsule (0.4 mg total) by mouth once daily. 90 capsule 3 10/29/2024         Physical Exam:    Vital Signs:   Vitals:    10/30/24 0751   BP: (!) 153/80   Pulse: 73   Resp: 18   Temp: 98.6 °F (37 °C)     BP (!) 153/80 (BP Location: Left arm, Patient Position: Lying)   Pulse 73   Temp 98.6 °F (37 °C) (Oral)   Resp 18   Ht 5' 9" (1.753 m)   SpO2 (!) 94%   BMI 34.56 kg/m²     General:          Well appearing in no acute distress  Lungs: Clear to auscultation bilaterally, respirations unlabored  Heart: Regular rate and rhythm, S1 and S2 normal, no obvious murmurs  Abdomen:         Soft, non-tender, bowel sounds normal, no masses, no organomegaly        Labs:  Lab Results   Component Value Date    WBC 5.84 10/08/2024    HGB 14.6 10/08/2024    HCT 44.2 10/08/2024    MCV 85.7 10/08/2024     10/08/2024     Lab Results   Component Value Date    INR 1.0 10/08/2024    APTT 27.4 10/08/2024     Lab Results   Component Value Date     10/08/2024    K 4.4 10/08/2024    CO2 23 10/08/2024    BUN 8.4 10/08/2024    CREATININE 0.90 10/08/2024    LABPROT 13.2 10/08/2024    ALBUMIN 3.4 06/07/2024    BILITOT 0.4 06/07/2024    ALKPHOS 55 06/07/2024    " ALT 7 06/07/2024    AST 17 06/07/2024         Assessment and Plan:    History reviewed, vital signs satisfactory, cardiopulmonary status satisfactory.  I have explained the sedation options, risks, benefits, and alternatives of this endoscopic procedure to the patient including but not limited to bleeding, inflammation, infection, perforation, and death.  All questions were answered and the patient consented to proceed with procedure as planned.   The patient is deemed an appropriate candidate for the sedation as planned.      Ngoc Fenton MD, MPH   of Clinical Medicine  Gastroenterology and Hepatology  [facility]    10/30/2024  8:28 AM

## 2024-10-30 NOTE — [employer] PATIENT INSTRUCTIONS
Discharge Summary/Instructions after an Endoscopic Procedure  Patient Name: Arcadio Scott  Patient MRN: [id number]  Patient YOB: 1960  Wednesday, October 30, 2024  Ngoc Fenton MD  Dear patient,  As a result of recent federal legislation (The Federal Cures Act), you may   receive lab or pathology results from your procedure in your [facility]   account before your physician is able to contact you. Your physician or   their representative will relay the results to you with their   recommendations at their soonest availability.  Thank you,  RESTRICTIONS:  During your procedure today, you received medications for sedation.  These   medications may affect your judgment, balance and coordination.  Therefore,   for 24 hours, you have the following restrictions:   - DO NOT drive a car, operate machinery, make legal/financial decisions,   sign important papers or drink alcohol.    ACTIVITY:  Today: no heavy lifting, straining or running due to procedural   sedation/anesthesia.  The following day: return to full activity including work.  DIET:  Eat and drink normally unless instructed otherwise.     TREATMENT FOR COMMON SIDE EFFECTS:  - Mild abdominal pain, nausea, belching, bloating or excessive gas:  rest,   eat lightly and use a heating pad.  - Sore Throat: treat with throat lozenges and/or gargle with warm salt   water.  - Because air was used during the procedure, expelling large amounts of air   from your rectum or belching is normal.  - If a bowel prep was taken, you may not have a bowel movement for 1-3 days.    This is normal.  SYMPTOMS TO WATCH FOR AND REPORT TO YOUR PHYSICIAN:  1. Abdominal pain or bloating, other than gas cramps.  2. Chest pain.  3. Back pain.  4. Signs of infection such as: chills or fever occurring within 24 hours   after the procedure.  5. Rectal bleeding, which would show as bright red, maroon, or black stools.   (A tablespoon of blood from the rectum is not serious,  especially if   hemorrhoids are present.)  6. Vomiting.  7. Weakness or dizziness.  GO DIRECTLY TO THE NEAREST EMERGENCY ROOM IF YOU HAVE ANY OF THE FOLLOWING:      Difficulty breathing              Chills and/or fever over 101 F   Persistent vomiting and/or vomiting blood   Severe abdominal pain   Severe chest pain   Black, tarry stools   Bleeding- more than one tablespoon   Any other symptom or condition that you feel may need urgent attention  Your doctor recommends these additional instructions:  If any biopsies were taken, your doctors clinic will contact you in 1 to 2   weeks with any results.  - Patient has a contact number available for emergencies.  The signs and   symptoms of potential delayed complications were discussed with the   patient.  Return to normal activities tomorrow.  Written discharge   instructions were provided to the patient.   - Discharge patient to home.   - Resume previous diet.   - Continue present medications.   - Await pathology results.   - Repeat colonoscopy in 3 years for surveillance.  For questions, problems or results please call your physician - Ngoc Fenton MD at Work:  [phone], Work:  [phone].  [facility] , EMERGENCY ROOM PHONE NUMBER: [phone]  IF A COMPLICATION OR EMERGENCY SITUATION ARISES AND YOU ARE UNABLE TO REACH   YOUR PHYSICIAN - GO DIRECTLY TO THE EMERGENCY ROOM.  Ngoc Fenton MD  10/30/2024 10:37:28 AM  This report has been verified and signed electronically.  Dear patient,  As a result of recent federal legislation (The Federal Cures Act), you may   receive lab or pathology results from your procedure in your [facility]   account before your physician is able to contact you. Your physician or   their representative will relay the results to you with their   recommendations at their soonest availability.  Thank you,  [employer]ATION

## 2024-10-31 VITALS
OXYGEN SATURATION: 96 % | TEMPERATURE: 99 F | BODY MASS INDEX: 34.56 KG/M2 | DIASTOLIC BLOOD PRESSURE: 69 MMHG | RESPIRATION RATE: 16 BRPM | HEIGHT: 69 IN | SYSTOLIC BLOOD PRESSURE: 119 MMHG | HEART RATE: 65 BPM

## 2024-11-01 DIAGNOSIS — R11.2 NAUSEA AND VOMITING, UNSPECIFIED VOMITING TYPE: ICD-10-CM

## 2024-11-01 DIAGNOSIS — K21.9 GASTROESOPHAGEAL REFLUX DISEASE, UNSPECIFIED WHETHER ESOPHAGITIS PRESENT: ICD-10-CM

## 2024-11-01 RX ORDER — PANTOPRAZOLE SODIUM 40 MG/1
40 TABLET, DELAYED RELEASE ORAL
Qty: 30 TABLET | Refills: 11 | Status: SHIPPED | OUTPATIENT
Start: 2024-11-01

## 2024-12-17 ENCOUNTER — OFFICE VISIT (OUTPATIENT)
Dept: INTERNAL MEDICINE | Facility: CLINIC | Age: 64
End: 2024-12-17
Payer: MEDICARE

## 2024-12-17 ENCOUNTER — LAB VISIT (OUTPATIENT)
Dept: LAB | Facility: HOSPITAL | Age: 64
End: 2024-12-17
Attending: NURSE PRACTITIONER
Payer: MEDICARE

## 2024-12-17 VITALS
SYSTOLIC BLOOD PRESSURE: 110 MMHG | HEIGHT: 69 IN | WEIGHT: 237.38 LBS | BODY MASS INDEX: 35.16 KG/M2 | RESPIRATION RATE: 18 BRPM | OXYGEN SATURATION: 97 % | DIASTOLIC BLOOD PRESSURE: 67 MMHG | HEART RATE: 72 BPM

## 2024-12-17 DIAGNOSIS — R39.12 BENIGN PROSTATIC HYPERPLASIA WITH WEAK URINARY STREAM: ICD-10-CM

## 2024-12-17 DIAGNOSIS — N40.1 BENIGN PROSTATIC HYPERPLASIA WITH WEAK URINARY STREAM: ICD-10-CM

## 2024-12-17 DIAGNOSIS — L72.0 EPIDERMAL CYST OF FACE: ICD-10-CM

## 2024-12-17 DIAGNOSIS — M51.369 DEGENERATION OF INTERVERTEBRAL DISC OF LUMBAR REGION, UNSPECIFIED WHETHER PAIN PRESENT: ICD-10-CM

## 2024-12-17 DIAGNOSIS — I10 PRIMARY HYPERTENSION: ICD-10-CM

## 2024-12-17 DIAGNOSIS — N40.0 BENIGN PROSTATIC HYPERPLASIA WITHOUT LOWER URINARY TRACT SYMPTOMS: ICD-10-CM

## 2024-12-17 DIAGNOSIS — N52.9 ERECTILE DYSFUNCTION, UNSPECIFIED ERECTILE DYSFUNCTION TYPE: ICD-10-CM

## 2024-12-17 DIAGNOSIS — R26.2 DIFFICULTY WALKING: ICD-10-CM

## 2024-12-17 DIAGNOSIS — E78.2 MIXED HYPERLIPIDEMIA: ICD-10-CM

## 2024-12-17 DIAGNOSIS — I10 PRIMARY HYPERTENSION: Primary | ICD-10-CM

## 2024-12-17 DIAGNOSIS — E66.01 CLASS 2 SEVERE OBESITY DUE TO EXCESS CALORIES WITH SERIOUS COMORBIDITY AND BODY MASS INDEX (BMI) OF 35.0 TO 35.9 IN ADULT: ICD-10-CM

## 2024-12-17 DIAGNOSIS — Z12.5 SCREENING FOR MALIGNANT NEOPLASM OF PROSTATE: ICD-10-CM

## 2024-12-17 DIAGNOSIS — E66.812 CLASS 2 SEVERE OBESITY DUE TO EXCESS CALORIES WITH SERIOUS COMORBIDITY AND BODY MASS INDEX (BMI) OF 35.0 TO 35.9 IN ADULT: ICD-10-CM

## 2024-12-17 PROBLEM — R22.9 MULTIPLE SKIN NODULES: Status: ACTIVE | Noted: 2024-12-17

## 2024-12-17 LAB
ALBUMIN SERPL-MCNC: 4.1 G/DL (ref 3.4–4.8)
ALBUMIN/GLOB SERPL: 1.1 RATIO (ref 1.1–2)
ALP SERPL-CCNC: 77 UNIT/L (ref 40–150)
ALT SERPL-CCNC: 18 UNIT/L (ref 0–55)
ANION GAP SERPL CALC-SCNC: 5 MEQ/L
AST SERPL-CCNC: 34 UNIT/L (ref 5–34)
BASOPHILS # BLD AUTO: 0.04 X10(3)/MCL
BASOPHILS NFR BLD AUTO: 0.7 %
BILIRUB SERPL-MCNC: 0.5 MG/DL
BUN SERPL-MCNC: 11.3 MG/DL (ref 8.4–25.7)
CALCIUM SERPL-MCNC: 9.3 MG/DL (ref 8.8–10)
CHLORIDE SERPL-SCNC: 108 MMOL/L (ref 98–107)
CHOLEST SERPL-MCNC: 225 MG/DL
CHOLEST/HDLC SERPL: 5 {RATIO} (ref 0–5)
CO2 SERPL-SCNC: 24 MMOL/L (ref 23–31)
CREAT SERPL-MCNC: 0.98 MG/DL (ref 0.72–1.25)
CREAT UR-MCNC: 241.9 MG/DL (ref 63–166)
CREAT/UREA NIT SERPL: 12
EOSINOPHIL # BLD AUTO: 0.21 X10(3)/MCL (ref 0–0.9)
EOSINOPHIL NFR BLD AUTO: 3.6 %
ERYTHROCYTE [DISTWIDTH] IN BLOOD BY AUTOMATED COUNT: 14.6 % (ref 11.5–17)
EST. AVERAGE GLUCOSE BLD GHB EST-MCNC: 102.5 MG/DL
GFR SERPLBLD CREATININE-BSD FMLA CKD-EPI: >60 ML/MIN/1.73/M2
GLOBULIN SER-MCNC: 3.8 GM/DL (ref 2.4–3.5)
GLUCOSE SERPL-MCNC: 102 MG/DL (ref 82–115)
HBA1C MFR BLD: 5.2 %
HCT VFR BLD AUTO: 43 % (ref 42–52)
HDLC SERPL-MCNC: 49 MG/DL (ref 35–60)
HGB BLD-MCNC: 14.5 G/DL (ref 14–18)
IMM GRANULOCYTES # BLD AUTO: 0.04 X10(3)/MCL (ref 0–0.04)
IMM GRANULOCYTES NFR BLD AUTO: 0.7 %
LDLC SERPL CALC-MCNC: 143 MG/DL (ref 50–140)
LYMPHOCYTES # BLD AUTO: 2.05 X10(3)/MCL (ref 0.6–4.6)
LYMPHOCYTES NFR BLD AUTO: 34.8 %
MCH RBC QN AUTO: 29.5 PG (ref 27–31)
MCHC RBC AUTO-ENTMCNC: 33.7 G/DL (ref 33–36)
MCV RBC AUTO: 87.4 FL (ref 80–94)
MICROALBUMIN UR-MCNC: 23 UG/ML
MICROALBUMIN/CREAT RATIO PNL UR: 9.5 MG/GM CR (ref 0–30)
MONOCYTES # BLD AUTO: 0.56 X10(3)/MCL (ref 0.1–1.3)
MONOCYTES NFR BLD AUTO: 9.5 %
NEUTROPHILS # BLD AUTO: 2.99 X10(3)/MCL (ref 2.1–9.2)
NEUTROPHILS NFR BLD AUTO: 50.7 %
NRBC BLD AUTO-RTO: 0 %
PLATELET # BLD AUTO: 148 X10(3)/MCL (ref 130–400)
PMV BLD AUTO: 10.4 FL (ref 7.4–10.4)
POTASSIUM SERPL-SCNC: 3.9 MMOL/L (ref 3.5–5.1)
PROT SERPL-MCNC: 7.9 GM/DL (ref 5.8–7.6)
PSA SERPL-MCNC: 0.51 NG/ML
RBC # BLD AUTO: 4.92 X10(6)/MCL (ref 4.7–6.1)
SODIUM SERPL-SCNC: 137 MMOL/L (ref 136–145)
T4 FREE SERPL-MCNC: 0.77 NG/DL (ref 0.7–1.48)
TRIGL SERPL-MCNC: 164 MG/DL (ref 34–140)
TSH SERPL-ACNC: 0.57 UIU/ML (ref 0.35–4.94)
VLDLC SERPL CALC-MCNC: 33 MG/DL
WBC # BLD AUTO: 5.89 X10(3)/MCL (ref 4.5–11.5)

## 2024-12-17 PROCEDURE — 84153 ASSAY OF PSA TOTAL: CPT

## 2024-12-17 PROCEDURE — 99215 OFFICE O/P EST HI 40 MIN: CPT | Mod: PBBFAC

## 2024-12-17 PROCEDURE — 84443 ASSAY THYROID STIM HORMONE: CPT

## 2024-12-17 PROCEDURE — 85025 COMPLETE CBC W/AUTO DIFF WBC: CPT

## 2024-12-17 PROCEDURE — 80061 LIPID PANEL: CPT

## 2024-12-17 PROCEDURE — 80053 COMPREHEN METABOLIC PANEL: CPT

## 2024-12-17 PROCEDURE — 84439 ASSAY OF FREE THYROXINE: CPT

## 2024-12-17 PROCEDURE — 83036 HEMOGLOBIN GLYCOSYLATED A1C: CPT

## 2024-12-17 PROCEDURE — 82570 ASSAY OF URINE CREATININE: CPT

## 2024-12-17 PROCEDURE — 36415 COLL VENOUS BLD VENIPUNCTURE: CPT

## 2024-12-17 PROCEDURE — 99215 OFFICE O/P EST HI 40 MIN: CPT | Mod: S$PBB,,,

## 2024-12-17 RX ORDER — POTASSIUM CHLORIDE 750 MG/1
10 TABLET, EXTENDED RELEASE ORAL ONCE
COMMUNITY
End: 2024-12-17 | Stop reason: SDUPTHER

## 2024-12-17 RX ORDER — AMLODIPINE BESYLATE 10 MG/1
10 TABLET ORAL DAILY
Qty: 90 TABLET | Refills: 2 | Status: SHIPPED | OUTPATIENT
Start: 2024-12-17 | End: 2025-12-12

## 2024-12-17 RX ORDER — TADALAFIL 5 MG/1
5 TABLET ORAL DAILY PRN
Qty: 30 TABLET | Refills: 0 | Status: SHIPPED | OUTPATIENT
Start: 2024-12-17 | End: 2025-12-17

## 2024-12-17 RX ORDER — ATORVASTATIN CALCIUM 10 MG/1
10 TABLET, FILM COATED ORAL NIGHTLY
Qty: 90 TABLET | Refills: 3 | Status: SHIPPED | OUTPATIENT
Start: 2024-12-17 | End: 2025-12-17

## 2024-12-17 NOTE — PROGRESS NOTES
"    PATIENT NAME: Arcadio Scott  : 1960  DATE: 24  MRN: [id number]          Reason for Visit/Chief Complaint   Establish Care, Results, and Medication Refill       History of Present Illness (HPI)     Arcadio Scott is a 64 y.o. Black male presenting in clinic today Establish Care, Results, and Medication Refill. PMH: polyneuropathy, peripheral sensory motor axonal polyneuropathy (follows Neurology), lumbar degenerative disc disease, reading impairment, lumbar stenosis, alcohol abuse, hypertension, bilat cataracts (reports seeing a "glare" in left eye), anemia, transaminitis, and history of GERD. Reports fall without injury at home approx 1 month ago.    Was evaluated in [facility] cardio clinic on 10/4/2024 after an abnormal stress test. On 10/16/2024, patient had cardiac cath that shows normal coronary arteries. He reports being told by his previous provider that if his heart cath was ok he can have a trial of cialis.    [facility] neurology clinic for neuropathy - last office visit on 10/17/2024. Current tx: Lyrica to 100mg PO BID, continue with pain management in [place].   Pain mgmt (Dr. Taco Schwartz) in [place] for multilevel DDD and facet degenerative changes with multilevel canal and foraminal stenosis. Last office visit on 10/24/2024. Current tx: - MBB series of injections with goal to do RFA in the future of note, patient hesitant of injections, Mobic 7.5 mg daily and Robaxin 500 TID PRN.    10/30/2024-colonoscopy shows skin tags on perianal exam and multiple hyperplastic polyps in the rectum an dsigmoid colon, internal hemorrhoids (small). Repeat in 3 years.     [facility] urology clinic for hematuria. Last office visit on 2024. Patient seen by Dr. Rhodes for cystoscope was 2023 which was negative plan serial follow-up with a urine cytology 6 months x2 then yearly. PSA 0.51 on 2024.     Patient is c/o nodules in on his face on either cheek. States they were previously removed, but has " "grown back. Denies pain or drainage.    Former smoker. Denies alcohol or illicit drug use. Denies chest pain, shortness of breath, cough, headache, dizziness, weakness, abdominal pain, nausea, vomiting, diarrhea, constipation, dysuria, depression, anxiety, SI, and HI.     Pt has 1 or more chronic illnesses with severe exacerbation, progression, or side effects of treatment / 1 acute or chronic illness or injury that poses a threat to life or bodily function.  I have reviewed prior external notes / reviewed results of each unique test /ordered a unique test /  This assessment required an independent historian.  Management of this patient was discussed with an external physician / other qhp / or appropriate source that was not separately reported.  There is high risk of morbidity from additional diagnostic testing or treatment including prescription drug management. Diagnosis & treatment are significantly limited by social determinants of health.   I spent a total of 44 minutes reviewing the patient's chart prior to our face to face time, seeing the patient, speaking with nurse, and documenting in the record.  (Npt 60-74 mins / Est 40-54 mins)    Review of Systems     Review of Systems   Constitutional: Negative.    HENT: Negative.     Eyes: Negative.    Respiratory: Negative.     Cardiovascular: Negative.    Gastrointestinal: Negative.    Endocrine: Negative.    Genitourinary: Negative.    Musculoskeletal:  Positive for arthralgias and gait problem.   Skin: Negative.         "Bumps"   Allergic/Immunologic: Negative.    Hematological: Negative.    Psychiatric/Behavioral: Negative.     All other systems reviewed and are negative.      Medical / Social / Family History     Past Medical History:   Diagnosis Date    GERD (gastroesophageal reflux disease)     Hypertension     Neuropathy     Unspecified glaucoma          Past Surgical History:   Procedure Laterality Date    ANGIOGRAM, CORONARY, WITH LEFT HEART " CATHETERIZATION N/A 10/16/2024    Procedure: Angiogram, Coronary, with Left Heart Cath;  Surgeon: Chaitanya Cramer MD;  Location: [facility] CATH LAB;  Service: Cardiology;  Laterality: N/A;    CATARACT EXTRACTION W/  INTRAOCULAR LENS IMPLANT Right 8/13/2024    Procedure: EXTRACTION, CATARACT, WITH IOL INSERT- OD;  Surgeon: Duncan Arevalo MD;  Location: [facility] OR;  Service: Ophthalmology;  Laterality: Right;    CATARACT EXTRACTION W/  INTRAOCULAR LENS IMPLANT Left 9/24/2024    Procedure: EXTRACTION, CATARACT, WITH IOL INSERT- OS;  Surgeon: Duncan Arevalo MD;  Location: [facility] OR;  Service: Ophthalmology;  Laterality: Left;    COLONOSCOPY W/ POLYPECTOMY  01/28/2022    COLONOSCOPY, WITH POLYPECTOMY USING SNARE N/A 10/30/2023    Procedure: COLONOSCOPY, WITH POLYPECTOMY USING SNARE;  Surgeon: Ngoc Fenton MD;  Location: [facility] ENDOSCOPY;  Service: Gastroenterology;  Laterality: N/A;    COLONOSCOPY, WITH POLYPECTOMY USING SNARE N/A 10/30/2024    Procedure: COLONOSCOPY, WITH POLYPECTOMY USING SNARE;  Surgeon: Ngoc Fenton MD;  Location: [facility] ENDOSCOPY;  Service: Gastroenterology;  Laterality: N/A;    CYST REMOVAL      Facial    EGD, WITH CLOSED BIOPSY N/A 10/30/2023    Procedure: EGD;  Surgeon: Ngoc Fenton MD;  Location: [facility] ENDOSCOPY;  Service: Gastroenterology;  Laterality: N/A;    SURGICAL REMOVAL OF LESION OF FACE Bilateral 07/18/2022    Procedure: EXCISION, LESION, FACE AND NECK;  Surgeon: Marcelo Capps MD;  Location: [facility] OR;  Service: ENT;  Laterality: Bilateral;         Social History  Arcadio Scott's  reports that he has quit smoking. His smoking use included cigars and cigarettes. He has been exposed to tobacco smoke. He has never used smokeless tobacco. He reports that he does not currently use alcohol. He reports that he does not use drugs.    Family History  Arcadio Scott's family history includes Alzheimer's disease in his maternal grandmother; Aneurysm in his sister; Cancer in  "his father; Diabetes in his brother and mother; Heart disease in his mother; Stomach cancer in his brother.    Medications and Allergies     Medications  Current Outpatient Medications   Medication Instructions    amLODIPine (NORVASC) 10 mg, Oral, Daily    atorvastatin (LIPITOR) 10 mg, Oral, Nightly    mirabegron (MYRBETRIQ) 25 mg, Daily    moxifloxacin (VIGAMOX) 0.5 % ophthalmic solution 1 drop, 3 times daily    ofloxacin (OCUFLOX) 0.3 % ophthalmic solution 1 drop, 4 times daily    pantoprazole (PROTONIX) 40 mg, Oral    potassium chloride SA (K-DUR,KLOR-CON) 20 MEQ tablet 20 mEq, Oral, Daily    pregabalin (LYRICA) 100 mg, Oral, 2 times daily    tadalafiL (CIALIS) 5 mg, Oral, Daily PRN    tamsulosin (FLOMAX) 0.4 mg, Oral, Daily       Allergies  Review of patient's allergies indicates:  No Known Allergies    Physical Examination   Visit Vitals  /67 (BP Location: Left arm, Patient Position: Sitting)   Pulse 72   Resp 18   Ht 5' 9" (1.753 m)   Wt 107.7 kg (237 lb 6.4 oz)   SpO2 97%   BMI 35.06 kg/m²     Physical Exam  Vitals reviewed.   Constitutional:       Appearance: Normal appearance. He is normal weight.   HENT:      Head: Normocephalic.      Nose: Nose normal.      Mouth/Throat:      Mouth: Mucous membranes are moist.      Pharynx: Oropharynx is clear.   Eyes:      Extraocular Movements: Extraocular movements intact.      Conjunctiva/sclera: Conjunctivae normal.      Pupils: Pupils are equal, round, and reactive to light.   Cardiovascular:      Rate and Rhythm: Normal rate and regular rhythm.      Heart sounds: Normal heart sounds.   Pulmonary:      Effort: Pulmonary effort is normal.      Breath sounds: Normal breath sounds.   Abdominal:      General: Abdomen is flat. Bowel sounds are normal.      Palpations: Abdomen is soft.   Musculoskeletal:         General: Normal range of motion.      Cervical back: Normal range of motion.      Comments: Ambulates with straight cane.   Skin:     General: Skin is warm " "and dry.      Capillary Refill: Capillary refill takes less than 2 seconds.      Comments: Multiple firm, immobile nodules on either cheek of face   Neurological:      General: No focal deficit present.      Mental Status: He is alert and oriented to person, place, and time.   Psychiatric:         Mood and Affect: Mood normal.           Results     Lab Results   Component Value Date    WBC 5.89 12/17/2024    RBC 4.92 12/17/2024    HGB 14.5 12/17/2024    HCT 43.0 12/17/2024    MCV 87.4 12/17/2024    MCH 29.5 12/17/2024    MCHC 33.7 12/17/2024    RDW 14.6 12/17/2024     12/17/2024    MPV 10.4 12/17/2024      Lab Results   Component Value Date     12/17/2024    K 3.9 12/17/2024    CO2 24 12/17/2024    GLUCOSE 102 12/17/2024    BUN 11.3 12/17/2024    CREATININE 0.98 12/17/2024    LABPROT 7.9 (H) 12/17/2024    ALBUMIN 4.1 12/17/2024    BILITOT 0.5 12/17/2024    ALKPHOS 77 12/17/2024    AST 34 12/17/2024    ALT 18 12/17/2024    AGAP 5.0 12/17/2024    EGFRNORACEVR >60 12/17/2024     Lab Results   Component Value Date    TSH 0.568 12/17/2024     Lab Results   Component Value Date    CHOL 225 (H) 12/17/2024    HDL 49 12/17/2024    .00 (H) 12/17/2024    TRIG 164 (H) 12/17/2024     Lab Results   Component Value Date    SGUA 1.027 09/27/2023    PROTEINUA 3+ (A) 09/27/2023    BILIRUBINUA 2+ (A) 09/27/2023    WBCUA 6-10 (A) 09/27/2023    RBCUA 6-10 (A) 09/27/2023    BACTERIA Trace (A) 09/27/2023    LEUKOCYTESUR 25 (A) 09/27/2023    UROBILINOGEN 0.2 08/19/2024     Lab Results   Component Value Date    CREATRANDUR 241.9 (H) 12/17/2024    MICALBCREAT 9.5 12/17/2024     Lab Results   Component Value Date    FOLATE 7.0 06/07/2024     Lab Results   Component Value Date    HIV Nonreactive 04/12/2023    HEPAIGM Nonreactive 10/09/2023    HEPBSAG Nonreactive 10/09/2023    HEPCAB Nonreactive 10/09/2023     No results found for: "FITDIAG", "COLOGUARD"  No results found for: "OCCBLDIA"    Assessment and Plan (including " Health Maintenance)     Problem List Items Addressed This Visit          Neuro    DDD (degenerative disc disease), lumbar    Current Assessment & Plan     Managed by Pain mgmt (Dr. Taco Schwartz) in [place].            Derm    Epidermal cyst of face    Current Assessment & Plan        Referral placed to [facility] clinic to eval and treat.          Relevant Orders    Ambulatory referral/consult to Family Practice       Cardiac/Vascular    Hypertension - Primary    Current Assessment & Plan     BP Readings from Last 3 Encounters:   12/17/24 110/67   10/30/24 119/69   10/17/24 129/72      At goal.  Follow a low sodium (less than 2 grams of sodium per day), DASH diet.   Continue amlodipine as prescribed.  Monitor blood pressure and report any consistent values greater than 140/90 and keep a log.  Encouraged continued smoking cessation to aid in BP reduction and co-morbidities.   Maintain healthy weight with a BMI goal of <30.   Aerobic exercise for 150 minutes per week (or 5 days a week for 30 minutes each day).          Relevant Medications    amLODIPine (NORVASC) 10 MG tablet    Other Relevant Orders    CBC Auto Differential    Comprehensive Metabolic Panel    Urinalysis    Mixed hyperlipidemia    Current Assessment & Plan     Lab Results   Component Value Date    .00 (H) 12/17/2024       Lab Results   Component Value Date    TRIG 164 (H) 12/17/2024       Lab Results   Component Value Date    HDL 49 12/17/2024        Lab Results   Component Value Date    CHOL 225 (H) 12/17/2024      Rx atorvastatin.  Follow a low cholesterol, low saturated fat diet with less than 200 mg of cholesterol a day.   Avoid fried foods and high saturated fats.  Add flax seed or fish oil supplements to diet.   Increase dietary fiber.   Regular exercise improves cholesterol levels.  Physical activity 5 times a week for 30 minutes per day (or 150 minutes per week).   Stressed importance of dietary modifications.           Relevant Medications    atorvastatin (LIPITOR) 10 MG tablet    Other Relevant Orders    CBC Auto Differential    Comprehensive Metabolic Panel    Lipid Panel    Urinalysis       Renal/    BPH (benign prostatic hyperplasia)    Current Assessment & Plan     Managed by [facility] urology clinic.   Current tx: mirabegron 25 mg daily.         Erectile dysfunction    Current Assessment & Plan     On 10/16/2024, patient had cardiac cath that shows normal coronary arteries.  Denies CP.  Rx cialis.  Report to the ED immediately if you experience any of the following symptoms:  chest pain, lightheadedness, dizziness, SOB, hypotension, nausea, vomiting, or an erection that lasts longer than 4 hours.   Stop taking the medicine immediately!          Relevant Medications    tadalafiL (CIALIS) 5 MG tablet       Endocrine    Class 2 severe obesity due to excess calories with serious comorbidity and body mass index (BMI) of 35.0 to 35.9 in adult    Current Assessment & Plan     Body mass index is 35.06 kg/m².  Goal BMI <30.  Aerobic exercise 150 minutes per week.  Avoid soda, simple sugars, sweets, excessive rice, pasta, potatoes or bread.   Choose brown options when available and portion control.  Limit fast foods and fried foods.   Choose complex carbs in moderation (ex: green, leafy vegetables, beans, oatmeal).  Eat plenty of fresh fruits and vegetables with lean meats daily.   Consider permanent healthy lifestyle changes.             Other    Difficulty walking    Current Assessment & Plan     Ambulates with cane.             There are no preventive care reminders to display for this patient.  All of your core healthy metrics are met.      Health Maintenance Topics with due status: Not Due       Topic Last Completion Date    Colorectal Cancer Screening 10/30/2024    PROSTATE-SPECIFIC ANTIGEN 12/17/2024    Hemoglobin A1c (Diabetic Prevention Screening) 12/17/2024    Lipid Panel 12/17/2024       Future Appointments   Date Time  Provider Department Center   1/21/2025  2:00 PM Taco Schwartz MD [facility]   2/17/2025  7:30 AM Chaitanya Rodriguez NP [facility] UROLO [place]    2/28/2025 10:00 AM Felicita Ramirez, ANP [facility] NEURO [place]    4/17/2025  9:00 AM Felicita Ramirez, ANP [facility] NEURO [place]    6/17/2025  8:00 AM Janeen Schaefer FNP [facility] INTMED [place]         Follow up in about 6 months (around 6/17/2025) for F2F, Follow up, Med check, Lab review, RTC PRN.          Signature:        AN Ivory  [facility]  [address] [id number]    Date of encounter: 12/17/24

## 2024-12-17 NOTE — ASSESSMENT & PLAN NOTE
Managed by [facility] urology clinic.   Current tx: mirabegron 25 mg daily.

## 2024-12-17 NOTE — ASSESSMENT & PLAN NOTE
BP Readings from Last 3 Encounters:   12/17/24 110/67   10/30/24 119/69   10/17/24 129/72      At goal.  Follow a low sodium (less than 2 grams of sodium per day), DASH diet.   Continue amlodipine as prescribed.  Monitor blood pressure and report any consistent values greater than 140/90 and keep a log.  Encouraged continued smoking cessation to aid in BP reduction and co-morbidities.   Maintain healthy weight with a BMI goal of <30.   Aerobic exercise for 150 minutes per week (or 5 days a week for 30 minutes each day).

## 2024-12-17 NOTE — ASSESSMENT & PLAN NOTE
Body mass index is 35.06 kg/m².  Goal BMI <30.  Aerobic exercise 150 minutes per week.  Avoid soda, simple sugars, sweets, excessive rice, pasta, potatoes or bread.   Choose brown options when available and portion control.  Limit fast foods and fried foods.   Choose complex carbs in moderation (ex: green, leafy vegetables, beans, oatmeal).  Eat plenty of fresh fruits and vegetables with lean meats daily.   Consider permanent healthy lifestyle changes.

## 2024-12-17 NOTE — PATIENT INSTRUCTIONS
REMINDER: Please complete labs within 1 week of appointment.   Please complete satisfaction survey when received. Thank you.    Sotero Ervin,     If you are due for any health screening(s) below please notify me so we can arrange them to be ordered and scheduled. Most healthy patients at your age complete them, but you are free to accept or refuse.     If you can't do it, I'll definitely understand. If you can, I'd certainly appreciate it!    All of your core healthy metrics are met.

## 2024-12-17 NOTE — ASSESSMENT & PLAN NOTE
On 10/16/2024, patient had cardiac cath that shows normal coronary arteries.  Denies CP.  Rx cialis.  Report to the ED immediately if you experience any of the following symptoms:  chest pain, lightheadedness, dizziness, SOB, hypotension, nausea, vomiting, or an erection that lasts longer than 4 hours.   Stop taking the medicine immediately!

## 2024-12-17 NOTE — ASSESSMENT & PLAN NOTE
Lab Results   Component Value Date    .00 (H) 12/17/2024       Lab Results   Component Value Date    TRIG 164 (H) 12/17/2024       Lab Results   Component Value Date    HDL 49 12/17/2024        Lab Results   Component Value Date    CHOL 225 (H) 12/17/2024      Rx atorvastatin.  Follow a low cholesterol, low saturated fat diet with less than 200 mg of cholesterol a day.   Avoid fried foods and high saturated fats.  Add flax seed or fish oil supplements to diet.   Increase dietary fiber.   Regular exercise improves cholesterol levels.  Physical activity 5 times a week for 30 minutes per day (or 150 minutes per week).   Stressed importance of dietary modifications.

## 2025-01-02 DIAGNOSIS — E87.6 HYPOKALEMIA: ICD-10-CM

## 2025-01-02 RX ORDER — POTASSIUM CHLORIDE 20 MEQ/1
20 TABLET, EXTENDED RELEASE ORAL DAILY
Qty: 90 TABLET | Refills: 1 | Status: SHIPPED | OUTPATIENT
Start: 2025-01-02

## 2025-01-15 ENCOUNTER — TELEPHONE (OUTPATIENT)
Dept: NEUROLOGY | Facility: CLINIC | Age: 65
End: 2025-01-15
Payer: MEDICAID

## 2025-01-15 NOTE — TELEPHONE ENCOUNTER
----- Message from Meryl sent at 1/15/2025  2:07 PM CST -----  Regarding: please advise  Pt called Clinic and stated that he canceled appt in [place] because he doesn't have transportation. Pt wants to know can he see someone closer to home? Pt can be reached at [phone].    Thank You

## 2025-01-15 NOTE — TELEPHONE ENCOUNTER
Please let him know that no one near here takes [facility] patients who are under 66 YO. He can call his insurance and ask them for transportation

## 2025-01-25 NOTE — TELEPHONE ENCOUNTER
Was there any trauma involved? If he needs to go to walk in clinic or ED he can while we are waiting for his imaging DC instructions

## 2025-02-17 ENCOUNTER — OFFICE VISIT (OUTPATIENT)
Dept: UROLOGY | Facility: CLINIC | Age: 65
End: 2025-02-17
Payer: MEDICARE

## 2025-02-17 VITALS
OXYGEN SATURATION: 98 % | RESPIRATION RATE: 20 BRPM | DIASTOLIC BLOOD PRESSURE: 71 MMHG | TEMPERATURE: 98 F | BODY MASS INDEX: 34.04 KG/M2 | HEART RATE: 70 BPM | SYSTOLIC BLOOD PRESSURE: 131 MMHG | HEIGHT: 69 IN | WEIGHT: 229.81 LBS

## 2025-02-17 DIAGNOSIS — N40.0 BENIGN PROSTATIC HYPERPLASIA, UNSPECIFIED WHETHER LOWER URINARY TRACT SYMPTOMS PRESENT: ICD-10-CM

## 2025-02-17 DIAGNOSIS — R31.9 HEMATURIA, UNSPECIFIED TYPE: Primary | ICD-10-CM

## 2025-02-17 LAB
BILIRUB SERPL-MCNC: NORMAL MG/DL
BLOOD URINE, POC: NORMAL
COLOR, POC UA: NORMAL
GLUCOSE UR QL STRIP: NORMAL
KETONES UR QL STRIP: NORMAL
LEUKOCYTE ESTERASE URINE, POC: NORMAL
NITRITE, POC UA: NORMAL
PH, POC UA: 5.5
PROTEIN, POC: 100
SPECIFIC GRAVITY, POC UA: >=1.03
UROBILINOGEN, POC UA: 0.2

## 2025-02-17 PROCEDURE — 88108 CYTOPATH CONCENTRATE TECH: CPT | Mod: TC | Performed by: NURSE PRACTITIONER

## 2025-02-17 PROCEDURE — 81001 URINALYSIS AUTO W/SCOPE: CPT | Mod: PBBFAC | Performed by: NURSE PRACTITIONER

## 2025-02-17 PROCEDURE — 99214 OFFICE O/P EST MOD 30 MIN: CPT | Mod: PBBFAC | Performed by: NURSE PRACTITIONER

## 2025-02-17 NOTE — PROGRESS NOTES
Placed in room. Seen by Dennis Rodriguez NP. Spoke with patient. 1 week virtual. RTC in 6 months with a PSA.

## 2025-02-17 NOTE — PROGRESS NOTES
Chief Complaint:   Chief Complaint   Patient presents with    hematuria-BPH       HPI:   Patient is a 64-year-old male 6 month follow-up for microscopic hematuria, & BPH.    Patient treated by PCP with a random UA noted to have trace RBCs and a urine sample.     Patient seen by Dr. Rhodes for cystoscope was 07/13/2023 which was negative plan serial follow-up with a urine cytology 6 months x2 then yearly.    PSA 0.56 on 06/07/2024.    On patient's  previous visit he presented with urinary urgency, intermittent urinary incontinence due to urgency, urinary frequency of 7-10 times during the day and 4-5 times at night/nocturia.  Myrbetriq 25 mg p.o. daily started.  Today patient is much improved with symptoms due to Myrbetriq and tamsulosin dosing.  Patient tamsulosin 0 4 mg p.o. daily & Myrbetriq 25 mg was p.o. daily.  Patient denies any dysuria, urinary retention, gross hematuria.    Patient current PSA 0.51.      Allergies:  Review of patient's allergies indicates:  No Known Allergies    Medications:  Current Medications[1]    Review of Systems:  General: No fever, chills, fatigability, or weight loss.  Skin: No rashes, itching, or changes in color or texture of skin.  Chest: Denies GRANT, cyanosis, wheezing, cough, and sputum production.  Abdomen: Appetite fine. No weight loss. Denies diarrhea, abdominal pain, hematemesis, or blood in stool.  Musculoskeletal: No joint stiffness or swelling. Denies back pain.  : As above.  All other review of systems negative.    PMH:  Past Medical History:   Diagnosis Date    GERD (gastroesophageal reflux disease)     Hypertension     Neuropathy     Unspecified glaucoma        PSH:  Past Surgical History:   Procedure Laterality Date    ANGIOGRAM, CORONARY, WITH LEFT HEART CATHETERIZATION N/A 10/16/2024    Procedure: Angiogram, Coronary, with Left Heart Cath;  Surgeon: Chaitanya Cramer MD;  Location: [facility] CATH LAB;  Service: Cardiology;  Laterality: N/A;    CATARACT EXTRACTION W/   INTRAOCULAR LENS IMPLANT Right 8/13/2024    Procedure: EXTRACTION, CATARACT, WITH IOL INSERT- OD;  Surgeon: Duncan Arevalo MD;  Location: [facility] OR;  Service: Ophthalmology;  Laterality: Right;    CATARACT EXTRACTION W/  INTRAOCULAR LENS IMPLANT Left 9/24/2024    Procedure: EXTRACTION, CATARACT, WITH IOL INSERT- OS;  Surgeon: Duncan Arevalo MD;  Location: [facility] OR;  Service: Ophthalmology;  Laterality: Left;    COLONOSCOPY W/ POLYPECTOMY  01/28/2022    COLONOSCOPY, WITH POLYPECTOMY USING SNARE N/A 10/30/2023    Procedure: COLONOSCOPY, WITH POLYPECTOMY USING SNARE;  Surgeon: Ngoc Fenton MD;  Location: [facility] ENDOSCOPY;  Service: Gastroenterology;  Laterality: N/A;    COLONOSCOPY, WITH POLYPECTOMY USING SNARE N/A 10/30/2024    Procedure: COLONOSCOPY, WITH POLYPECTOMY USING SNARE;  Surgeon: Ngoc Fenton MD;  Location: [facility] ENDOSCOPY;  Service: Gastroenterology;  Laterality: N/A;    CYST REMOVAL      Facial    EGD, WITH CLOSED BIOPSY N/A 10/30/2023    Procedure: EGD;  Surgeon: Ngoc Fenton MD;  Location: [facility] ENDOSCOPY;  Service: Gastroenterology;  Laterality: N/A;    SURGICAL REMOVAL OF LESION OF FACE Bilateral 07/18/2022    Procedure: EXCISION, LESION, FACE AND NECK;  Surgeon: Marcelo Capps MD;  Location: [facility] OR;  Service: ENT;  Laterality: Bilateral;       FamHx:  Family History   Problem Relation Name Age of Onset    Diabetes Mother      Heart disease Mother      Cancer Father          Unsure of source    Aneurysm Sister      Diabetes Brother      Stomach cancer Brother      Alzheimer's disease Maternal Grandmother         SocHx:  Social History[2]    Physical Exam:  Vitals:    02/17/25 0814   BP: 131/71   Pulse: 70   Resp: 20   Temp: 98.2 °F (36.8 °C)     General: A&Ox3, no apparent distress, no deformities  Neck: No masses, normal thyroid  Lungs: CTA kayleigh, no use of accessory muscles  Heart: RRR, no arrhythmias  Abdomen: Soft, NT, ND, no masses, no hernias, no  hepatosplenomegaly  Lymphatic: Neck and groin nodes negative  Skin: The skin is warm and dry. No jaundice.  Ext: No c/c/e.    GUMale:  Patient deferred DICK today.  Labs:    Latest Reference Range & Units 02/14/22 09:56 03/07/23 12:06 10/09/23 09:26 06/07/24 09:27 12/17/24 09:35   Prostate Specific Antigen <=4.00 ng/mL 0.50 0.82 0.88 0.56 0.51       Urinalysis:  Results for orders placed or performed in visit on 02/17/25   POCT URINE DIPSTICK WITH MICROSCOPE, AUTOMATED   Result Value Ref Range    Color, UA Leatha     Spec Grav UA >=1.030     pH, UA 5.5     WBC, UA neg     Nitrite, UA neg     Protein,      Glucose, UA neg     Ketones, UA trace     Urobilinogen, UA 0.2     Bilirubin, POC neg     Blood, UA small    Microscopic urinalysis reveals small RBCs 1-3 per HPF, negative WBCs, negative nitrites.          Impression:  1. Hematuria, unspecified type  - POCT URINE DIPSTICK WITH MICROSCOPE, AUTOMATED    2. Benign prostatic hyperplasia, unspecified whether lower urinary tract symptoms present  - POCT URINE DIPSTICK WITH MICROSCOPE, AUTOMATED      Plan:  Instructed patient to continue tamsulosin 0 4 mg p.o. daily and Myrbetriq 25 p.o. daily.  Instructed patient will notify him of urine cytology results in 1 week.  Instructed patient on timed voiding every 2-3 hrs, double voiding, avoidance of bladder irritants such as alcohol, citrus foods, chocolate, caffeinated drinks, energy drinks, spicy foods, sugar, caffeine products, sodas. Instructed patient to avoid drinking fluids 1-2 hours prior to bedtime & void immediately before bedtime.   RTC 6 months with urine cytology PSA  Instructed patient if develops any abnormal urologic symptoms notify clinic to be re-evaluate treated or during after hours go to emergency room versus urgent here.                           [facility]         [1]   Current Outpatient Medications   Medication Sig Dispense Refill    amLODIPine (NORVASC) 10 MG tablet Take 1 tablet (10 mg total) by  mouth once daily. 90 tablet 2    atorvastatin (LIPITOR) 10 MG tablet Take 1 tablet (10 mg total) by mouth every evening. 90 tablet 3    meloxicam (MOBIC) 7.5 MG tablet Take 1 tablet (7.5 mg total) by mouth daily as needed for Pain. 30 tablet 0    mirabegron (MYRBETRIQ) 25 mg Tb24 ER tablet Take 25 mg by mouth once daily.      moxifloxacin (VIGAMOX) 0.5 % ophthalmic solution 1 drop 3 (three) times daily.      ofloxacin (OCUFLOX) 0.3 % ophthalmic solution Place 1 drop into the left eye 4 (four) times daily.      pantoprazole (PROTONIX) 40 MG tablet TAKE 1 TABLET BY MOUTH EVERY DAY 30 tablet 11    potassium chloride SA (K-DUR,KLOR-CON) 20 MEQ tablet Take 1 tablet (20 mEq total) by mouth once daily. 90 tablet 1    pregabalin (LYRICA) 100 MG capsule Take 1 capsule (100 mg total) by mouth 2 (two) times daily. 60 capsule 3    tadalafiL (CIALIS) 5 MG tablet Take 1 tablet (5 mg total) by mouth daily as needed for Erectile Dysfunction. 30 tablet 0    tamsulosin (FLOMAX) 0.4 mg Cap Take 1 capsule (0.4 mg total) by mouth once daily. 90 capsule 3     Current Facility-Administered Medications   Medication Dose Route Frequency Provider Last Rate Last Admin    sodium chloride 0.9% flush 10 mL  10 mL Intravenous PRN Chaitanya Cramer MD         Facility-Administered Medications Ordered in Other Visits   Medication Dose Route Frequency Provider Last Rate Last Admin    lactated ringers infusion   Intravenous Continuous Deanna Gaona MD 10 mL/hr at 10/30/23 0808 New Bag at 10/30/23 0808    LIDOcaine (PF) 10 mg/ml (1%) injection 10 mg  1 mL Intradermal Once Marina De La Torre, AN       [2]   Social History  Socioeconomic History    Marital status: Single    Number of children: 0   Tobacco Use    Smoking status: Former     Types: Cigars, Cigarettes     Passive exposure: Current    Smokeless tobacco: Never    Tobacco comments:     Quit cigarettes in 2019, smokes cigars 1-2 times a week   Substance and Sexual Activity     Alcohol use: Not Currently     Comment: occasional    Drug use: Never    Sexual activity: Not Currently     Social Drivers of Health     Financial Resource Strain: Low Risk  (9/28/2023)    Overall Financial Resource Strain (CARDIA)     Difficulty of Paying Living Expenses: Not hard at all   Food Insecurity: No Food Insecurity (9/28/2023)    Hunger Vital Sign     Worried About Running Out of Food in the Last Year: Never true     Ran Out of Food in the Last Year: Never true   Transportation Needs: No Transportation Needs (9/28/2023)    PRAPARE - Transportation     Lack of Transportation (Medical): No     Lack of Transportation (Non-Medical): No   Physical Activity: Inactive (9/28/2023)    Exercise Vital Sign     Days of Exercise per Week: 0 days     Minutes of Exercise per Session: 0 min   Stress: No Stress Concern Present (9/28/2023)    [employer] [place] [employer] - Occupational Stress Questionnaire     Feeling of Stress : Not at all   Housing Stability: Unknown (6/17/2024)    Housing Stability Vital Sign     Unable to Pay for Housing in the Last Year: No     Homeless in the Last Year: No

## 2025-02-24 ENCOUNTER — OFFICE VISIT (OUTPATIENT)
Dept: UROLOGY | Facility: CLINIC | Age: 65
End: 2025-02-24
Payer: MEDICARE

## 2025-02-24 DIAGNOSIS — R31.9 HEMATURIA, UNSPECIFIED TYPE: Primary | ICD-10-CM

## 2025-02-24 PROCEDURE — 98013 SYNCH AUDIO-ONLY EST LOW 20: CPT | Mod: 93,,, | Performed by: NURSE PRACTITIONER

## 2025-02-24 PROCEDURE — 1160F RVW MEDS BY RX/DR IN RCRD: CPT | Mod: CPTII,93,, | Performed by: NURSE PRACTITIONER

## 2025-02-24 PROCEDURE — 1159F MED LIST DOCD IN RCRD: CPT | Mod: CPTII,93,, | Performed by: NURSE PRACTITIONER

## 2025-02-24 NOTE — PROGRESS NOTES
Established Patient - Audio Only Telehealth Visit     The patient location is: home  The chief complaint leading to consultation is:  Hematuria  Visit type: Virtual visit with audio only (telephone)  Total time spent with patient:  20  minutes  Total time spent with medical discussion patient: 15 minutes     The reason for the audio only service rather than synchronous audio virtual visit was related to technical difficulties or patient preference/necessity.     Each patient to whom I provide medical services by telemedicine is:  (1) informed of the relationship between the physician and patient and the respective role of any other health care provider with respect to management of the patient; and (2) notified that they may decline to receive medical services by telemedicine and may withdraw from such care at any time. Patient verbally consented to receive this service via voice-only telephone call.     This service was not originating from a related E/M service provided within the previous 7 days nor will  to an E/M service or procedure within the next 24 hours or my soonest available appointment.  Prevailing standard of care was able to be met in this audio-only visit.  Chief Complaint:  Asymptomatic hematuria    HPI:   Patient is a 64-year-old male 1 week follow-up for microscopic hematuria, with a urine cytology results.    Patient treated by PCP with a random UA noted to have trace RBCs and a urine sample.     Patient seen by Dr. Rhodes for cystoscope was 07/13/2023 which was negative plan serial follow-up with a urine cytology 6 months x2 then yearly.    PSA 0.56 on 06/07/2024.    On patient's  previous visit he presented with urinary urgency, intermittent urinary incontinence due to urgency, urinary frequency of 7-10 times during the day and 4-5 times at night/nocturia.  Myrbetriq 25 mg p.o. daily started.  Patient is much improved with symptoms due to Myrbetriq and tamsulosin dosing.  Patient  tamsulosin 0 4 mg p.o. daily & Myrbetriq 25 mg was p.o. daily.  Discussed with patient at length negative urethral carcinoma or malignancy in the bladder.    Allergies:  Review of patient's allergies indicates:  No Known Allergies    Medications:  Current Medications[1]    Review of Systems:  General: No fever, chills, fatigability, or weight loss.  Skin: No rashes, itching, or changes in color or texture of skin.  Chest: Denies GRANT, cyanosis, wheezing, cough, and sputum production.  Abdomen: Appetite fine. No weight loss. Denies diarrhea, abdominal pain, hematemesis, or blood in stool.  Musculoskeletal: No joint stiffness or swelling. Denies back pain.  : As above.  All other review of systems negative.    PMH:  Past Medical History:   Diagnosis Date    GERD (gastroesophageal reflux disease)     Hypertension     Neuropathy     Unspecified glaucoma        PSH:  Past Surgical History:   Procedure Laterality Date    ANGIOGRAM, CORONARY, WITH LEFT HEART CATHETERIZATION N/A 10/16/2024    Procedure: Angiogram, Coronary, with Left Heart Cath;  Surgeon: Chaitanya Cramer MD;  Location: [facility] CATH LAB;  Service: Cardiology;  Laterality: N/A;    CATARACT EXTRACTION W/  INTRAOCULAR LENS IMPLANT Right 8/13/2024    Procedure: EXTRACTION, CATARACT, WITH IOL INSERT- OD;  Surgeon: Duncan Arevalo MD;  Location: [facility] OR;  Service: Ophthalmology;  Laterality: Right;    CATARACT EXTRACTION W/  INTRAOCULAR LENS IMPLANT Left 9/24/2024    Procedure: EXTRACTION, CATARACT, WITH IOL INSERT- OS;  Surgeon: Duncan Arevalo MD;  Location: [facility] OR;  Service: Ophthalmology;  Laterality: Left;    COLONOSCOPY W/ POLYPECTOMY  01/28/2022    COLONOSCOPY, WITH POLYPECTOMY USING SNARE N/A 10/30/2023    Procedure: COLONOSCOPY, WITH POLYPECTOMY USING SNARE;  Surgeon: Ngoc Fenton MD;  Location: [facility] ENDOSCOPY;  Service: Gastroenterology;  Laterality: N/A;    COLONOSCOPY, WITH POLYPECTOMY USING SNARE N/A 10/30/2024    Procedure:  COLONOSCOPY, WITH POLYPECTOMY USING SNARE;  Surgeon: Ngoc Fenton MD;  Location: [facility] ENDOSCOPY;  Service: Gastroenterology;  Laterality: N/A;    CYST REMOVAL      Facial    EGD, WITH CLOSED BIOPSY N/A 10/30/2023    Procedure: EGD;  Surgeon: Ngoc Fenton MD;  Location: [facility] ENDOSCOPY;  Service: Gastroenterology;  Laterality: N/A;    SURGICAL REMOVAL OF LESION OF FACE Bilateral 07/18/2022    Procedure: EXCISION, LESION, FACE AND NECK;  Surgeon: Marcelo Capps MD;  Location: [facility] OR;  Service: ENT;  Laterality: Bilateral;       FamHx:  Family History   Problem Relation Name Age of Onset    Diabetes Mother      Heart disease Mother      Cancer Father          Unsure of source    Aneurysm Sister      Diabetes Brother      Stomach cancer Brother      Alzheimer's disease Maternal Grandmother         SocHx:  Social History[2]    Physical Exam:  There were no vitals filed for this visit.      Impression:  Microscopic hematuria  BPH    Plan:  Instructed patient to continue Myrbetriq and tamsulosin dosing.  Instructed patient on timed voiding every 2-3 hrs, double voiding, avoidance of bladder irritants such as alcohol, citrus foods, chocolate, caffeinated drinks, energy drinks, spicy foods, sugar, caffeine products, sodas. Instructed patient to avoid drinking fluids 1-2 hours prior to bedtime & void immediately before bedtime.   RTC 6 months with urine cytology  Instructed patient if develops any abnormal urologic symptoms notify clinic to be re-evaluate treated or during after hours go to emergency room versus urgent here.                           [facility]       [1]   Current Outpatient Medications   Medication Sig Dispense Refill    amLODIPine (NORVASC) 10 MG tablet Take 1 tablet (10 mg total) by mouth once daily. 90 tablet 2    atorvastatin (LIPITOR) 10 MG tablet Take 1 tablet (10 mg total) by mouth every evening. 90 tablet 3    meloxicam (MOBIC) 7.5 MG tablet Take 1 tablet (7.5 mg total) by mouth daily  as needed for Pain. 30 tablet 0    mirabegron (MYRBETRIQ) 25 mg Tb24 ER tablet Take 25 mg by mouth once daily.      moxifloxacin (VIGAMOX) 0.5 % ophthalmic solution 1 drop 3 (three) times daily.      ofloxacin (OCUFLOX) 0.3 % ophthalmic solution Place 1 drop into the left eye 4 (four) times daily.      pantoprazole (PROTONIX) 40 MG tablet TAKE 1 TABLET BY MOUTH EVERY DAY 30 tablet 11    potassium chloride SA (K-DUR,KLOR-CON) 20 MEQ tablet Take 1 tablet (20 mEq total) by mouth once daily. 90 tablet 1    pregabalin (LYRICA) 100 MG capsule Take 1 capsule (100 mg total) by mouth 2 (two) times daily. 60 capsule 3    tadalafiL (CIALIS) 5 MG tablet Take 1 tablet (5 mg total) by mouth daily as needed for Erectile Dysfunction. 30 tablet 0    tamsulosin (FLOMAX) 0.4 mg Cap Take 1 capsule (0.4 mg total) by mouth once daily. 90 capsule 3     Current Facility-Administered Medications   Medication Dose Route Frequency Provider Last Rate Last Admin    sodium chloride 0.9% flush 10 mL  10 mL Intravenous PRN Chaitanya Cramer MD         Facility-Administered Medications Ordered in Other Visits   Medication Dose Route Frequency Provider Last Rate Last Admin    lactated ringers infusion   Intravenous Continuous Deanna Gaona MD 10 mL/hr at 10/30/23 0808 New Bag at 10/30/23 0808    LIDOcaine (PF) 10 mg/ml (1%) injection 10 mg  1 mL Intradermal Once Marina De La Torre, FNP       [2]   Social History  Socioeconomic History    Marital status: Single    Number of children: 0   Tobacco Use    Smoking status: Former     Types: Cigars, Cigarettes     Passive exposure: Current    Smokeless tobacco: Never    Tobacco comments:     Quit cigarettes in 2019, smokes cigars 1-2 times a week   Substance and Sexual Activity    Alcohol use: Not Currently     Comment: occasional    Drug use: Never    Sexual activity: Not Currently     Social Drivers of Health     Financial Resource Strain: Low Risk  (9/28/2023)    Overall Financial Resource  Strain (CARDIA)     Difficulty of Paying Living Expenses: Not hard at all   Food Insecurity: No Food Insecurity (9/28/2023)    Hunger Vital Sign     Worried About Running Out of Food in the Last Year: Never true     Ran Out of Food in the Last Year: Never true   Transportation Needs: No Transportation Needs (9/28/2023)    PRAPARE - Transportation     Lack of Transportation (Medical): No     Lack of Transportation (Non-Medical): No   Physical Activity: Inactive (9/28/2023)    Exercise Vital Sign     Days of Exercise per Week: 0 days     Minutes of Exercise per Session: 0 min   Stress: No Stress Concern Present (9/28/2023)    [employer] [place] [employer] - Occupational Stress Questionnaire     Feeling of Stress : Not at all   Housing Stability: Unknown (6/17/2024)    Housing Stability Vital Sign     Unable to Pay for Housing in the Last Year: No     Homeless in the Last Year: No

## 2025-03-11 ENCOUNTER — OFFICE VISIT (OUTPATIENT)
Dept: FAMILY MEDICINE | Facility: CLINIC | Age: 65
End: 2025-03-11
Payer: MEDICARE

## 2025-03-11 VITALS
TEMPERATURE: 99 F | OXYGEN SATURATION: 99 % | SYSTOLIC BLOOD PRESSURE: 118 MMHG | DIASTOLIC BLOOD PRESSURE: 60 MMHG | BODY MASS INDEX: 33.18 KG/M2 | HEIGHT: 69 IN | RESPIRATION RATE: 18 BRPM | HEART RATE: 94 BPM | WEIGHT: 224 LBS

## 2025-03-11 DIAGNOSIS — L72.0 EPIDERMAL CYST OF FACE: Primary | ICD-10-CM

## 2025-03-11 PROCEDURE — 99214 OFFICE O/P EST MOD 30 MIN: CPT | Mod: PBBFAC

## 2025-03-11 NOTE — PROGRESS NOTES
"[facility] MINOR SURGERY OFFICE VISIT NOTE  MRN: [id number]  Date: 03/11/2025    Chief Complaint: lesion on face      Subjective:    HPI  Arcadio Scott is a 64 y.o. male  presenting to [facility] minor surgery [facility] for multiple cyst located on bilateral cheeks and face.  Patient states that lesions were previously removed surgically from his face in 2022.  Upon chart review, it appears that cysts were removed by [facility] on 07/18/2022 and pathology report returned as multiple epidermal inclusion cysts for a total of 7 specimen sent to lab.  Patient denies pain 2 facial lesions; he also denies fevers, exponential growth.  He admits that the smaller lesions were able to be "popped" with pus being expressed resulting in decreased size of lesions.  Patient states that larger lesion to the left cheek is unable to be popped and has been present for about 1 year now.       ROS per HPI above.    Current Medications:   Outpatient Medications as of 3/11/2025   Medication Sig Dispense Refill    amLODIPine (NORVASC) 10 MG tablet Take 1 tablet (10 mg total) by mouth once daily. 90 tablet 2    atorvastatin (LIPITOR) 10 MG tablet Take 1 tablet (10 mg total) by mouth every evening. 90 tablet 3    meloxicam (MOBIC) 7.5 MG tablet Take 1 tablet (7.5 mg total) by mouth daily as needed for Pain. 30 tablet 0    mirabegron (MYRBETRIQ) 25 mg Tb24 ER tablet Take 25 mg by mouth once daily.      moxifloxacin (VIGAMOX) 0.5 % ophthalmic solution 1 drop 3 (three) times daily.      ofloxacin (OCUFLOX) 0.3 % ophthalmic solution Place 1 drop into the left eye 4 (four) times daily.      pantoprazole (PROTONIX) 40 MG tablet TAKE 1 TABLET BY MOUTH EVERY DAY 30 tablet 11    potassium chloride SA (K-DUR,KLOR-CON) 20 MEQ tablet Take 1 tablet (20 mEq total) by mouth once daily. 90 tablet 1    pregabalin (LYRICA) 100 MG capsule Take 1 capsule (100 mg total) by mouth 2 (two) times daily. 60 capsule 3    tadalafiL (CIALIS) 5 MG tablet Take 1 tablet (5 mg total) by mouth " "daily as needed for Erectile Dysfunction. 30 tablet 0    tamsulosin (FLOMAX) 0.4 mg Cap Take 1 capsule (0.4 mg total) by mouth once daily. 90 capsule 3     Facility-Administered Medications as of 3/11/2025   Medication Dose Route Frequency Provider Last Rate Last Admin    lactated ringers infusion   Intravenous Continuous Deanna Gaona MD 10 mL/hr at 10/30/23 0808 New Bag at 10/30/23 0808    LIDOcaine (PF) 10 mg/ml (1%) injection 10 mg  1 mL Intradermal Once Marina De La Torre, FNMARCO A        sodium chloride 0.9% flush 10 mL  10 mL Intravenous PRN Chaitanya Cramer MD            Objective:  Vitals:    03/11/25 0938   BP: 118/60   Patient Position: Sitting   Pulse: 94   Resp: 18   Temp: 98.6 °F (37 °C)   SpO2: 99%   Weight: 101.6 kg (224 lb)   Height: 5' 8.9" (1.75 m)       Physical Exam  Constitutional:       General: He is not in acute distress.     Appearance: He is not ill-appearing.   Pulmonary:      Effort: Pulmonary effort is normal. No respiratory distress.   Skin:     Comments: Round, cystic appearing soft but immobile, nontender, skin colored lesion located at patient's left superior lateral cheek.  Other more flat, but palpable, skin colored, cystic appearing, nontender lesions located to more generalized area of bilateral cheeks.  No active bleeding and no pus able to be expressed on examination. (see media below)               Assessment/ Plan:    Epidermal cyst of face  - Discussed risks and benefits of surgical intervention at this time versus excising a larger lesion on left superior lateral cheek; patient agreeable to follow up with [facility]NT as soon as possible to discuss further surgical intervention for removal of all current existing cystic appearing lesions.  - Ambulatory referral/consult to [facility] ENT due to apparent deeper nature of cysts      Follow up if symptoms worsen or fail to improve.       Yessica Clark DO  [employer]  Resident, HO-3     "

## 2025-04-10 ENCOUNTER — TELEPHONE (OUTPATIENT)
Dept: NEUROLOGY | Facility: CLINIC | Age: 65
End: 2025-04-10
Payer: MEDICARE

## 2025-04-17 ENCOUNTER — OFFICE VISIT (OUTPATIENT)
Dept: NEUROLOGY | Facility: CLINIC | Age: 65
End: 2025-04-17

## 2025-04-17 VITALS
WEIGHT: 224 LBS | TEMPERATURE: 99 F | DIASTOLIC BLOOD PRESSURE: 72 MMHG | HEART RATE: 66 BPM | RESPIRATION RATE: 16 BRPM | HEIGHT: 68 IN | OXYGEN SATURATION: 97 % | BODY MASS INDEX: 33.95 KG/M2 | SYSTOLIC BLOOD PRESSURE: 117 MMHG

## 2025-04-17 DIAGNOSIS — E66.812 CLASS 2 SEVERE OBESITY DUE TO EXCESS CALORIES WITH SERIOUS COMORBIDITY AND BODY MASS INDEX (BMI) OF 35.0 TO 35.9 IN ADULT: Chronic | ICD-10-CM

## 2025-04-17 DIAGNOSIS — G60.8 PERIPHERAL SENSORY-MOTOR AXONAL POLYNEUROPATHY: Primary | Chronic | ICD-10-CM

## 2025-04-17 DIAGNOSIS — R26.2 DIFFICULTY WALKING: Chronic | ICD-10-CM

## 2025-04-17 DIAGNOSIS — R29.6 FALLS: ICD-10-CM

## 2025-04-17 DIAGNOSIS — M48.061 SPINAL STENOSIS OF LUMBAR REGION WITHOUT NEUROGENIC CLAUDICATION: Chronic | ICD-10-CM

## 2025-04-17 DIAGNOSIS — E66.01 CLASS 2 SEVERE OBESITY DUE TO EXCESS CALORIES WITH SERIOUS COMORBIDITY AND BODY MASS INDEX (BMI) OF 35.0 TO 35.9 IN ADULT: Chronic | ICD-10-CM

## 2025-04-17 PROBLEM — M51.369 DDD (DEGENERATIVE DISC DISEASE), LUMBAR: Chronic | Status: ACTIVE | Noted: 2023-10-26

## 2025-04-17 PROBLEM — F81.0 READING IMPAIRMENT: Chronic | Status: ACTIVE | Noted: 2023-10-26

## 2025-04-17 RX ORDER — PREGABALIN 100 MG/1
100 CAPSULE ORAL 2 TIMES DAILY
Qty: 60 CAPSULE | Refills: 3 | Status: SHIPPED | OUTPATIENT
Start: 2025-04-17 | End: 2026-04-17

## 2025-04-17 NOTE — PROGRESS NOTES
"[facility] Neurology Follow Up Office Visit Note    Initial Visit Date: 1/10/2023  Last Visit Date: 10/17/2024  Current Visit Date:  04/17/2025    Chief Complaint:     Chief Complaint   Patient presents with    Peripheral Neuropathy     Pt states no complaints     History of Present Illness:      This is 64 y.o.  male with hx of chronic lower back pain, alcohol dependence, HTN, HLD, BPH, hypokalemia, ED, anemia, H. Pylori, transaminitis, hx of colonic polyps who was referred for bilateral lower extremity numbness for 1 + year.  During that visit, Lyrica was increased to 100mg PO TID.    Interim Hx:  Today, Pt states neuropathy controlled w/Lyrica 100 mg Qday-BID. Was evaluated by Dr. Schwartz (pain management) in [place] on 10/24/2024, who recommended MBB/RFA. Pt did not wish to proceed at that time. Neuropathy improved w/elevating legs. Worsens with activity. Interferes w/ADLs. Drinks <1-2 beers weekly. Decreased cigar smoking to 1-2/week. Not employed at this time. States he tries to perform chair exercises throughout the day. "My feet feel dead" (points from toes to balls of feet). Continues w/R lower back pain that he has had for years, but more consistent for several few months. Pain scale 9/10, described as aching. Interferes w/ADLs. Worse with prolonged sitting, improves w/standing. Has been evaluated by Dr. Elaine 6/26/2024, who discuss options, including surgery. Did not wish to proceed. Admits to one fall as he slipped. Did not require medical attention.     Presenting Hx:  Patient states that he had bilateral LBP radiating down bilateral LE, L>R. He reports decreased sensation in bilateral feet, left worse than right. Denied any trip and falls. Reports bilateral knee pain, left > right. Drinks alcohol throughout the week, drinking around a case of beer every 2 weeks. Quit smoking in 11/2019. Denied any saddle anesthesia. Denied any urinary incontinence. 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   Medications:     Current Outpatient Medications on File Prior to Visit   Medication Sig Dispense Refill    amLODIPine (NORVASC) 10 MG tablet Take 1 tablet (10 mg total) by mouth once daily. 90 tablet 2    atorvastatin (LIPITOR) 10 MG tablet Take 1 tablet (10 mg total) by mouth every evening. 90 tablet 3    mirabegron (MYRBETRIQ) 25 mg Tb24 ER tablet Take 25 mg by mouth once daily.      moxifloxacin (VIGAMOX) 0.5 % ophthalmic solution 1 drop 3 (three) times daily.      ofloxacin (OCUFLOX) 0.3 % ophthalmic solution Place 1 drop into the left eye 4 (four) times daily.      pantoprazole (PROTONIX) 40 MG tablet TAKE 1 TABLET BY MOUTH EVERY DAY 30 tablet 11    potassium chloride SA (K-DUR,KLOR-CON) 20 MEQ tablet Take 1 tablet (20 mEq total) by mouth once daily. 90 tablet 1    tadalafiL (CIALIS) 5 MG tablet Take 1 tablet (5 mg total) by mouth daily as needed for Erectile Dysfunction. 30 tablet 0    tamsulosin (FLOMAX) 0.4 mg Cap Take 1 capsule (0.4 mg total) by mouth once daily. 90 capsule 3    [DISCONTINUED] pregabalin (LYRICA) 100 MG capsule Take 1 capsule (100 mg total) by mouth 2 (two) times daily. 60 capsule 3    [DISCONTINUED] meloxicam (MOBIC) 7.5 MG tablet Take 1 tablet (7.5 mg total) by mouth daily as needed for Pain. (Patient not taking: Reported on 4/17/2025) 30 tablet 0     Current Facility-Administered Medications on File Prior to Visit   Medication Dose Route Frequency Provider Last Rate Last Admin    lactated ringers infusion   Intravenous Continuous Deanna Gaona MD 10 mL/hr at 10/30/23 0808 New Bag at 10/30/23 0808    LIDOcaine (PF) 10 mg/ml (1%) injection 10 mg  1 mL Intradermal Once Marina De La Torre FNP        sodium chloride 0.9% flush 10 mL  10 mL Intravenous PRN Chaitanya Cramer MD           Labs:     Results for orders placed or performed in visit on 02/17/25   POCT URINE DIPSTICK WITH MICROSCOPE, AUTOMATED    Collection Time: 02/17/25  8:16 AM   Result Value Ref Range     Color, UA Leatha     Spec Grav UA >=1.030     pH, UA 5.5     WBC, UA neg     Nitrite, UA neg     Protein,      Glucose, UA neg     Ketones, UA trace     Urobilinogen, UA 0.2     Bilirubin, POC neg     Blood, UA small    Cytology, Urine    Collection Time: 02/17/25 11:34 AM   Result Value Ref Range    Case Report       [facility]                       Case: [id number]                                 Authorizing Provider:  Chaitanya Rodriguez NP           Collected:           02/17/2025 11:34 AM          Ordering Location:     [facility] -       Received:            02/17/2025 12:18 PM                                 Urology                                                                      Pathologist:           Yolande Perkins MD                                                        Specimen:    Urine, Clean Catch                                                                         Clinical Information       Microscopic urinalysis      Final Diagnosis         Urine, clean catch:  - Negative for malignancy.          Gross Description       1. Urine, Clean Catch:   Received fresh is 40 ml of clear yellow fluid submitted for cytospin preparation.       Specimen 1 Interpretation Negative for high grade urothelial carcinoma       Studies:      - MRI C-spine 2/29/2024: mild degenerative barrowing of spinal canal at C3-5 and C6-7; multilevel neural foraminal stenosis noted    - MRI T-spine 2/29/2024: no definite convincing cord signal abnormality; no significant spinal canal or neural foraminal stenosis    - MRI L-spine w/out Kyree 8/28/2023 - multilevel DDD w/facet degenerative changes w/multilevel canal and foraminal stenosis    - CT L-spine w/out Kyree 7/14/2023 - multilevel degenerative changes    - NCS/EMG bilateral LE 8/12/2022 by Dr. Marck London:  I have reviewed the study independently and with the patient. Incomplete study.  Also, no reference range.  No conduction velocity.   Latency and amplitude of SNAP and CMAP mostly symmetric without reference range.  No F-waves.     Review of Systems:     Review of Systems   All other systems reviewed and are negative.  As per HPI    Physical Exams:     Vitals:    04/17/25 0901   BP: 117/72   Pulse: 66   Resp: 16   Temp: 98.7 °F (37.1 °C)     Physical Exam  Vitals and nursing note reviewed.   Constitutional:       Appearance: Normal appearance.   HENT:      Head: Normocephalic and atraumatic.      Right Ear: External ear normal.      Left Ear: External ear normal.      Nose: Nose normal.      Mouth/Throat:      Mouth: Mucous membranes are moist.      Pharynx: Oropharynx is clear.   Eyes:      Conjunctiva/sclera: Conjunctivae normal.   Cardiovascular:      Rate and Rhythm: Normal rate and regular rhythm.      Pulses: Normal pulses.   Pulmonary:      Effort: Pulmonary effort is normal.   Abdominal:      General: There is no distension.      Tenderness: There is no guarding.   Musculoskeletal:         General: Normal range of motion.      Cervical back: Normal range of motion.      Comments: Painful lower back   Skin:     General: Skin is warm and dry.      Coloration: Skin is not jaundiced.      Findings: No lesion or rash.   Neurological:      Mental Status: He is alert.   Psychiatric:         Mood and Affect: Mood normal.     Comprehensive Neurological Exam:  Mental Status: Alert Oriented to Self, Date, and Place. Comprehension wnl. No dysarthria.   CN II - XII: DUARTE, No APD, VFFC, No ptosis OU, EOMI without nystagmus, LT/Temp symmetric in CN V1-3 distribution, Hearing grossly intact, Face Symmetric, Tongue and Uvula midline, Trapezius symmetric bilateral.   Motor: tone and bulk wnl throughout, no abnormal involuntary or voluntary movements, 5/5 to confrontation, Fine finger movements wnl b/l, No pronator drift.   Sensory: LT, Proprioception, Vibration, PP, Temp symmetric subjectively decreased in bilateral LE throughout.   Reflexes: 2+ throughout  except for 1+ in bilateral AJ, plantar reflexes downward bilateral   Cerebellar: FNF wnl b/l  Romberg: Negative; sway to L  Gait: antalgic gait, stooped posture, utilizing cane    Assessment:     This is 64 y.o.  male with hx of chronic lower back pain, alcohol dependence, HTN, HLD, BPH, hypokalemia, ED, anemia, H. Pylori, transaminitis, hx of colonic polyps who was referred for bilateral lower extremity numbness. NCS/EMG results were inconclusive due to missing components based on report. Exam and history more consistent with lumbar radiculopathy. Neuropathy improved on Lyrica 100mg PO BID. Evaluated by Dr. Elaine 6/26/2024 who discussed surgical option, did not wish to proceed w/surgery. Has been evaluated by pain management, Dr. Schwartz on 10/24/2024 who recommended MBB/RFA. Did not wish to proceed. Admits to one fall since last visit as he slipped while ambulating. Did not require medical attention. Tries to perform chair exercises daily.    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 All questions are answered to the best of my knowledge.     Face to face time 30 minutes, including documentation, chart review, counseling, education, review of test results, relevant medical records, and coordination of care.     AMPARO ReidC  General  Neurology  04/17/2025

## 2025-06-14 DIAGNOSIS — N40.1 BENIGN PROSTATIC HYPERPLASIA WITH WEAK URINARY STREAM: ICD-10-CM

## 2025-06-14 DIAGNOSIS — R39.12 BENIGN PROSTATIC HYPERPLASIA WITH WEAK URINARY STREAM: ICD-10-CM

## 2025-06-16 RX ORDER — TAMSULOSIN HYDROCHLORIDE 0.4 MG/1
1 CAPSULE ORAL
Qty: 90 CAPSULE | Refills: 3 | Status: SHIPPED | OUTPATIENT
Start: 2025-06-16

## 2025-06-19 ENCOUNTER — HOSPITAL ENCOUNTER (OUTPATIENT)
Facility: HOSPITAL | Age: 65
Discharge: LEFT BEFORE DISCHARGE | End: 2025-06-21
Attending: EMERGENCY MEDICINE | Admitting: INTERNAL MEDICINE
Payer: MEDICARE

## 2025-06-19 DIAGNOSIS — R11.2 NAUSEA AND VOMITING, UNSPECIFIED VOMITING TYPE: ICD-10-CM

## 2025-06-19 DIAGNOSIS — R07.9 CHEST PAIN: ICD-10-CM

## 2025-06-19 DIAGNOSIS — K85.00 IDIOPATHIC ACUTE PANCREATITIS WITHOUT INFECTION OR NECROSIS: ICD-10-CM

## 2025-06-19 DIAGNOSIS — R00.0 TACHYCARDIA: ICD-10-CM

## 2025-06-19 DIAGNOSIS — E87.20 METABOLIC ACIDOSIS: Primary | ICD-10-CM

## 2025-06-19 DIAGNOSIS — F10.90 ALCOHOL USE: ICD-10-CM

## 2025-06-19 DIAGNOSIS — K85.90 PANCREATITIS: ICD-10-CM

## 2025-06-19 DIAGNOSIS — K59.00 CONSTIPATION: ICD-10-CM

## 2025-06-19 LAB
A-ADO2 BLOOD GAS (OHS): 15 MMHG
ACCEPTIBLE SP GR UR QL: 1.02 (ref 1–1.03)
ALBUMIN SERPL-MCNC: 3.3 G/DL (ref 3.4–4.8)
ALBUMIN SERPL-MCNC: 4.2 G/DL (ref 3.4–4.8)
ALBUMIN/GLOB SERPL: 0.7 RATIO (ref 1.1–2)
ALBUMIN/GLOB SERPL: 0.8 RATIO (ref 1.1–2)
ALLENS TEST BLOOD GAS (OHS): YES
ALP SERPL-CCNC: 67 UNIT/L (ref 40–150)
ALP SERPL-CCNC: 89 UNIT/L (ref 40–150)
ALT SERPL-CCNC: 16 UNIT/L (ref 0–55)
ALT SERPL-CCNC: 23 UNIT/L (ref 0–55)
AMPHET UR QL SCN: NEGATIVE
ANION GAP SERPL CALC-SCNC: 17 MEQ/L
ANION GAP SERPL CALC-SCNC: 30 MEQ/L
AST SERPL-CCNC: 43 UNIT/L (ref 11–45)
AST SERPL-CCNC: 61 UNIT/L (ref 11–45)
B-OH-BUTYR SERPL-MCNC: 9.9 MMOL/L
BACTERIA #/AREA URNS AUTO: ABNORMAL /HPF
BARBITURATE SCN PRESENT UR: NEGATIVE
BASE EXCESS BLD CALC-SCNC: 3.1 MMOL/L (ref -2–2)
BASOPHILS # BLD AUTO: 0.02 X10(3)/MCL
BASOPHILS # BLD AUTO: 0.02 X10(3)/MCL
BASOPHILS NFR BLD AUTO: 0.3 %
BASOPHILS NFR BLD AUTO: 0.5 %
BENZODIAZ UR QL SCN: NEGATIVE
BILIRUB SERPL-MCNC: 1.2 MG/DL
BILIRUB SERPL-MCNC: 1.3 MG/DL
BILIRUB UR QL STRIP.AUTO: ABNORMAL
BLOOD GAS SAMPLE TYPE (OHS): ABNORMAL
BUN SERPL-MCNC: 7 MG/DL (ref 8.4–25.7)
BUN SERPL-MCNC: 8.4 MG/DL (ref 8.4–25.7)
CALCIUM SERPL-MCNC: 8.6 MG/DL (ref 8.8–10)
CALCIUM SERPL-MCNC: 9.5 MG/DL (ref 8.8–10)
CANNABINOIDS UR QL SCN: NEGATIVE
CHLORIDE SERPL-SCNC: 97 MMOL/L (ref 98–107)
CHLORIDE SERPL-SCNC: 99 MMOL/L (ref 98–107)
CHLORIDE UR-SCNC: <20 MMOL/L
CK SERPL-CCNC: 129 U/L (ref 30–200)
CLARITY UR: ABNORMAL
CO2 BLDA-SCNC: 25.4 MMOL/L (ref 22–26)
CO2 SERPL-SCNC: 11 MMOL/L (ref 23–31)
CO2 SERPL-SCNC: 20 MMOL/L (ref 23–31)
COCAINE UR QL SCN: NEGATIVE
COHGB MFR BLDA: 2.1 % (ref 0.5–1.5)
COLOR UR AUTO: YELLOW
CREAT SERPL-MCNC: 1.18 MG/DL (ref 0.72–1.25)
CREAT SERPL-MCNC: 1.6 MG/DL (ref 0.72–1.25)
CREAT UR-MCNC: 214.7 MG/DL (ref 63–166)
CREAT/UREA NIT SERPL: 5
CREAT/UREA NIT SERPL: 6
DRAWN BY BLOOD GAS (OHS): ABNORMAL
EOSINOPHIL # BLD AUTO: 0.01 X10(3)/MCL (ref 0–0.9)
EOSINOPHIL # BLD AUTO: 0.02 X10(3)/MCL (ref 0–0.9)
EOSINOPHIL NFR BLD AUTO: 0.1 %
EOSINOPHIL NFR BLD AUTO: 0.5 %
ERYTHROCYTE [DISTWIDTH] IN BLOOD BY AUTOMATED COUNT: 12.7 % (ref 11.5–17)
ERYTHROCYTE [DISTWIDTH] IN BLOOD BY AUTOMATED COUNT: 13.2 % (ref 11.5–17)
ETHANOL SERPL-MCNC: <10 MG/DL
FENTANYL UR QL SCN: POSITIVE
GAS PNL BLD: 115 MMHG
GFR SERPLBLD CREATININE-BSD FMLA CKD-EPI: 48 ML/MIN/1.73/M2
GFR SERPLBLD CREATININE-BSD FMLA CKD-EPI: >60 ML/MIN/1.73/M2
GGT SERPL-CCNC: 178 U/L (ref 12–64)
GLOBULIN SER-MCNC: 4.4 GM/DL (ref 2.4–3.5)
GLOBULIN SER-MCNC: 5.8 GM/DL (ref 2.4–3.5)
GLUCOSE SERPL-MCNC: 122 MG/DL (ref 82–115)
GLUCOSE SERPL-MCNC: 126 MG/DL (ref 82–115)
GLUCOSE UR QL STRIP: NORMAL
HCO3 BLDA-SCNC: 24.5 MMOL/L (ref 22–26)
HCT VFR BLD AUTO: 36.4 % (ref 42–52)
HCT VFR BLD AUTO: 45.3 % (ref 42–52)
HGB BLD-MCNC: 13.3 G/DL (ref 14–18)
HGB BLD-MCNC: 15.8 G/DL (ref 14–18)
HGB UR QL STRIP: ABNORMAL
HOLD SPECIMEN: NORMAL
HYALINE CASTS #/AREA URNS LPF: >20 /LPF
IMM GRANULOCYTES # BLD AUTO: 0.01 X10(3)/MCL (ref 0–0.04)
IMM GRANULOCYTES # BLD AUTO: 0.04 X10(3)/MCL (ref 0–0.04)
IMM GRANULOCYTES NFR BLD AUTO: 0.2 %
IMM GRANULOCYTES NFR BLD AUTO: 0.5 %
INHALED O2 CONCENTRATION: 21 %
INR PPP: 1
KETONES UR QL STRIP: ABNORMAL
LACTATE SERPL-SCNC: 1.3 MMOL/L (ref 0.5–2.2)
LEUKOCYTE ESTERASE UR QL STRIP: NEGATIVE
LIPASE SERPL-CCNC: 158 U/L
LYMPHOCYTES # BLD AUTO: 0.4 X10(3)/MCL (ref 0.6–4.6)
LYMPHOCYTES # BLD AUTO: 0.88 X10(3)/MCL (ref 0.6–4.6)
LYMPHOCYTES NFR BLD AUTO: 20.4 %
LYMPHOCYTES NFR BLD AUTO: 5.3 %
MAGNESIUM SERPL-MCNC: 1.8 MG/DL (ref 1.6–2.6)
MAGNESIUM SERPL-MCNC: 2 MG/DL (ref 1.6–2.6)
MCH RBC QN AUTO: 30.4 PG (ref 27–31)
MCH RBC QN AUTO: 30.7 PG (ref 27–31)
MCHC RBC AUTO-ENTMCNC: 34.9 G/DL (ref 33–36)
MCHC RBC AUTO-ENTMCNC: 36.5 G/DL (ref 33–36)
MCV RBC AUTO: 84.1 FL (ref 80–94)
MCV RBC AUTO: 87.1 FL (ref 80–94)
MDMA UR QL SCN: NEGATIVE
METHGB MFR BLDA: 1.2 % (ref 0–1.5)
MONOCYTES # BLD AUTO: 0.48 X10(3)/MCL (ref 0.1–1.3)
MONOCYTES # BLD AUTO: 0.54 X10(3)/MCL (ref 0.1–1.3)
MONOCYTES NFR BLD AUTO: 11.1 %
MONOCYTES NFR BLD AUTO: 7.2 %
MUCOUS THREADS URNS QL MICRO: ABNORMAL /LPF
NEUTROPHILS # BLD AUTO: 2.91 X10(3)/MCL (ref 2.1–9.2)
NEUTROPHILS # BLD AUTO: 6.47 X10(3)/MCL (ref 2.1–9.2)
NEUTROPHILS NFR BLD AUTO: 67.3 %
NEUTROPHILS NFR BLD AUTO: 86.6 %
NITRITE UR QL STRIP: NEGATIVE
NRBC BLD AUTO-RTO: 0 %
NRBC BLD AUTO-RTO: 0 %
O2 HB BLOOD GAS (OHS): 96 % (ref 94–100)
OHS QRS DURATION: 86 MS
OHS QTC CALCULATION: 458 MS
OPIATES UR QL SCN: NEGATIVE
OSMOLALITY SERPL: 310 MOSM/KG (ref 280–300)
OSMOLALITY UR: 546 MOSM/KG (ref 300–1300)
OXYGEN DEVICE BLOOD GAS (OHS): ABNORMAL
OXYHGB MFR BLDA: 13.7 G/DL (ref 12–18)
PCO2 BLDA: 28 MMHG (ref 35–45)
PCP UR QL: NEGATIVE
PH BLDA: 7.55 [PH] (ref 7.35–7.45)
PH UR STRIP: 6 [PH]
PH UR: 6 [PH] (ref 3–11)
PHOSPHATE SERPL-MCNC: 0.7 MG/DL (ref 2.3–4.7)
PLATELET # BLD AUTO: 77 X10(3)/MCL (ref 130–400)
PLATELET # BLD AUTO: 93 X10(3)/MCL (ref 130–400)
PMV BLD AUTO: 12.7 FL (ref 7.4–10.4)
PMV BLD AUTO: 12.8 FL (ref 7.4–10.4)
PO2 BLDA: 100 MMHG (ref 75–100)
POTASSIUM SERPL-SCNC: 2.7 MMOL/L (ref 3.5–5.1)
POTASSIUM SERPL-SCNC: 3.5 MMOL/L (ref 3.5–5.1)
POTASSIUM UR-SCNC: 81.9 MMOL/L
PROT SERPL-MCNC: 10 GM/DL (ref 5.8–7.6)
PROT SERPL-MCNC: 7.7 GM/DL (ref 5.8–7.6)
PROT UR QL STRIP: ABNORMAL
PROT UR STRIP-MCNC: 255.2 MG/DL
PROTHROMBIN TIME: 13.5 SECONDS (ref 11.4–14)
RBC # BLD AUTO: 4.33 X10(6)/MCL (ref 4.7–6.1)
RBC # BLD AUTO: 5.2 X10(6)/MCL (ref 4.7–6.1)
RBC #/AREA URNS AUTO: ABNORMAL /HPF
SAMPLE SITE BLOOD GAS (OHS): ABNORMAL
SAO2 % BLDA: 99.3 %
SODIUM SERPL-SCNC: 136 MMOL/L (ref 136–145)
SODIUM SERPL-SCNC: 138 MMOL/L (ref 136–145)
SODIUM UR-SCNC: 61 MMOL/L
SP GR UR STRIP.AUTO: 1.02 (ref 1–1.03)
SQUAMOUS #/AREA URNS LPF: ABNORMAL /HPF
URINE PROTEIN/CREATININE RATIO (OLG): 1.2
UROBILINOGEN UR STRIP-ACNC: ABNORMAL
WBC # BLD AUTO: 4.32 X10(3)/MCL (ref 4.5–11.5)
WBC # BLD AUTO: 7.48 X10(3)/MCL (ref 4.5–11.5)
WBC #/AREA URNS AUTO: ABNORMAL /HPF

## 2025-06-19 PROCEDURE — 85025 COMPLETE CBC W/AUTO DIFF WBC: CPT | Mod: 91

## 2025-06-19 PROCEDURE — 82010 KETONE BODYS QUAN: CPT

## 2025-06-19 PROCEDURE — G0378 HOSPITAL OBSERVATION PER HR: HCPCS

## 2025-06-19 PROCEDURE — 63600175 PHARM REV CODE 636 W HCPCS

## 2025-06-19 PROCEDURE — 96361 HYDRATE IV INFUSION ADD-ON: CPT

## 2025-06-19 PROCEDURE — 80053 COMPREHEN METABOLIC PANEL: CPT | Mod: 91

## 2025-06-19 PROCEDURE — 84156 ASSAY OF PROTEIN URINE: CPT

## 2025-06-19 PROCEDURE — 96366 THER/PROPH/DIAG IV INF ADDON: CPT

## 2025-06-19 PROCEDURE — 84300 ASSAY OF URINE SODIUM: CPT

## 2025-06-19 PROCEDURE — 36415 COLL VENOUS BLD VENIPUNCTURE: CPT

## 2025-06-19 PROCEDURE — 85610 PROTHROMBIN TIME: CPT

## 2025-06-19 PROCEDURE — 25000003 PHARM REV CODE 250: Performed by: INTERNAL MEDICINE

## 2025-06-19 PROCEDURE — 82436 ASSAY OF URINE CHLORIDE: CPT

## 2025-06-19 PROCEDURE — 84100 ASSAY OF PHOSPHORUS: CPT

## 2025-06-19 PROCEDURE — 25000003 PHARM REV CODE 250

## 2025-06-19 PROCEDURE — 96365 THER/PROPH/DIAG IV INF INIT: CPT

## 2025-06-19 PROCEDURE — 97162 PT EVAL MOD COMPLEX 30 MIN: CPT

## 2025-06-19 PROCEDURE — 83935 ASSAY OF URINE OSMOLALITY: CPT

## 2025-06-19 PROCEDURE — 96365 THER/PROPH/DIAG IV INF INIT: CPT | Mod: 59

## 2025-06-19 PROCEDURE — 99900035 HC TECH TIME PER 15 MIN (STAT)

## 2025-06-19 PROCEDURE — 99285 EMERGENCY DEPT VISIT HI MDM: CPT | Mod: 25

## 2025-06-19 PROCEDURE — 84133 ASSAY OF URINE POTASSIUM: CPT

## 2025-06-19 PROCEDURE — 82550 ASSAY OF CK (CPK): CPT | Performed by: INTERNAL MEDICINE

## 2025-06-19 PROCEDURE — 83605 ASSAY OF LACTIC ACID: CPT

## 2025-06-19 PROCEDURE — 83690 ASSAY OF LIPASE: CPT

## 2025-06-19 PROCEDURE — 83930 ASSAY OF BLOOD OSMOLALITY: CPT

## 2025-06-19 PROCEDURE — 83735 ASSAY OF MAGNESIUM: CPT | Mod: 91

## 2025-06-19 PROCEDURE — 80053 COMPREHEN METABOLIC PANEL: CPT

## 2025-06-19 PROCEDURE — 80307 DRUG TEST PRSMV CHEM ANLYZR: CPT

## 2025-06-19 PROCEDURE — A4217 STERILE WATER/SALINE, 500 ML: HCPCS

## 2025-06-19 PROCEDURE — 25500020 PHARM REV CODE 255: Performed by: EMERGENCY MEDICINE

## 2025-06-19 PROCEDURE — 82803 BLOOD GASES ANY COMBINATION: CPT

## 2025-06-19 PROCEDURE — 81001 URINALYSIS AUTO W/SCOPE: CPT | Mod: XB

## 2025-06-19 PROCEDURE — 36600 WITHDRAWAL OF ARTERIAL BLOOD: CPT

## 2025-06-19 PROCEDURE — 83735 ASSAY OF MAGNESIUM: CPT

## 2025-06-19 PROCEDURE — 87040 BLOOD CULTURE FOR BACTERIA: CPT | Mod: 91

## 2025-06-19 PROCEDURE — 96375 TX/PRO/DX INJ NEW DRUG ADDON: CPT

## 2025-06-19 PROCEDURE — 85025 COMPLETE CBC W/AUTO DIFF WBC: CPT

## 2025-06-19 PROCEDURE — 93005 ELECTROCARDIOGRAM TRACING: CPT

## 2025-06-19 PROCEDURE — 82977 ASSAY OF GGT: CPT | Performed by: INTERNAL MEDICINE

## 2025-06-19 PROCEDURE — 82077 ASSAY SPEC XCP UR&BREATH IA: CPT

## 2025-06-19 RX ORDER — PROCHLORPERAZINE EDISYLATE 5 MG/ML
5 INJECTION INTRAMUSCULAR; INTRAVENOUS
Status: COMPLETED | OUTPATIENT
Start: 2025-06-19 | End: 2025-06-19

## 2025-06-19 RX ORDER — SODIUM CHLORIDE, SODIUM LACTATE, POTASSIUM CHLORIDE, CALCIUM CHLORIDE 600; 310; 30; 20 MG/100ML; MG/100ML; MG/100ML; MG/100ML
INJECTION, SOLUTION INTRAVENOUS CONTINUOUS
Status: DISCONTINUED | OUTPATIENT
Start: 2025-06-19 | End: 2025-06-21

## 2025-06-19 RX ORDER — POTASSIUM CHLORIDE 20 MEQ/1
60 TABLET, EXTENDED RELEASE ORAL ONCE
Status: COMPLETED | OUTPATIENT
Start: 2025-06-19 | End: 2025-06-19

## 2025-06-19 RX ORDER — NALOXONE HCL 0.4 MG/ML
0.02 VIAL (ML) INJECTION
Status: DISCONTINUED | OUTPATIENT
Start: 2025-06-19 | End: 2025-06-21 | Stop reason: HOSPADM

## 2025-06-19 RX ORDER — ONDANSETRON 4 MG/1
4 TABLET, ORALLY DISINTEGRATING ORAL
Status: COMPLETED | OUTPATIENT
Start: 2025-06-19 | End: 2025-06-19

## 2025-06-19 RX ORDER — ONDANSETRON 4 MG/1
8 TABLET, ORALLY DISINTEGRATING ORAL EVERY 8 HOURS PRN
Status: DISCONTINUED | OUTPATIENT
Start: 2025-06-19 | End: 2025-06-21 | Stop reason: HOSPADM

## 2025-06-19 RX ORDER — GLUCAGON 1 MG
1 KIT INJECTION
Status: DISCONTINUED | OUTPATIENT
Start: 2025-06-19 | End: 2025-06-21 | Stop reason: HOSPADM

## 2025-06-19 RX ORDER — DIAZEPAM 10 MG/2ML
5 INJECTION INTRAMUSCULAR EVERY 4 HOURS PRN
Status: DISCONTINUED | OUTPATIENT
Start: 2025-06-19 | End: 2025-06-21 | Stop reason: HOSPADM

## 2025-06-19 RX ORDER — PANTOPRAZOLE SODIUM 40 MG/1
40 TABLET, DELAYED RELEASE ORAL 2 TIMES DAILY
Status: DISCONTINUED | OUTPATIENT
Start: 2025-06-19 | End: 2025-06-21 | Stop reason: HOSPADM

## 2025-06-19 RX ORDER — ATORVASTATIN CALCIUM 10 MG/1
10 TABLET, FILM COATED ORAL DAILY
Status: DISCONTINUED | OUTPATIENT
Start: 2025-06-20 | End: 2025-06-21 | Stop reason: HOSPADM

## 2025-06-19 RX ORDER — IBUPROFEN 200 MG
16 TABLET ORAL
Status: DISCONTINUED | OUTPATIENT
Start: 2025-06-19 | End: 2025-06-21 | Stop reason: HOSPADM

## 2025-06-19 RX ORDER — PROMETHAZINE HYDROCHLORIDE 25 MG/1
25 TABLET ORAL EVERY 6 HOURS PRN
Status: DISCONTINUED | OUTPATIENT
Start: 2025-06-19 | End: 2025-06-19

## 2025-06-19 RX ORDER — AMLODIPINE BESYLATE 10 MG/1
10 TABLET ORAL DAILY
Status: DISCONTINUED | OUTPATIENT
Start: 2025-06-20 | End: 2025-06-21 | Stop reason: HOSPADM

## 2025-06-19 RX ORDER — ALUMINUM HYDROXIDE, MAGNESIUM HYDROXIDE, AND SIMETHICONE 1200; 120; 1200 MG/30ML; MG/30ML; MG/30ML
30 SUSPENSION ORAL 4 TIMES DAILY PRN
Status: DISCONTINUED | OUTPATIENT
Start: 2025-06-19 | End: 2025-06-21 | Stop reason: HOSPADM

## 2025-06-19 RX ORDER — SODIUM CHLORIDE 0.9 % (FLUSH) 0.9 %
10 SYRINGE (ML) INJECTION EVERY 12 HOURS PRN
Status: DISCONTINUED | OUTPATIENT
Start: 2025-06-19 | End: 2025-06-21 | Stop reason: HOSPADM

## 2025-06-19 RX ORDER — LABETALOL HCL 20 MG/4 ML
10 SYRINGE (ML) INTRAVENOUS EVERY 4 HOURS PRN
Status: DISCONTINUED | OUTPATIENT
Start: 2025-06-19 | End: 2025-06-21 | Stop reason: HOSPADM

## 2025-06-19 RX ORDER — PROCHLORPERAZINE MALEATE 10 MG
10 TABLET ORAL 3 TIMES DAILY PRN
Status: DISCONTINUED | OUTPATIENT
Start: 2025-06-19 | End: 2025-06-21 | Stop reason: HOSPADM

## 2025-06-19 RX ORDER — FOLIC ACID 1 MG/1
1 TABLET ORAL DAILY
Status: DISCONTINUED | OUTPATIENT
Start: 2025-06-19 | End: 2025-06-21 | Stop reason: HOSPADM

## 2025-06-19 RX ORDER — THIAMINE HCL 100 MG
100 TABLET ORAL DAILY
Status: DISCONTINUED | OUTPATIENT
Start: 2025-06-19 | End: 2025-06-21 | Stop reason: HOSPADM

## 2025-06-19 RX ORDER — IBUPROFEN 200 MG
24 TABLET ORAL
Status: DISCONTINUED | OUTPATIENT
Start: 2025-06-19 | End: 2025-06-21 | Stop reason: HOSPADM

## 2025-06-19 RX ORDER — GABAPENTIN 100 MG/1
100 CAPSULE ORAL 3 TIMES DAILY
Status: DISCONTINUED | OUTPATIENT
Start: 2025-06-19 | End: 2025-06-21 | Stop reason: HOSPADM

## 2025-06-19 RX ORDER — SODIUM,POTASSIUM PHOSPHATES 280-250MG
2 POWDER IN PACKET (EA) ORAL ONCE
Status: COMPLETED | OUTPATIENT
Start: 2025-06-19 | End: 2025-06-19

## 2025-06-19 RX ADMIN — Medication 100 MG: at 02:06

## 2025-06-19 RX ADMIN — GABAPENTIN 100 MG: 100 CAPSULE ORAL at 08:06

## 2025-06-19 RX ADMIN — FOLIC ACID 1 MG: 1 TABLET ORAL at 02:06

## 2025-06-19 RX ADMIN — PROCHLORPERAZINE EDISYLATE 5 MG: 5 INJECTION INTRAMUSCULAR; INTRAVENOUS at 11:06

## 2025-06-19 RX ADMIN — POTASSIUM CHLORIDE 60 MEQ: 1500 TABLET, EXTENDED RELEASE ORAL at 08:06

## 2025-06-19 RX ADMIN — PANTOPRAZOLE SODIUM 40 MG: 40 TABLET, DELAYED RELEASE ORAL at 08:06

## 2025-06-19 RX ADMIN — THERA TABS 1 TABLET: TAB at 02:06

## 2025-06-19 RX ADMIN — SODIUM CHLORIDE, POTASSIUM CHLORIDE, SODIUM LACTATE AND CALCIUM CHLORIDE 1000 ML: 600; 310; 30; 20 INJECTION, SOLUTION INTRAVENOUS at 10:06

## 2025-06-19 RX ADMIN — ONDANSETRON 4 MG: 4 TABLET, ORALLY DISINTEGRATING ORAL at 09:06

## 2025-06-19 RX ADMIN — FOLIC ACID: 5 INJECTION, SOLUTION INTRAMUSCULAR; INTRAVENOUS; SUBCUTANEOUS at 11:06

## 2025-06-19 RX ADMIN — WATER: 1000 INJECTION, SOLUTION INTRAVENOUS at 02:06

## 2025-06-19 RX ADMIN — IOHEXOL 100 ML: 350 INJECTION, SOLUTION INTRAVENOUS at 11:06

## 2025-06-19 RX ADMIN — POTASSIUM & SODIUM PHOSPHATES POWDER PACK 280-160-250 MG 2 PACKET: 280-160-250 PACK at 09:06

## 2025-06-19 RX ADMIN — GABAPENTIN 100 MG: 100 CAPSULE ORAL at 02:06

## 2025-06-19 RX ADMIN — SODIUM CHLORIDE, POTASSIUM CHLORIDE, SODIUM LACTATE AND CALCIUM CHLORIDE: 600; 310; 30; 20 INJECTION, SOLUTION INTRAVENOUS at 08:06

## 2025-06-19 NOTE — PLAN OF CARE
06/19/25 1519   Discharge Assessment   Assessment Type Discharge Planning Assessment   Confirmed/corrected address, phone number and insurance Yes   Confirmed Demographics Correct on Facesheet   Source of Information patient   People in Home friend(s)   Name(s) of People in Home Genevieve Thibodeaux (Roommate)  [phone]; Anuradha Candelaria ([phone])   Facility Arrived From: Home   Do you expect to return to your current living situation? Yes   Do you have help at home or someone to help you manage your care at home? Yes   Who are your caregiver(s) and their phone number(s)? Genevieve Thibodeaux (Roommate)  [phone]; Anuradha Candelaria ([phone])   Prior to hospitilization cognitive status: Alert/Oriented   Current cognitive status: Alert/Oriented   Walking or Climbing Stairs Difficulty yes   Walking or Climbing Stairs ambulation difficulty, requires equipment   Mobility Management Quad cane   Dressing/Bathing Difficulty no   Home Layout Able to live on 1st floor   Equipment Currently Used at Home cane, quad   Readmission within 30 days? No   Patient currently being followed by outpatient case management? No   Do you currently have service(s) that help you manage your care at home? No   Do you take prescription medications? Yes   Do you have prescription coverage? Yes   Do you have any problems affording any of your prescribed medications? No   Who is going to help you get home at discharge? Friend   How do you get to doctors appointments? car, drives self   Are you on dialysis? No   Discharge Plan A Home   DME Needed Upon Discharge  none   Discharge Plan discussed with: Patient   Transition of Care Barriers None     Pt single with no children; Resides with roommateGenevieve; Anuradha Candelaria, is alternate contact; Pt independent with ADL's; Receives Disability & SNAP benefits; CM to follow.

## 2025-06-19 NOTE — PT/OT/SLP EVAL
Physical Therapy Evaluation    Patient Name:  Arcadio Scott   MRN:  [id number]    Recommendations:     Therapy Intensity Recommendations at Discharge: no therapy indicated  Discharge Equipment Recommendations:  TBD - rolling walker vs quad cane (patient has quad cane)  Equipment to be obtained for discharge: TBD pending progress - possible rolling walker  Barriers to discharge: fall risk and decreased endurance    Assessment:     Arcadio Scott is a 64 y.o. male admitted with a medical diagnosis of:  1. Metabolic acidosis    2. Constipation    3. Pancreatitis    4. Tachycardia    5. Chest pain       Problem List[1]   He presents with the following impairments/functional limitations:  impaired endurance, gait instability, impaired balance, decreased lower extremity function, weakness.    Rehab Prognosis: Good.    Patient would benefit from continued skilled acute PT services to: address above listed impairments/functional limitations; receive patient/caregiver education; reduce fall risk; and maximize independency/safety with functional mobility.    -continued: ambulation, with progression of gait distance/frequency/duration and speed, as tolerated/appropriate, with assistance and supervision     Recent Surgery: * No surgery found *      Plan:     During this hospitalization, patient to be seen 5 x/week to address the identified impairments/functional limitations via gait training, therapeutic exercises and progress toward the established goals.    Plan of Care Expires:  07/17/25    Subjective     Communicated with patient's nurse Joesph prior to session.    Patient agreeable to participate in evaluation.     Chief Complaint: back pain w/ standing for prolonged periods  Patient/Family Comments/goals: home  Pain/Comfort:  Pain Rating 1: 0/10  Location 1:  (back discomfort if stand for prolonged times (i.e. cooking, dishes at sink))  Pain Addressed 1: Nurse notified  Pain Rating Post-Intervention 1: 0/10    Patients  cultural, spiritual, Druze conflicts given the current situation: no    Social History  Living Environment: Patient lives with friend in a single level home, with 5 steps steps outside, with bilat handrails, with tub-shower combo.  Functional Level: Prior to admission patient was disabled, was driving, was independent in ADL's, was independent in IADL's (able to cook & wash dishes but limited standing time), ambulated with assistive device, patient has zero-turn mower for lawn maintenance.  Equipment Used at Home: cane, quad  Equipment owned (not currently used): none.  Assistance Upon Discharge: friend.    Hand dominance: right    Patient denied lightheadedness/dizziness.  Patient complaints of extremity tingling/numbness distal LE's  Patient denied current swallowing difficulty.  Patient denied 'new' vision impairment.     Objective:     Patient found supine in bed, with HOB elevated, and with bed rails up bilateral HOB and left FOB with peripheral IV upon PT entry to room.    General Precautions: Standard, fall, seizure   Orthopedic Precautions:N/A   Braces: N/A  Respiratory Status: room air  SAT O2 Check: n/a    Exams:  Orientation: Patient is oriented to person, place, time, situation  Commands: Patient follows multi-step verbal commands  Fine Motor Coordination:     -     Intact: bilateral toe taps and bilateral heel taps  Sensation:    -     Intact: light/touch bilat lower extremity  BILAT UE ROM/strength - defer to OT - see OT note for details  RLE ROM: WFL  RLE Strength: WFL  LLE ROM: WFL  LLE Strength: WFL    Functional Mobility:    Bed Mobility:  Rolling Right: modified independence  Scooting: modified independence  Supine to Sit: supervision  Sit to Supine: supervision  with no cues required  with HOB elevated, hand rail, and firm mattress    Transfers:  Sit to Stand: stand by assistance with rolling walker  Stand to Sit: stand by assistance with rolling walker  with no cues  required    Gait:  Patient ambulated 130ft with rolling walker and stand by assistance.  Patient demonstrates :       no loss of balance       no mis-steps       decreased marleen       decreased bilateral step length       decreased bilateral foot/floor clearance       slowed, guarded movements - all transitional activities.    Other Mobility:  N/A    Balance:  Sit  Patient demonstrated static balance on level surface with independence with no verbal cues.  Patient demonstrated dynamic balance on level surface with modified independence with no verbal cues during moderate excursions.  Stand  Patient demonstrated static balance on level surface  using rolling walker with modified independence with no verbal cues.    Additional Treatment Session  N/A    Patient left supine in bed, with HOB elevated, and with bed rails up bilateral HOB and left FOB with peripheral IV with all lines intact, call button in reach, tray table at bedside, and patient's nurse notified.    DME Justifications:  TBD pending progress - currently - No DME recommended requiring DME justifications    Education     Patient educated on the importance of early mobility to prevent functional decline during hospital stay.  Patient educated on and assisted with functional mobility as noted above.  Patient educated on PT Plan of Care and role of PT in acute care.  Patient was instructed to utilize staff assistance for mobility/transfers.  White board updated regarding patient's safest level of mobility with staff assistance.    Goals     Multidisciplinary Problems       Physical Therapy Goals       Problem: Physical Therapy    Goal Priority Disciplines Outcome Interventions   Physical Therapy Goal     PT, PT/OT     Description: ESTABLISHED 06/19/2025  Goals to be met by: DISCHARGE  Patient will increase functional independence with mobility by performing:  -. Supine to sit with [place]  -. Sit to supine with [place]  -. Rolling to Left and  Right with [place]  -. Sit to stand transfer with Modified [place]  -. Gait  x 260 feet with Modified [place] using Rolling Walker vs Quad Cane  -. Ascend/descend 5 steps with bilateral Handrails Modified [place] using Quad Cane           History:     Past Medical History:   Diagnosis Date    GERD (gastroesophageal reflux disease)     Hypertension     Neuropathy     Unspecified glaucoma      Past Surgical History:   Procedure Laterality Date    ANGIOGRAM, CORONARY, WITH LEFT HEART CATHETERIZATION N/A 10/16/2024    Procedure: Angiogram, Coronary, with Left Heart Cath;  Surgeon: Chaitanya Cramer MD;  Location: [facility] CATH LAB;  Service: Cardiology;  Laterality: N/A;    CATARACT EXTRACTION W/  INTRAOCULAR LENS IMPLANT Right 8/13/2024    Procedure: EXTRACTION, CATARACT, WITH IOL INSERT- OD;  Surgeon: Duncan Arevalo MD;  Location: [facility] OR;  Service: Ophthalmology;  Laterality: Right;    CATARACT EXTRACTION W/  INTRAOCULAR LENS IMPLANT Left 9/24/2024    Procedure: EXTRACTION, CATARACT, WITH IOL INSERT- OS;  Surgeon: Duncan Arevalo MD;  Location: [facility] OR;  Service: Ophthalmology;  Laterality: Left;    COLONOSCOPY W/ POLYPECTOMY  01/28/2022    COLONOSCOPY, WITH POLYPECTOMY USING SNARE N/A 10/30/2023    Procedure: COLONOSCOPY, WITH POLYPECTOMY USING SNARE;  Surgeon: Ngoc Fenton MD;  Location: [facility] ENDOSCOPY;  Service: Gastroenterology;  Laterality: N/A;    COLONOSCOPY, WITH POLYPECTOMY USING SNARE N/A 10/30/2024    Procedure: COLONOSCOPY, WITH POLYPECTOMY USING SNARE;  Surgeon: Ngoc Fenton MD;  Location: [facility] ENDOSCOPY;  Service: Gastroenterology;  Laterality: N/A;    CYST REMOVAL      Facial    EGD, WITH CLOSED BIOPSY N/A 10/30/2023    Procedure: EGD;  Surgeon: Ngoc Fenton MD;  Location: [facility] ENDOSCOPY;  Service: Gastroenterology;  Laterality: N/A;    SURGICAL REMOVAL OF LESION OF FACE Bilateral 07/18/2022    Procedure: EXCISION, LESION, FACE AND NECK;  Surgeon:  Marcelo Capps MD;  Location: [facility];  Service: ENT;  Laterality: Bilateral;     Time Tracking:     PT Received On: 06/19/25  PT Start Time: 1505     PT Stop Time: 1533  PT Total Time (min): 28 min     Billable Minutes: Evaluation 28  Non-Billable Minutes: N/A  06/19/2025       [1]   Patient Active Problem List  Diagnosis    Mass of soft tissue of neck    Epidermal cyst of face    Epidermal cyst of neck    Screening for malignant neoplasm of prostate    Chronic bilateral low back pain without sciatica    Polyneuropathy    Peripheral sensory-motor axonal polyneuropathy    Hx of colonic polyps    Nausea and vomiting    Alcohol use    Hypokalemia    Microscopic hematuria    Transaminitis    Hypertension    Intractable vomiting    DDD (degenerative disc disease), lumbar    Reading impairment    Fall    Anemia    H. pylori infection    BPH (benign prostatic hyperplasia)    OAB (overactive bladder)    Nocturia    Spinal stenosis of lumbar region without neurogenic claudication    Erectile dysfunction    Abnormal stress test    Mixed hyperlipidemia    Class 2 severe obesity due to excess calories with serious comorbidity and body mass index (BMI) of 35.0 to 35.9 in adult    Difficulty walking    Falls

## 2025-06-19 NOTE — H&P
[facility] Medicine  History & Physical Note      Patient Name: Arcadio Scott  : 1960  MRN: [id number]  Patient Class: Emergency   Admission Date: 2025   Length of Stay: 0  Admitting Service: Hospital Medicine  Attending Physician: Estiven Peters MD   Resident: Yanet  Intern: James   PCP: No primary care provider on file.  Source of history: Patient, patient's family, and EMR.     Chief Complaint   Emesis and Abdominal Pain (PT W CO ABD PAIN W INTERMITTENT NV X 4 DAYS.  LBM X 5 DAYS. WT LOSS REPORTED > 30 LB. OVER PASS FEW MONTHS. )      History of Present Illness   Arcadio Scott is a 65 yo M w PMHx of Grade II diastolic dysfunction, HTN, HLD, polysubstance abuse, gastritis 2/2 H pylori infection in , chronic polyneuropathy, overactive bladder, and BPH who presents to [facility] ED on 25 with complaints of decreased PO intake 2/2 intractable nausea and vomiting. Patient reports symptoms started roughly 4 days ago however this is an acute on chronic issue since peptic ulcer 2/2 H pylori infection was diagnosed in . Per chart review, repeat fecal antigen test negative in . He cannot hold solid food down but can tolerate liquids. Patient also reports unintentional weight loss of nearly 30lbs in the last 3-4 months as well as non-radiating burning epigastric pain. Denies sick contacts, fevers, chills, hematemesis, melena, hematochezia.     Though patient denied all tobacco and recreational drug use, per chart review patient goes through a quarter to a half a ppd from the last 16 years. Patient denied alcohol dependence but drinks 4 beers and 3 x 8oz glass of vodka on the weekends. He denies daily EtOH use. UDS+ for fentanyl and EtOH level negative prior to this admission.     Upon ED arrival, patient was tachycardic 120s with SBP ranging 130-150s. Orthostatics negative following 1L LR bolus. Lab work consistent with starvation ketoacidosis with anion gap 30, ETTA  Cr/BUN 1.60/8.4, transaminitis AST/ALT 61/23, mild pancreatitis lipase 158. UA consistent with proteinuria, hematuria, pH 6.0 consistent with RTA. XR abd negative for heavy bowel burden and signs of acute obstruction. Abdomen U/S consistent with hepatic steatosis. CTAP with IV contrast not consistent with obstructive hydronephrosis, acute cholecystitis, biliary dilation and pancreatitis. EKG consistent with sinus tachycardia.  Given 1 x 1L LR bolus as above, 1 x banana bag, 1 x compazine 5mg IV, 1 x SL Zofran 4mg.     ROS   Pertinent positive and negative as mentioned in HPI     Past Medical History     Past Medical History:   Diagnosis Date    GERD (gastroesophageal reflux disease)     Hypertension     Neuropathy     Unspecified glaucoma        Past Surgical History     Past Surgical History:   Procedure Laterality Date    ANGIOGRAM, CORONARY, WITH LEFT HEART CATHETERIZATION N/A 10/16/2024    Procedure: Angiogram, Coronary, with Left Heart Cath;  Surgeon: Chaitanya Cramer MD;  Location: [facility] CATH LAB;  Service: Cardiology;  Laterality: N/A;    CATARACT EXTRACTION W/  INTRAOCULAR LENS IMPLANT Right 8/13/2024    Procedure: EXTRACTION, CATARACT, WITH IOL INSERT- OD;  Surgeon: Duncan Arevalo MD;  Location: [facility] OR;  Service: Ophthalmology;  Laterality: Right;    CATARACT EXTRACTION W/  INTRAOCULAR LENS IMPLANT Left 9/24/2024    Procedure: EXTRACTION, CATARACT, WITH IOL INSERT- OS;  Surgeon: Duncan Arevalo MD;  Location: [facility] OR;  Service: Ophthalmology;  Laterality: Left;    COLONOSCOPY W/ POLYPECTOMY  01/28/2022    COLONOSCOPY, WITH POLYPECTOMY USING SNARE N/A 10/30/2023    Procedure: COLONOSCOPY, WITH POLYPECTOMY USING SNARE;  Surgeon: Ngoc Fenton MD;  Location: [facility] ENDOSCOPY;  Service: Gastroenterology;  Laterality: N/A;    COLONOSCOPY, WITH POLYPECTOMY USING SNARE N/A 10/30/2024    Procedure: COLONOSCOPY, WITH POLYPECTOMY USING SNARE;  Surgeon: Ngoc Fenton MD;  Location: [facility]  ENDOSCOPY;  Service: Gastroenterology;  Laterality: N/A;    CYST REMOVAL      Facial    EGD, WITH CLOSED BIOPSY N/A 10/30/2023    Procedure: EGD;  Surgeon: Ngoc Fenton MD;  Location: [facility] ENDOSCOPY;  Service: Gastroenterology;  Laterality: N/A;    SURGICAL REMOVAL OF LESION OF FACE Bilateral 07/18/2022    Procedure: EXCISION, LESION, FACE AND NECK;  Surgeon: Marcelo Capps MD;  Location: [facility] OR;  Service: ENT;  Laterality: Bilateral;       Social History     Social History     Tobacco Use    Smoking status: Some Days     Types: Cigars     Passive exposure: Current    Smokeless tobacco: Never    Tobacco comments:     Quit cigarettes in 2019, smokes cigars 1-2 times a week   Substance Use Topics    Alcohol use: Yes     Comment: occasional        Family History   Reviewed and noncontributory    Allergies   Patient has no known allergies.    Home Medications     Prior to Admission medications    Medication Sig Start Date End Date Taking? Authorizing Provider   amLODIPine (NORVASC) 10 MG tablet Take 1 tablet (10 mg total) by mouth once daily. 12/17/24 12/12/25  Janeen Schaefer FNP   atorvastatin (LIPITOR) 10 MG tablet Take 1 tablet (10 mg total) by mouth every evening. 12/17/24 12/17/25  Janeen Schaefer FNP   mirabegron (MYRBETRIQ) 25 mg Tb24 ER tablet Take 25 mg by mouth once daily.    Provider, Historical   moxifloxacin (VIGAMOX) 0.5 % ophthalmic solution 1 drop 3 (three) times daily.    Provider, Historical   ofloxacin (OCUFLOX) 0.3 % ophthalmic solution Place 1 drop into the left eye 4 (four) times daily. 9/6/24   Provider, Historical   pantoprazole (PROTONIX) 40 MG tablet TAKE 1 TABLET BY MOUTH EVERY DAY 11/1/24   Ngoc Fenton MD   potassium chloride SA (K-DUR,KLOR-CON) 20 MEQ tablet Take 1 tablet (20 mEq total) by mouth once daily. 1/2/25   Janeen Schaefer FNP   pregabalin (LYRICA) 100 MG capsule Take 1 capsule (100 mg total) by mouth 2 (two) times daily. 4/17/25 4/17/26  James  Felicita STRICKLAND, ANP   tadalafiL (CIALIS) 5 MG tablet Take 1 tablet (5 mg total) by mouth daily as needed for Erectile Dysfunction. 12/17/24 12/17/25  Janeen Schaefer, JESSIEP   tamsulosin (FLOMAX) 0.4 mg Cap TAKE 1 CAPSULE BY MOUTH EVERY DAY 6/16/25   Chaitanya Rodriguez, EMILY        Inpatient Medications   Scheduled Meds   0.9% NaCl 1,000 mL with mvi, (ADULT) no.4 with vit K 3,300 unit- 150 mcg/10 mL 10 mL, thiamine 100 mg, folic acid 1 mg infusion   Intravenous Once     Continuous Infusions    PRN Meds    Current Facility-Administered Medications:     iohexoL, 100 mL, Intravenous, ONCE PRN    sodium chloride 0.9%, 10 mL, Intravenous, PRN    Physical Exam   Vital Signs  Temp:  [97.9 °F (36.6 °C)]   Pulse:  []   Resp:  [18-20]   BP: (127-150)/()   SpO2:  [98 %-100 %]       Physical Exam  Vitals and nursing note reviewed.   Constitutional:       General: He is not in acute distress.     Appearance: He is normal weight. He is ill-appearing. He is not toxic-appearing or diaphoretic.   HENT:      Head: Normocephalic and atraumatic.      Mouth/Throat:      Mouth: Mucous membranes are moist.      Pharynx: Oropharynx is clear.   Eyes:      General: No scleral icterus.     Pupils: Pupils are equal, round, and reactive to light.   Cardiovascular:      Rate and Rhythm: Regular rhythm. Tachycardia present.      Heart sounds: No murmur heard.  Pulmonary:      Effort: Pulmonary effort is normal. No respiratory distress.      Breath sounds: Normal breath sounds. No wheezing, rhonchi or rales.   Abdominal:      General: Abdomen is protuberant. There is distension.      Palpations: Abdomen is soft. There is no fluid wave.      Tenderness: There is no abdominal tenderness. There is no right CVA tenderness, left CVA tenderness or guarding. Negative signs include Marquis's sign.   Musculoskeletal:         General: No swelling or tenderness. Normal range of motion.      Right lower leg: No edema.      Left lower leg: No edema.   Skin:      "General: Skin is warm and dry.      Capillary Refill: Capillary refill takes less than 2 seconds.      Coloration: Skin is not jaundiced.      Findings: No bruising or rash.   Neurological:      General: No focal deficit present.      Mental Status: He is alert and oriented to person, place, and time. Mental status is at baseline.      GCS: GCS eye subscore is 4. GCS verbal subscore is 5. GCS motor subscore is 6.      Cranial Nerves: No cranial nerve deficit or facial asymmetry.      Motor: No weakness, tremor, abnormal muscle tone or seizure activity.      Coordination: Coordination normal.      Comments: No asterixis    Psychiatric:         Attention and Perception: Attention normal. He does not perceive auditory or visual hallucinations.         Mood and Affect: Mood and affect normal.         Speech: Speech normal.         Behavior: Behavior is not agitated or aggressive. Behavior is cooperative.         Thought Content: Thought content is not delusional. Thought content does not include homicidal or suicidal ideation.         Cognition and Memory: Cognition normal. He does not exhibit impaired recent memory.         Judgment: Judgment is not impulsive or inappropriate.          Labs     Recent Labs     06/19/25  0922   WBC 7.48   RBC 5.20   HGB 15.8   HCT 45.3   MCV 87.1   MCH 30.4   MCHC 34.9   RDW 13.2   PLT 93*     No results for input(s): "LACTIC" in the last 72 hours.  No results for input(s): "INR", "APTT", "D-DIMER" in the last 72 hours.  No results for input(s): "HGBA1C", "CHOL", "TRIG", "LDL", "VLDL", "HDL" in the last 72 hours.  No results for input(s): "PH", "PCO2", "PO2", "HCO3", "POCSATURATED", "BE" in the last 72 hours.    Recent Labs     06/19/25  0922 06/19/25  1049     --    K 3.5  --    CO2 11*  --    BUN 8.4  --    CREATININE 1.60*  --    CALCIUM 9.5  --    MG  --  2.00   ALBUMIN 4.2  --    GLOBULIN 5.8*  --    ALKPHOS 89  --    ALT 23  --    AST 61*  --    BILITOT 1.3  --    LIPASE " "158*  --      No results for input(s): "BNP", "CPK", "TROPONINI" in the last 72 hours.       Microbiology Results (last 7 days)       Procedure Component Value Units Date/Time    Blood Culture #2 **CANNOT BE ORDERED STAT** [[id number]] Collected: 06/19/25 1049    Order Status: Sent Specimen: Blood from Hand, Left Updated: 06/19/25 1056    Blood Culture #1 **CANNOT BE ORDERED STAT** [[id number]] Collected: 06/19/25 1048    Order Status: Sent Specimen: Blood from Hand, Right Updated: 06/19/25 1056           Imaging     X-Ray Abdomen Flat And Erect   Final Result      No acute process is identified.         Electronically signed by: David Walton   Date:    06/19/2025   Time:    10:06      US Abdomen Complete    (Results Pending)   CT Abdomen Pelvis With IV Contrast NO Oral Contrast    (Results Pending)     Assessment & Plan   PLAN:  Intractable N/V  Decreased PO intake   ETTA w suspected RTA - baseline Cr. 0.8-0.9  Starvation Ketoacidosis with Anion Gap 30   Hx of gastritis 2/2 H pylori   - Does not meet SIRS or septic criteria on admission   - Orthostatic BP negative following 1L bolus in ED   - Will fluid resuscitate with bicarb drip at 100cc/hr for 10 hours, will repeat BMP at this time to re-access fluid resuscitation choice   - Escalate diet as tolerated, PRN antiemetics in place  - Increased home Protonix to 40mg BID dosing as patient's symptoms are refractory to qd dosing, PRN Maalox in place    - Avoid nephrotoxic agents and renally dose medications  - Urine electrolytes for anion gap and FeNa      Hx of Polysubstance abuse - Hx of EtOH abuse and UDS + fentanyl  Hx of Gait Instability   Transaminitis 2/2 hepatic steatosis   Thrombocytopenia   - Unsure when his last drink was, at baseline mentation upon admission    - CIWA protocol, seizure, delirium and fall precautions in place  - PRN Diazepam in case of witnessed seizures   - Daily folate, thiamine and multivitamin supplementation   - PT/OT/Nutrition/CM " consulted     Proteinuria 2+ on UA  - Protein creatinine ratio ordered      HTN  Hx of HFpEF w Grade II Diastolic Dysfunction on Echo 10/7/2024   - Continue home Amlodipine 10mg qd   - PRN antihypertensives in place   - BP goal <140/90  - Continuous cardiac monitoring, strict I&Os and daily weights   - Daily AM labs, replete electrolytes as needed      HLD   - Continue home Lipitor 10mg qd     Chronic Polyneuropathy 2/2 DDD  - Continue home gabapentin 100mg TID, acceptable dosing for current CrCl          Access: Peripheral   Antibiotics: None   Diet: Clear with Advance as tolerated   DVT Prophylaxis: SCDs  GI Prophylaxis: Protonix 40mg BID    Fluids: bicarb drip for 10 hours     Dana Holt MD  [facility] HO[facility]

## 2025-06-19 NOTE — ED PROVIDER NOTES
Encounter Date: 6/19/2025       History     Chief Complaint   Patient presents with    Emesis    Abdominal Pain     PT W CO ABD PAIN W INTERMITTENT NV X 4 DAYS.  LBM X 5 DAYS. WT LOSS REPORTED > 30 LB. OVER PASS FEW MONTHS.      A 64 y.o. male patient with a history of HTN, neuropathy, GERD presents to the ED with abdominal pain and vomiting. The onset is about four days ago. Patient reports intermittent abdominal pain and vomiting over the last 2 years but says that since Monday he has been unable to hold anything down, including medications. States that he has not had a bowel movement this week but feels that it is because he cannot hold anything down. He reports decreased appetite over the last 3-4 months. Last colonoscopy was in 2024, few polyps removed, patient states he is due for repeat in 2027. Denies chest pain, SOB, hematemesis, blood in stool, fever, chills, dizziness, LOC.       The history is provided by the patient.     Review of patient's allergies indicates:  No Known Allergies  Past Medical History:   Diagnosis Date    GERD (gastroesophageal reflux disease)     Hypertension     Neuropathy     Unspecified glaucoma      Past Surgical History:   Procedure Laterality Date    ANGIOGRAM, CORONARY, WITH LEFT HEART CATHETERIZATION N/A 10/16/2024    Procedure: Angiogram, Coronary, with Left Heart Cath;  Surgeon: Chaitanya Cramer MD;  Location: [facility] CATH LAB;  Service: Cardiology;  Laterality: N/A;    CATARACT EXTRACTION W/  INTRAOCULAR LENS IMPLANT Right 8/13/2024    Procedure: EXTRACTION, CATARACT, WITH IOL INSERT- OD;  Surgeon: Duncan Arevalo MD;  Location: [facility] OR;  Service: Ophthalmology;  Laterality: Right;    CATARACT EXTRACTION W/  INTRAOCULAR LENS IMPLANT Left 9/24/2024    Procedure: EXTRACTION, CATARACT, WITH IOL INSERT- OS;  Surgeon: Duncan Arevalo MD;  Location: [facility] OR;  Service: Ophthalmology;  Laterality: Left;    COLONOSCOPY W/ POLYPECTOMY  01/28/2022    COLONOSCOPY, WITH  POLYPECTOMY USING SNARE N/A 10/30/2023    Procedure: COLONOSCOPY, WITH POLYPECTOMY USING SNARE;  Surgeon: Ngoc Fenton MD;  Location: [facility] ENDOSCOPY;  Service: Gastroenterology;  Laterality: N/A;    COLONOSCOPY, WITH POLYPECTOMY USING SNARE N/A 10/30/2024    Procedure: COLONOSCOPY, WITH POLYPECTOMY USING SNARE;  Surgeon: Ngoc Fenton MD;  Location: [facility] ENDOSCOPY;  Service: Gastroenterology;  Laterality: N/A;    CYST REMOVAL      Facial    EGD, WITH CLOSED BIOPSY N/A 10/30/2023    Procedure: EGD;  Surgeon: Ngoc Fenton MD;  Location: [facility] ENDOSCOPY;  Service: Gastroenterology;  Laterality: N/A;    SURGICAL REMOVAL OF LESION OF FACE Bilateral 07/18/2022    Procedure: EXCISION, LESION, FACE AND NECK;  Surgeon: Marcelo Capps MD;  Location: [facility] OR;  Service: ENT;  Laterality: Bilateral;     Family History   Problem Relation Name Age of Onset    Diabetes Mother      Heart disease Mother      Cancer Father          Unsure of source    Aneurysm Sister      Diabetes Brother      Stomach cancer Brother      Alzheimer's disease Maternal Grandmother       Social History[1]  Review of Systems   Constitutional:  Positive for appetite change and unexpected weight change. Negative for chills and fever.   Eyes:  Negative for visual disturbance.   Respiratory:  Negative for cough and shortness of breath.    Cardiovascular:  Negative for chest pain and leg swelling.   Gastrointestinal:  Positive for abdominal distention, abdominal pain and vomiting. Negative for constipation, diarrhea and nausea.   Genitourinary:  Negative for difficulty urinating and dysuria.   Musculoskeletal:  Negative for arthralgias.   Skin:  Negative for color change and rash.   Neurological:  Negative for dizziness, syncope, weakness and headaches.   Hematological:  Does not bruise/bleed easily.   Psychiatric/Behavioral:  Negative for confusion.    All other systems reviewed and are negative.      Physical Exam     Initial  Vitals [06/19/25 0848]   BP Pulse Resp Temp SpO2   134/82 (!) 119 18 97.9 °F (36.6 °C) 99 %      MAP       --         Physical Exam    Nursing note and vitals reviewed.  Constitutional: He appears well-developed and well-nourished.   HENT:   Head: Normocephalic and atraumatic.   Right Ear: External ear normal.   Left Ear: External ear normal.   Nose: Nose normal.   Eyes: Conjunctivae and EOM are normal.   Neck: Neck supple.   Cardiovascular:  Normal rate, regular rhythm and normal heart sounds.     Exam reveals no gallop and no friction rub.       No murmur heard.  Pulmonary/Chest: Breath sounds normal. No respiratory distress. He has no wheezes. He has no rhonchi. He has no rales.   Abdominal: Bowel sounds are normal. He exhibits distension. He exhibits no mass. There is generalized abdominal tenderness and tenderness in the right lower quadrant, epigastric area and left lower quadrant.   No right CVA tenderness.  No left CVA tenderness. There is no rebound, no guarding and negative Marquis's sign.   Musculoskeletal:      Cervical back: Neck supple.     Neurological: He is alert and oriented to person, place, and time. GCS eye subscore is 4. GCS verbal subscore is 5. GCS motor subscore is 6.   Skin: Skin is warm and dry. Capillary refill takes less than 2 seconds.         ED Course   Procedures  Labs Reviewed   COMPREHENSIVE METABOLIC PANEL - Abnormal       Result Value    Sodium 138      Potassium 3.5      Chloride 97 (*)     CO2 11 (*)     Glucose 126 (*)     Blood Urea Nitrogen 8.4      Creatinine 1.60 (*)     Calcium 9.5      Protein Total 10.0 (*)     Albumin 4.2      Globulin 5.8 (*)     Albumin/Globulin Ratio 0.7 (*)     Bilirubin Total 1.3      ALP 89      ALT 23      AST 61 (*)     eGFR 48      Anion Gap 30.0      BUN/Creatinine Ratio 5     LIPASE - Abnormal    Lipase Level 158 (*)    URINALYSIS, REFLEX TO URINE CULTURE - Abnormal    Color, UA Yellow      Appearance, UA Turbid (*)     Specific [place], UA  1.022      pH, UA 6.0      Protein, UA 2+ (*)     Glucose, UA Normal      Ketones, UA 4+ (*)     Blood, UA 2+ (*)     Bilirubin, UA 2+ (*)     Urobilinogen, UA 2+ (*)     Nitrites, UA Negative      Leukocyte Esterase, UA Negative      RBC, UA 6-10 (*)     WBC, UA 0-5      Bacteria, UA None Seen      Squamous Epithelial Cells, UA Trace (*)     Mucous, UA Occasional (*)     Hyaline Casts, UA >20 (*)    CBC WITH DIFFERENTIAL - Abnormal    WBC 7.48      RBC 5.20      Hgb 15.8      Hct 45.3      MCV 87.1      MCH 30.4      MCHC 34.9      RDW 13.2      Platelet 93 (*)     MPV 12.7 (*)     Neut % 86.6      Lymph % 5.3      Mono % 7.2      Eos % 0.1      Basophil % 0.3      Imm Grans % 0.5      Neut # 6.47      Lymph # 0.40 (*)     Mono # 0.54      Eos # 0.01      Baso # 0.02      Imm Gran # 0.04      NRBC% 0.0     DRUG SCREEN, URINE (BEAKER) - Abnormal    Amphetamines, Urine Negative      Barbiturates, Urine Negative      Benzodiazepine, Urine Negative      Cannabinoids, Urine Negative      Cocaine, Urine Negative      Fentanyl, Urine Positive (*)     MDMA, Urine Negative      Opiates, Urine Negative      Phencyclidine, Urine Negative      pH, Urine 6.0      Specific Gravity, Urine Auto 1.022      Narrative:     Cut off concentrations:    Amphetamines - 1000 ng/ml  Barbiturates - 200 ng/ml  Benzodiazepine - 200 ng/ml  Cannabinoids (THC) - 50 ng/ml  Cocaine - 300 ng/ml  Fentanyl - 1.0 ng/ml  MDMA - 500 ng/ml  Opiates - 300 ng/ml   Phencyclidine (PCP) - 25 ng/ml    Specimen submitted for drug analysis and tested for pH and specific gravity in order to evaluate sample integrity. Suspect tampering if specific gravity is <1.003 and/or pH is not within the range of 4.5 - 8.0  False negatives may result form substances such as bleach added to urine.  False positives may result for the presence of a substance with similar chemical structure to the drug or its metabolite.    This test provides only a PRELIMINARY analytical test  result. A more specific alternate chemical method must be used in order to obtain a confirmed analytical result. Gas chromatography/mass spectrometry (GC/MS) is the preferred confirmatory method. Other chemical confirmation methods are available. Clinical consideration and professional judgement should be applied to any drug of abuse test result, particularly when preliminary positive results are used.    Positive results will be confirmed only at the physicians request. Unconfirmed screening results are to be used only for medical purposes (treatment).        BETA - HYDROXYBUTYRATE, SERUM - Abnormal    Beta Hydroxybutyrate 9.90 (*)    PROTEIN / CREATININE RATIO, URINE - Abnormal    Urine Protein Level 255.2      Urine Creatinine 214.7 (*)     Urine Protein/Creatinine Ratio 1.2     LACTIC ACID, PLASMA - Normal    Lactic Acid Level 1.3     ALCOHOL,MEDICAL (ETHANOL) - Normal    Ethanol Level <10.0     MAGNESIUM - Normal    Magnesium Level 2.00     PROTIME-INR - Normal    PT 13.5      INR 1.0      Narrative:     Protimes are used to monitor anticoagulant agents such as warfarin. PT INR values are based on the current patient normal mean and the PACO value for the specific instrument reagent used.  **Routine theraputic target values for the INR are 2.0-3.0**   BLOOD CULTURE [facility]   BLOOD CULTURE [facility]   CBC W/ AUTO DIFFERENTIAL    Narrative:     The following orders were created for panel order CBC Auto Differential.  Procedure                               Abnormality         Status                     ---------                               -----------         ------                     CBC with Differential[[id number]]       Abnormal            Final result                 Please view results for these tests on the individual orders.   EXTRA TUBES    Narrative:     The following orders were created for panel order EXTRA TUBES.  Procedure                               Abnormality         Status                      ---------                               -----------         ------                     Light Blue Top Hold[[id number]]                             Final result               Red Top Hold[[id number]]                                    Final result               Light Green Top Hold[[id number]]                            Final result               Lavender Top Hold[[id number]]                               Final result               Gold Top Hold[[id number]]                                   Final result                 Please view results for these tests on the individual orders.   LIGHT BLUE TOP HOLD    Extra Tube Hold for add-ons.     RED TOP HOLD    Extra Tube Hold for add-ons.     LIGHT GREEN TOP HOLD    Extra Tube Hold for add-ons.     LAVENDER TOP HOLD    Extra Tube Hold for add-ons.     GOLD TOP HOLD    Extra Tube Hold for add-ons.     LIGHT BLUE TOP HOLD    Extra Tube Hold for add-ons.     RED TOP HOLD    Extra Tube Hold for add-ons.     LAVENDER TOP HOLD    Extra Tube Hold for add-ons.     LAVENDER TOP HOLD    Extra Tube Hold for add-ons.     GOLD TOP HOLD    Extra Tube Hold for add-ons.     PINK TOP HOLD    Extra Tube Hold for add-ons.     EXTRA TUBES    Narrative:     The following orders were created for panel order EXTRA TUBES.  Procedure                               Abnormality         Status                     ---------                               -----------         ------                     Light Green Top Hold[[id number]]                            Final result                 Please view results for these tests on the individual orders.   LIGHT GREEN TOP HOLD    Extra Tube Hold for add-ons.     SODIUM, URINE, RANDOM    Urine Sodium 61.0     POTASSIUM, URINE, RANDOM    Urine Potassium 81.9     CHLORIDE, URINE, RANDOM    Urine Chloride <20.0     EXTRA TUBES    Narrative:     The following orders were created for panel order EXTRA TUBES.  Procedure                               Abnormality          Status                     ---------                               -----------         ------                     Light Blue Top Hold[[id number]]                             Final result               Red Top Hold[[id number]]                                    Final result               Lavender Top Hold[[id number]]                               Final result               Lavender Top Hold[[id number]]                               Final result               Gold Top Hold[[id number]]                                   Final result               Pink Top Hold[[id number]]                                   Final result               Lewis Top Hold[[id number]]                                                                Please view results for these tests on the individual orders.   GREY TOP HOLD   OSMOLALITY, URINE RANDOM   OSMOLALITY, SERUM   HELICOBACTER PYLORI ANTIGEN FECAL EIA   GAMMA GT     EKG Readings: (Independently Interpreted)   Rhythm: Normal Sinus Rhythm. Heart Rate: 99. Ectopy: No Ectopy. Conduction: Normal. ST Segments: Normal ST Segments. T Waves: Normal. Other Findings: Shortened LA Interval. Clinical Impression: Normal Sinus Rhythm     ECG Results              EKG 12-lead (Final result)        Collection Time Result Time QRS Duration OHS QTC Calculation    06/19/25 11:59:05 06/19/25 13:36:00 [id number]                     Final result by Interface, Lab In [facility] (06/19/25 13:36:09)                   Narrative:    Test Reason : R00.0,    Vent. Rate : 102 BPM     Atrial Rate : 102 BPM     P-R Int : 176 ms          QRS Dur :  86 ms      QT Int : 352 ms       P-R-T Axes :  14 -15  28 degrees    QTcB Int : 458 ms    Sinus tachycardia  T wave abnormality, consider inferior ischemia  T wave abnormality, consider anterolateral ischemia  Abnormal ECG  When compared with ECG of 08-Oct-2024 08:49,  LA interval has decreased  Vent. rate has increased by  40 bpm  ST no longer elevated in Anterior leads  T wave  inversion now evident in Inferior leads  T wave inversion now evident in Anterior leads  Confirmed by Samy Mclaughlin ([id number]) on 6/19/2025 1:35:58 PM    Referred By: AAAREFERRAL SELF           Confirmed By: Samy Mclaughlin                                  Imaging Results              CT Abdomen Pelvis With IV Contrast NO Oral Contrast (Final result)  Result time 06/19/25 12:05:08      Final result by Devon Crump MD (06/19/25 12:05:08)                   Impression:      Hepatomegaly and hepatic steatosis    Otherwise unremarkable      Electronically signed by: Isael Crump  Date:    06/19/2025  Time:    12:05               Narrative:    EXAMINATION:  CT ABDOMEN PELVIS WITH IV CONTRAST    CLINICAL HISTORY:  Pancreatitis, acute, severe;    TECHNIQUE:  Low dose axial images, sagittal and coronal reformations were obtained from the lung bases to the pubic symphysis following the IV administration of contrast. Automatic exposure control (AEC) is utilized to reduce patient radiation exposure.    COMPARISON:  05/03/2023    FINDINGS:  The lung bases are clear.    The liver is enlarged and there is evidence of hepatic steatosis.    .  No liver mass or lesion is seen.  Portal and hepatic veins appear normal.    The gallbladder appears normal.  No obvious gallstones are seen.  No biliary dilatation is seen.  No pericholecystic fluid is seen.    The pancreas appears normal.  No pancreatic mass or lesion is seen.  No inflammatory changes are seen.  No evidence of acute pancreatitis or chronic pancreatitis is seen.    The spleen shows no acute abnormality.    The adrenal glands appear normal.  No adrenal nodule is seen.    The kidneys appear normal.  No hydronephrosis is seen.  No hydroureter is seen.  No nephrolithiasis is seen.  No obvious ureteral stones are seen.    Urinary bladder appears grossly unremarkable.  The prostate is mildly enlarged in size.    No colitis is seen.  No diverticulitis is seen.  No obvious colonic  mass or lesion is seen.    No free air is seen.  No free fluid is seen.                                       US Abdomen Complete (Final result)  Result time 06/19/25 11:50:13      Final result by Lazaro Dunaway MD (06/19/25 11:50:13)                   Impression:      Layering gallbladder sludge with borderline CBD dilatation.    Hepatomegaly and hepatic steatosis.      Electronically signed by: Lazaro Dunaway  Date:    06/19/2025  Time:    11:50               Narrative:    EXAMINATION:  US ABDOMEN COMPLETE    CLINICAL HISTORY:  Acute pancreatitis without necrosis or infection, unspecified    TECHNIQUE:  Gray-scale and color Doppler images of the abdomen.    COMPARISON:  No relevant comparison studies available at the time of dictation.    FINDINGS:  Enlarged liver with increased overall hepatic echogenicity.  No focal liver lesion identified.  Main portal vein patent with normal flow direction.    Layering gallbladder sludge.  No defined shadowing gallstone.  No gallbladder wall thickening or significant ascites.  Borderline dilatation of the common bile duct.    Pancreas partially obscured with no abnormality identified as imaged.  Imaged segments of the abdominal aorta normal in caliber.  No hydronephrosis.  Normal spleen size.    Measurements:    - Liver: 20.0 cm    - CBD: 8 mm in diameter    - Right kidney: 11.9 cm in length    - Left kidney: 12.0 cm in length    - Spleen: 8.0 cm                                       X-Ray Abdomen Flat And Erect (Final result)  Result time 06/19/25 10:06:50      Final result by David Walton MD (06/19/25 10:06:50)                   Impression:      No acute process is identified.      Electronically signed by: David Walton  Date:    06/19/2025  Time:    10:06               Narrative:    EXAMINATION:  XR ABDOMEN FLAT AND ERECT    CLINICAL HISTORY:  , Constipation, unspecified.    FINDINGS:  The bowel gas pattern is nonspecific, and no free air or pneumatosis is  seen. There is no evidence for organomegaly. No abnormal calcifications are seen. The visualized osseous structures are unremarkable.                                       Medications   sodium bicarbonate 150 mEq in sterile water 1,000 mL infusion (has no administration in time range)   sodium chloride 0.9% flush 10 mL (has no administration in time range)   naloxone 0.4 mg/mL injection 0.02 mg (has no administration in time range)   glucose chewable tablet 16 g (has no administration in time range)   glucose chewable tablet 24 g (has no administration in time range)   dextrose 50% injection 12.5 g (has no administration in time range)   dextrose 50% injection 25 g (has no administration in time range)   glucagon (human recombinant) injection 1 mg (has no administration in time range)   ondansetron disintegrating tablet 8 mg (has no administration in time range)   aluminum-magnesium hydroxide-simethicone 200-200-20 mg/5 mL suspension 30 mL (has no administration in time range)   gabapentin capsule 100 mg (100 mg Oral Given by Other 6/19/25 1436)   diazePAM injection 5 mg (has no administration in time range)   multivitamin tablet (1 tablet Oral Given by Other 6/19/25 1436)   folic acid tablet 1 mg (1 mg Oral Given by Other 6/19/25 1436)   thiamine tablet 100 mg (100 mg Oral Given by Other 6/19/25 1436)   prochlorperazine tablet 10 mg (has no administration in time range)   amLODIPine tablet 10 mg (has no administration in time range)   atorvastatin tablet 10 mg (has no administration in time range)   pantoprazole EC tablet 40 mg (has no administration in time range)   labetalol 20 mg/4 mL (5 mg/mL) IV syring (has no administration in time range)   ondansetron disintegrating tablet 4 mg (4 mg Oral Given 6/19/25 0918)   lactated ringers bolus 1,000 mL (0 mLs Intravenous Stopped 6/19/25 1204)   0.9% NaCl 1,000 mL with mvi, (ADULT) no.4 with vit K 3,300 unit- 150 mcg/10 mL 10 mL, thiamine 100 mg, folic acid 1 mg  infusion ( Intravenous New Bag 6/19/25 1150)   prochlorperazine injection Soln 5 mg (5 mg Intravenous Given 6/19/25 1106)   iohexoL (OMNIPAQUE 350) injection 100 mL (100 mLs Intravenous Given 6/19/25 1153)     Medical Decision Making  A 64 y.o. male patient with a history of HTN, neuropathy, GERD presents to the ED with abdominal pain and vomiting. The onset is about four days ago. Patient reports intermittent abdominal pain and vomiting over the last 2 years but says that since Monday he has been unable to hold anything down, including medications. States that he has not had a bowel movement this week but feels that it is because he cannot hold anything down. He reports decreased appetite over the last 3-4 months. Last colonoscopy was in 2024, few polyps removed, patient states he is due for repeat in 2027. Denies chest pain, SOB, hematemesis, blood in stool, fever, chills, dizziness, LOC.     CMP shows anion gap elevated at 30. CO2 decreased at 11, suggesting high anion gap metabolic acidosis.     Lipase elevated at 158. Abdominal x-ray showed no acute process.     Creatinine elevated at 1.60,. Hyaline casts and RBC present in urine, suggestive of ETTA.     UDS positive for fentanyl. ETOH <10.     CT shows hepatomegaly. US shows gall bladder sludge and possible mild CBD dilation.     IM to admit.     Amount and/or Complexity of Data Reviewed  Labs: ordered. Decision-making details documented in ED Course.  Radiology: ordered. Decision-making details documented in ED Course.  Discussion of management or test interpretation with external provider(s): I discussed the case with Dr. Yin. They recommend consulting internal medicine for admission.   I discussed the case with Internal Medicine. Agrees with current management. See consult note.      Risk  Prescription drug management.      Additional MDM:   Differential Diagnosis:   Other: The following diagnoses were also considered and will be evaluated: GERD, PUD and  Viral syndrome.            ED Course as of 06/19/25 1438   Thu Jun 19, 2025   1000 WBC: 7.48 [AG]   1000 Hemoglobin: 15.8 [AG]   1013 X-Ray Abdomen Flat And Erect  No acute process is identified.   [AG]   1013 Creatinine(!): 1.60  ETTA [AG]   1013 Anion Gap: 30.0 [AG]   1013 Lipase(!): 158 [AG]   1023 Hyaline Casts, UA(!): >20 [AG]   1023 RBC, UA(!): 6-10 [AG]   1109 Fentanyl, Urine(!): Positive [AG]   1119 Lactic Acid Level: 1.3 [AG]   1125 Alcohol, Serum: <10.0 [AG]   1125 Magnesium : 2.00 [AG]   1140 Beta-Hydroxybutyrate(!): 9.90 [AG]   1201 US Abdomen Complete  Impression:     Layering gallbladder sludge with borderline CBD dilatation.     Hepatomegaly and hepatic steatosis.   [AG]   1207 CT Abdomen Pelvis With IV Contrast NO Oral Contrast  Hepatomegaly and hepatic steatosis     Otherwise unremarkable   [AG]      ED Course User Index  [AG] Nichol Virgen PA                           Clinical Impression:  Final diagnoses:  [K59.00] Constipation  [K85.90] Pancreatitis  [E87.20] Metabolic acidosis (Primary)          ED Disposition Condition    Observation                       [1]   Social History  Tobacco Use    Smoking status: Some Days     Types: Cigars     Passive exposure: Current    Smokeless tobacco: Never    Tobacco comments:     Quit cigarettes in 2019, smokes cigars 1-2 times a week   Vaping Use    Vaping status: Never Used   Substance Use Topics    Alcohol use: Yes     Comment: occasional    Drug use: Never        Nichol Virgen PA  06/19/25 1438

## 2025-06-20 PROBLEM — K85.00 IDIOPATHIC ACUTE PANCREATITIS WITHOUT INFECTION OR NECROSIS: Status: ACTIVE | Noted: 2025-06-20

## 2025-06-20 PROBLEM — E43 SEVERE MALNUTRITION: Status: ACTIVE | Noted: 2025-06-20

## 2025-06-20 LAB
ALBUMIN SERPL-MCNC: 3.2 G/DL (ref 3.4–4.8)
ALBUMIN/GLOB SERPL: 0.8 RATIO (ref 1.1–2)
ALP SERPL-CCNC: 65 UNIT/L (ref 40–150)
ALT SERPL-CCNC: 16 UNIT/L (ref 0–55)
ANION GAP SERPL CALC-SCNC: 12 MEQ/L
ANION GAP SERPL CALC-SCNC: 13 MEQ/L
ANION GAP SERPL CALC-SCNC: 14 MEQ/L
AST SERPL-CCNC: 49 UNIT/L (ref 11–45)
BASOPHILS # BLD AUTO: 0.02 X10(3)/MCL
BASOPHILS NFR BLD AUTO: 0.5 %
BILIRUB SERPL-MCNC: 1.3 MG/DL
BUN SERPL-MCNC: 4.5 MG/DL (ref 8.4–25.7)
BUN SERPL-MCNC: 6.3 MG/DL (ref 8.4–25.7)
BUN SERPL-MCNC: 6.6 MG/DL (ref 8.4–25.7)
CALCIUM SERPL-MCNC: 8.4 MG/DL (ref 8.8–10)
CALCIUM SERPL-MCNC: 8.4 MG/DL (ref 8.8–10)
CALCIUM SERPL-MCNC: 8.8 MG/DL (ref 8.8–10)
CHLORIDE SERPL-SCNC: 100 MMOL/L (ref 98–107)
CHLORIDE SERPL-SCNC: 101 MMOL/L (ref 98–107)
CHLORIDE SERPL-SCNC: 102 MMOL/L (ref 98–107)
CO2 SERPL-SCNC: 21 MMOL/L (ref 23–31)
CO2 SERPL-SCNC: 21 MMOL/L (ref 23–31)
CO2 SERPL-SCNC: 22 MMOL/L (ref 23–31)
CREAT SERPL-MCNC: 0.76 MG/DL (ref 0.72–1.25)
CREAT SERPL-MCNC: 0.97 MG/DL (ref 0.72–1.25)
CREAT SERPL-MCNC: 1.04 MG/DL (ref 0.72–1.25)
CREAT/UREA NIT SERPL: 6
EOSINOPHIL # BLD AUTO: 0.06 X10(3)/MCL (ref 0–0.9)
EOSINOPHIL NFR BLD AUTO: 1.4 %
ERYTHROCYTE [DISTWIDTH] IN BLOOD BY AUTOMATED COUNT: 12.7 % (ref 11.5–17)
GFR SERPLBLD CREATININE-BSD FMLA CKD-EPI: >60 ML/MIN/1.73/M2
GLOBULIN SER-MCNC: 4.2 GM/DL (ref 2.4–3.5)
GLUCOSE SERPL-MCNC: 106 MG/DL (ref 82–115)
GLUCOSE SERPL-MCNC: 112 MG/DL (ref 82–115)
GLUCOSE SERPL-MCNC: 115 MG/DL (ref 82–115)
H. PYLORI STOOL: NEGATIVE
HCT VFR BLD AUTO: 35.8 % (ref 42–52)
HGB BLD-MCNC: 12.8 G/DL (ref 14–18)
IMM GRANULOCYTES # BLD AUTO: 0 X10(3)/MCL (ref 0–0.04)
IMM GRANULOCYTES NFR BLD AUTO: 0 %
LYMPHOCYTES # BLD AUTO: 1.09 X10(3)/MCL (ref 0.6–4.6)
LYMPHOCYTES NFR BLD AUTO: 25.5 %
MAGNESIUM SERPL-MCNC: 1.6 MG/DL (ref 1.6–2.6)
MAGNESIUM SERPL-MCNC: 1.9 MG/DL (ref 1.6–2.6)
MCH RBC QN AUTO: 29.8 PG (ref 27–31)
MCHC RBC AUTO-ENTMCNC: 35.8 G/DL (ref 33–36)
MCV RBC AUTO: 83.3 FL (ref 80–94)
MONOCYTES # BLD AUTO: 0.62 X10(3)/MCL (ref 0.1–1.3)
MONOCYTES NFR BLD AUTO: 14.5 %
NEUTROPHILS # BLD AUTO: 2.48 X10(3)/MCL (ref 2.1–9.2)
NEUTROPHILS NFR BLD AUTO: 58.1 %
NRBC BLD AUTO-RTO: 0 %
PHOSPHATE SERPL-MCNC: 0.9 MG/DL (ref 2.3–4.7)
PHOSPHATE SERPL-MCNC: 1.2 MG/DL (ref 2.3–4.7)
PLATELET # BLD AUTO: 71 X10(3)/MCL (ref 130–400)
PLATELETS.RETICULATED NFR BLD AUTO: 11.1 % (ref 0.9–11.2)
PMV BLD AUTO: 11 FL (ref 7.4–10.4)
POTASSIUM SERPL-SCNC: 3.1 MMOL/L (ref 3.5–5.1)
POTASSIUM SERPL-SCNC: 3.3 MMOL/L (ref 3.5–5.1)
POTASSIUM SERPL-SCNC: 3.3 MMOL/L (ref 3.5–5.1)
PROT SERPL-MCNC: 7.4 GM/DL (ref 5.8–7.6)
RBC # BLD AUTO: 4.3 X10(6)/MCL (ref 4.7–6.1)
SODIUM SERPL-SCNC: 134 MMOL/L (ref 136–145)
SODIUM SERPL-SCNC: 135 MMOL/L (ref 136–145)
SODIUM SERPL-SCNC: 137 MMOL/L (ref 136–145)
WBC # BLD AUTO: 4.27 X10(3)/MCL (ref 4.5–11.5)

## 2025-06-20 PROCEDURE — 84100 ASSAY OF PHOSPHORUS: CPT | Mod: 91 | Performed by: INTERNAL MEDICINE

## 2025-06-20 PROCEDURE — 83735 ASSAY OF MAGNESIUM: CPT | Performed by: INTERNAL MEDICINE

## 2025-06-20 PROCEDURE — 25000003 PHARM REV CODE 250: Performed by: INTERNAL MEDICINE

## 2025-06-20 PROCEDURE — 85025 COMPLETE CBC W/AUTO DIFF WBC: CPT | Performed by: INTERNAL MEDICINE

## 2025-06-20 PROCEDURE — 96368 THER/DIAG CONCURRENT INF: CPT

## 2025-06-20 PROCEDURE — 25000003 PHARM REV CODE 250

## 2025-06-20 PROCEDURE — 96367 TX/PROPH/DG ADDL SEQ IV INF: CPT

## 2025-06-20 PROCEDURE — 36415 COLL VENOUS BLD VENIPUNCTURE: CPT

## 2025-06-20 PROCEDURE — 96366 THER/PROPH/DIAG IV INF ADDON: CPT

## 2025-06-20 PROCEDURE — 80048 BASIC METABOLIC PNL TOTAL CA: CPT

## 2025-06-20 PROCEDURE — 87338 HPYLORI STOOL AG IA: CPT

## 2025-06-20 PROCEDURE — 36415 COLL VENOUS BLD VENIPUNCTURE: CPT | Performed by: INTERNAL MEDICINE

## 2025-06-20 PROCEDURE — G0378 HOSPITAL OBSERVATION PER HR: HCPCS

## 2025-06-20 PROCEDURE — 94761 N-INVAS EAR/PLS OXIMETRY MLT: CPT

## 2025-06-20 PROCEDURE — 63600175 PHARM REV CODE 636 W HCPCS: Performed by: INTERNAL MEDICINE

## 2025-06-20 PROCEDURE — 97116 GAIT TRAINING THERAPY: CPT

## 2025-06-20 PROCEDURE — 84100 ASSAY OF PHOSPHORUS: CPT

## 2025-06-20 PROCEDURE — 84100 ASSAY OF PHOSPHORUS: CPT | Mod: 91

## 2025-06-20 PROCEDURE — 80053 COMPREHEN METABOLIC PANEL: CPT | Performed by: INTERNAL MEDICINE

## 2025-06-20 PROCEDURE — 63600175 PHARM REV CODE 636 W HCPCS

## 2025-06-20 PROCEDURE — 97165 OT EVAL LOW COMPLEX 30 MIN: CPT

## 2025-06-20 PROCEDURE — 96361 HYDRATE IV INFUSION ADD-ON: CPT

## 2025-06-20 PROCEDURE — 83735 ASSAY OF MAGNESIUM: CPT | Mod: 91

## 2025-06-20 RX ORDER — SODIUM,POTASSIUM PHOSPHATES 280-250MG
2 POWDER IN PACKET (EA) ORAL 3 TIMES DAILY
Status: DISCONTINUED | OUTPATIENT
Start: 2025-06-20 | End: 2025-06-21 | Stop reason: HOSPADM

## 2025-06-20 RX ORDER — ONDANSETRON 4 MG/1
4 TABLET, ORALLY DISINTEGRATING ORAL EVERY 8 HOURS PRN
Qty: 30 TABLET | Refills: 0 | Status: SHIPPED | OUTPATIENT
Start: 2025-06-20

## 2025-06-20 RX ORDER — SODIUM,POTASSIUM PHOSPHATES 280-250MG
3 POWDER IN PACKET (EA) ORAL ONCE
Status: COMPLETED | OUTPATIENT
Start: 2025-06-20 | End: 2025-06-20

## 2025-06-20 RX ORDER — POTASSIUM CHLORIDE 20 MEQ/1
60 TABLET, EXTENDED RELEASE ORAL ONCE
Status: DISCONTINUED | OUTPATIENT
Start: 2025-06-20 | End: 2025-06-20

## 2025-06-20 RX ORDER — MAGNESIUM SULFATE HEPTAHYDRATE 40 MG/ML
2 INJECTION, SOLUTION INTRAVENOUS ONCE
Status: COMPLETED | OUTPATIENT
Start: 2025-06-20 | End: 2025-06-20

## 2025-06-20 RX ORDER — DIATRIZOATE MEGLUMINE AND DIATRIZOATE SODIUM 660; 100 MG/ML; MG/ML
SOLUTION ORAL; RECTAL
Status: DISPENSED
Start: 2025-06-20 | End: 2025-06-20

## 2025-06-20 RX ORDER — POTASSIUM CHLORIDE 20 MEQ/1
60 TABLET, EXTENDED RELEASE ORAL ONCE
Status: COMPLETED | OUTPATIENT
Start: 2025-06-20 | End: 2025-06-20

## 2025-06-20 RX ORDER — SODIUM,POTASSIUM PHOSPHATES 280-250MG
2 POWDER IN PACKET (EA) ORAL ONCE
Status: DISCONTINUED | OUTPATIENT
Start: 2025-06-20 | End: 2025-06-20

## 2025-06-20 RX ORDER — PANTOPRAZOLE SODIUM 40 MG/1
40 TABLET, DELAYED RELEASE ORAL 2 TIMES DAILY
Qty: 180 TABLET | Refills: 3 | Status: SHIPPED | OUTPATIENT
Start: 2025-06-20 | End: 2026-06-20

## 2025-06-20 RX ADMIN — PANTOPRAZOLE SODIUM 40 MG: 40 TABLET, DELAYED RELEASE ORAL at 08:06

## 2025-06-20 RX ADMIN — GABAPENTIN 100 MG: 100 CAPSULE ORAL at 08:06

## 2025-06-20 RX ADMIN — MAGNESIUM SULFATE HEPTAHYDRATE 2 G: 40 INJECTION, SOLUTION INTRAVENOUS at 12:06

## 2025-06-20 RX ADMIN — SODIUM CHLORIDE, POTASSIUM CHLORIDE, SODIUM LACTATE AND CALCIUM CHLORIDE: 600; 310; 30; 20 INJECTION, SOLUTION INTRAVENOUS at 08:06

## 2025-06-20 RX ADMIN — FOLIC ACID 1 MG: 1 TABLET ORAL at 08:06

## 2025-06-20 RX ADMIN — GABAPENTIN 100 MG: 100 CAPSULE ORAL at 04:06

## 2025-06-20 RX ADMIN — POTASSIUM CHLORIDE 60 MEQ: 1500 TABLET, EXTENDED RELEASE ORAL at 03:06

## 2025-06-20 RX ADMIN — POTASSIUM & SODIUM PHOSPHATES POWDER PACK 280-160-250 MG 3 PACKET: 280-160-250 PACK at 03:06

## 2025-06-20 RX ADMIN — POTASSIUM PHOSPHATE, MONOBASIC POTASSIUM PHOSPHATE, DIBASIC 30 MMOL: 224; 236 INJECTION, SOLUTION, CONCENTRATE INTRAVENOUS at 08:06

## 2025-06-20 RX ADMIN — THERA TABS 1 TABLET: TAB at 08:06

## 2025-06-20 RX ADMIN — POTASSIUM & SODIUM PHOSPHATES POWDER PACK 280-160-250 MG 3 PACKET: 280-160-250 PACK at 06:06

## 2025-06-20 RX ADMIN — POTASSIUM & SODIUM PHOSPHATES POWDER PACK 280-160-250 MG 2 PACKET: 280-160-250 PACK at 08:06

## 2025-06-20 RX ADMIN — ATORVASTATIN CALCIUM 10 MG: 10 TABLET, FILM COATED ORAL at 08:06

## 2025-06-20 RX ADMIN — AMLODIPINE BESYLATE 10 MG: 10 TABLET ORAL at 08:06

## 2025-06-20 RX ADMIN — Medication 100 MG: at 08:06

## 2025-06-20 RX ADMIN — POTASSIUM PHOSPHATE, MONOBASIC POTASSIUM PHOSPHATE, DIBASIC 30 MMOL: 224; 236 INJECTION, SOLUTION, CONCENTRATE INTRAVENOUS at 12:06

## 2025-06-20 RX ADMIN — POTASSIUM CHLORIDE 60 MEQ: 1500 TABLET, EXTENDED RELEASE ORAL at 08:06

## 2025-06-20 NOTE — CONSULTS
Inpatient Nutrition Assessment    Admit Date: 6/19/2025   Total duration of encounter: 1 day   Patient Age: 64 y.o.    Nutrition Recommendation/Prescription     When appropriate--ADAT to heart healthy diet  When diet progressed --order boost breeze tid; Boost Breeze (provides 250 kcal, 9 g protein per serving)   If pt to reman NPO; hx poor po intake /wt loss; suggest use supplemental PPN to help maintain nutrition status. Clinimix 4.25/5 @ 100 ml/hr with lipids 20% 250 ml daily to provide 1316 calories, 102 gm protein.  continue electrolyte repletion as needed--Low K/P levels  Continue folic acid, MVI, thiamine supplementation--hx ETOH use   Biweekly wt  Will monitor nutrition status     Communication of Recommendations: reviewed with nurse and reviewed with patient    Nutrition Assessment     Malnutrition Assessment/Nutrition-Focused Physical Exam    Malnutrition Context: chronic illness (06/20/25 1343)  Malnutrition Level: severe (06/20/25 1343)  Energy Intake (Malnutrition): less than or equal to 50% for greater than or equal to 1 month (06/20/25 1343)  Weight Loss (Malnutrition): greater than 7.5% in 3 months (06/20/25 1343)              Muscle Mass (Malnutrition): mild depletion (06/20/25 1343)  [place] Region (Muscle Loss): mild depletion  Clavicle Bone Region (Muscle Loss): well nourished                    Fluid Accumulation (Malnutrition): other (see comments) (Not present) (06/20/25 1343)     Hand  Strength, Right (Malnutrition): Unable to assess (06/20/25 1343)  A minimum of two characteristics is recommended for diagnosis of either severe or non-severe malnutrition.    Chart Review    Reason Seen: continuous nutrition monitoring and physician consult for malnutrition     Malnutrition Screening Tool Results   Have you recently lost weight without trying?: No  Have you been eating poorly because of a decreased appetite?: No   MST Score: 0   Diagnosis:  Intractable N/V, decreased po intake, ETTA,  starvation ketoacidosis, hx gastritis, Hx polysubstance /ETOH abuse, transaminitis, trombocytopenia, HTN, CHF, HLD     Relevant Medical History: CHF, HTN, HLD< polysubstance abuse, gastritis, H pylori, BPH    Scheduled Medications:  amLODIPine, 10 mg, Daily  atorvastatin, 10 mg, Daily  diatrizoate meglumineand-diatrizoate sodium, ,   folic acid, 1 mg, Daily  gabapentin, 100 mg, TID  magnesium sulfate 2 g IVPB, 2 g, Once  multivitamin, 1 tablet, Daily  pantoprazole, 40 mg, BID  potassium phosphate IVPB, 30 mmol, Once  thiamine, 100 mg, Daily    Continuous Infusions:  lactated ringers, Last Rate: 100 mL/hr at 06/20/25 0834    PRN Medications:  aluminum-magnesium hydroxide-simethicone, 30 mL, QID PRN  dextrose 50%, 12.5 g, PRN  dextrose 50%, 25 g, PRN  diatrizoate meglumineand-diatrizoate sodium, ,   diazePAM, 5 mg, Q4H PRN  glucagon (human recombinant), 1 mg, PRN  glucose, 16 g, PRN  glucose, 24 g, PRN  labetalol, 10 mg, Q4H PRN  naloxone, 0.02 mg, PRN  ondansetron, 8 mg, Q8H PRN  prochlorperazine, 10 mg, TID PRN  sodium chloride 0.9%, 10 mL, Q12H PRN    Calorie Containing IV Medications: no significant kcals from medications at this time    Recent Labs   Lab 06/19/25  0922 06/19/25  1049 06/19/25  1931 06/20/25  0021 06/20/25  0336 06/20/25  0919     --  136 135* 134*  --    K 3.5  --  2.7* 3.3* 3.1*  --    CALCIUM 9.5  --  8.6* 8.4* 8.4*  --    PHOS  --   --  0.7* 0.9* 0.9* 0.9*   MG  --  2.00 1.80  --  1.60  --    CL 97*  --  99 100 101  --    CO2 11*  --  20* 22* 21*  --    BUN 8.4  --  7.0* 6.6* 6.3*  --    CREATININE 1.60*  --  1.18 1.04 0.97  --    EGFRNORACEVR 48  --  >60 >60 >60  --    *  --  122* 115 106  --    BILITOT 1.3  --  1.2  --  1.3  --    ALKPHOS 89  --  67  --  65  --    ALT 23  --  16  --  16  --    AST 61*  --  43  --  49*  --    ALBUMIN 4.2  --  3.3*  --  3.2*  --    LIPASE 158*  --   --   --   --   --    WBC 7.48  --  4.32*  --  4.27*  --    HGB 15.8  --  13.3*  --  12.8*  --   "  HCT 45.3  --  36.4*  --  35.8*  --      Nutrition Orders:  Diet NPO      Appetite/Oral Intake: poor/25-50% of meals  Factors Affecting Nutritional Intake: abdominal pain, altered gastrointestinal function, decreased appetite, excessive alcohol intake, nausea, NPO, and vomiting  Social Needs Impacting Access to Food: none identified  Food/Hinduism/Cultural Preferences: none reported  Food Allergies: none reported  Last Bowel Movement: 25  Wound(s):  none    Comments    () Pt NPO during rounds; pt reported may be having EGD today. Pt reported 4 day hx N/V/abdominal pain; appetite decreased for some time; + significant wt loss--approx 12% over 3-4 months. + temporal wasting. Abnormal labs noted: K/P (L)--on repletion. Consider use PPN while NPO/poor po intake to help meet nutrient needs; see recs.     Anthropometrics    Height: 5' 8" (172.7 cm), Height Method: Stated  Last Weight: 89.1 kg (196 lb 6.9 oz) (25 05), Weight Method: Standard Scale  BMI (Calculated): 29.9  BMI Classification: overweight (BMI 25-29.9)        Ideal Body Weight (IBW), Male: 154 lb     % Ideal Body Weight, Male (lb): 123.12 %                 Usual Body Weight (UBW), k.6 kg  % Usual Body Weight: 87.88     Usual Weight Provided By: patient and EMR weight history    Wt Readings from Last 5 Encounters:   25 89.1 kg (196 lb 6.9 oz)   25 101.6 kg (224 lb)   25 101.6 kg (224 lb)   25 104.2 kg (229 lb 12.8 oz)   24 107.7 kg (237 lb 6.4 oz)     Weight Change(s) Since Admission: #  Wt Readings from Last 1 Encounters:   25 0524 89.1 kg (196 lb 6.9 oz)   25 0848 86 kg (189 lb 9.5 oz)   Admit Weight: 86 kg (189 lb 9.5 oz) (25 0848), Weight Method: Standard Scale    Estimated Needs    Weight Used For Calorie Calculations: 89.1 kg (196 lb 6.9 oz)  Energy Calorie Requirements (kcal): 2227 kcal/d; 25 kcal/kg  Energy Need Method: Kcal/kg  Weight Used For Protein Calculations: 89.1 " kg (196 lb 6.9 oz)  Protein Requirements: 107 gm protein/d; 1.2 gm/kg  Fluid Requirements (mL): 2227 ml/d; 1ml/jaelyn        Enteral Nutrition     Patient not receiving enteral nutrition at this time.    Parenteral Nutrition     Patient not receiving parenteral nutrition support at this time.    Evaluation of Received Nutrient Intake    Calories: not meeting estimated needs  Protein: not meeting estimated needs    Patient Education     Not applicable.    Nutrition Diagnosis     PES: Inadequate oral intake related to chronic illness as evidenced by NPO; eating 50% or less; 12% wt loss . (new)     PES: Severe chronic disease or condition related malnutrition Related to chronic dz process  As Evidenced by:  - weight loss: > 7.5% in 3 months - energy intake: <= 50% for 4 weeks (meets criteria for <= 50% >= 1 month (severe - social/environmental)) - muscle mass depletion: 1 area of mild muscle loss (Temporalis) new    Nutrition Interventions     Intervention(s): modified composition of meals/snacks, modified composition of parenteral nutrition, modified rate of parenteral nutrition, commercial beverage, multivitamin/mineral supplement therapy, and collaboration with other providers  Intervention(s): Care coordination or referral;Oral diet/nutrient modifications;Oral nutritional supplement    Goal: Meet greater than 80% of nutritional needs by follow-up. (new)  Goal: Maintain weight throughout hospitalization. (new)    Nutrition Goals & Monitoring     Dietitian will monitor: food and beverage intake, parenteral nutrition intake, and weight  Discharge planning: too early to determine; pending clinical course  Nutrition Risk/Follow-Up: patient at increased nutrition risk; dietitian will follow-up twice weekly   Please consult if re-assessment needed sooner.

## 2025-06-20 NOTE — DISCHARGE SUMMARY
[facility] AMA Summary    Admitting Physician: Estiven Peters MD  Attending Physician: Netta Person MD   Resident: Yanet  Intern: James   Date of Admit: 6/19/2025  Date of Discharge: 6/21/2025    Condition: Stable  Outcome: AMA  DISPOSITION: AMA    Discharge Diagnoses     Problem List[1]    Principal Problem:  <principal problem not specified>    Consultants and Procedures     Consultants:  IP CONSULT TO INTERNAL MEDICINE  IP CONSULT TO REGISTERED DIETITIAN/NUTRITIONIST  IP CONSULT TO SOCIAL WORK/CASE MANAGEMENT    Procedures:   * No surgery found *     Brief Admission History      Arcadio Scott is a 63 yo M w PMHx of Grade II diastolic dysfunction, HTN, HLD, polysubstance abuse, gastritis 2/2 H pylori infection in 2023, chronic polyneuropathy, overactive bladder, and BPH who presents to [facility] ED on 6/19/25 with complaints of decreased PO intake 2/2 intractable nausea and vomiting. Patient reports symptoms started roughly 4 days ago however this is an acute on chronic issue since peptic ulcer 2/2 H pylori infection was diagnosed in 2023. Per chart review, repeat fecal antigen test negative in 2024. He cannot hold solid food down but can tolerate liquids. Patient also reports unintentional weight loss of nearly 30lbs in the last 3-4 months as well as non-radiating burning epigastric pain. Denies sick contacts, fevers, chills, hematemesis, melena, hematochezia.      Though patient denied all tobacco and recreational drug use, per chart review patient goes through a quarter to a half a ppd from the last 16 years. Patient denied alcohol dependence but drinks 4 beers and 3 x 8oz glass of vodka on the weekends. He denies daily EtOH use. UDS+ for fentanyl and EtOH level negative prior to this admission.      Upon ED arrival, patient was tachycardic 120s with SBP ranging 130-150s. Orthostatics negative following 1L LR bolus. Lab work consistent with starvation ketoacidosis with anion gap 30, ETTA Cr/BUN  "1.60/8.4, transaminitis AST/ALT 61/23, mild pancreatitis lipase 158. UA consistent with proteinuria, hematuria, pH 6.0 consistent with RTA. XR abd negative for heavy bowel burden and signs of acute obstruction. Abdomen U/S consistent with hepatic steatosis. CTAP with IV contrast not consistent with obstructive hydronephrosis, acute cholecystitis, biliary dilation and pancreatitis. EKG consistent with sinus tachycardia.  Given 1 x 1L LR bolus as above, 1 x banana bag, 1 x compazine 5mg IV, 1 x SL Zofran 4mg.     Hospital Course with Pertinent Findings     Patient was hospitalized for course of 2 days due to intractable nausea and vomiting. Symptoms resolved night of admission and he was able to tolerate PO intake with normal bowel movements following this. Repeat H pylori negative. Electrolytes abnormalities were critical on Day 2 of hospitalization and required multiple attempts at repletion.     Physical exam:  Vitals  BP: (!) 162/80  Temp: 97.5 °F (36.4 °C)  Temp Source: Oral  Pulse: 80  Resp: 18  SpO2: 98 %  Height: 5' 8" (172.7 cm)  Weight: 89.1 kg (196 lb 6.9 oz)    Physical Exam  Vitals and nursing note reviewed.   Constitutional:       General: He is not in acute distress.     Appearance: He is normal weight. He is not ill appearing. He is not toxic-appearing or diaphoretic.   HENT:      Head: Normocephalic and atraumatic.      Mouth/Throat:      Mouth: Mucous membranes are moist.      Pharynx: Oropharynx is clear.   Eyes:      General: No scleral icterus.     Pupils: Pupils are equal, round, and reactive to light.   Cardiovascular:      Rate and Rhythm: Regular rhythm and rate.       Heart sounds: No murmur heard.  Pulmonary:      Effort: Pulmonary effort is normal. No respiratory distress.      Breath sounds: Normal breath sounds. No wheezing, rhonchi or rales.   Abdominal:      General: Abdomen is protuberant. There is distension.      Palpations: Abdomen is soft. There is no fluid wave.      Tenderness: " There is no abdominal tenderness. There is no right CVA tenderness, left CVA tenderness or guarding. Negative signs include Marquis's sign.   Musculoskeletal:         General: No swelling or tenderness. Normal range of motion.      Right lower leg: No edema.      Left lower leg: No edema.   Skin:     General: Skin is warm and dry.      Capillary Refill: Capillary refill takes less than 2 seconds.      Coloration: Skin is not jaundiced.      Findings: No bruising or rash.   Neurological:      General: No focal deficit present.      Mental Status: He is alert and oriented to person, place, and time. Mental status is at baseline.      GCS: GCS eye subscore is 4. GCS verbal subscore is 5. GCS motor subscore is 6.      Cranial Nerves: No cranial nerve deficit or facial asymmetry.      Motor: No weakness, tremor, abnormal muscle tone or seizure activity.      Coordination: Coordination normal.      Comments: No asterixis    Psychiatric:         Attention and Perception: Attention normal. He does not perceive auditory or visual hallucinations.         Mood and Affect: Mood and affect normal.         Speech: Speech normal.         Behavior: Behavior is not agitated or aggressive. Behavior is cooperative.         Thought Content: Thought content is not delusional. Thought content does not include homicidal or suicidal ideation.         Cognition and Memory: Cognition normal. He does not exhibit impaired recent memory.         Judgment: Judgment is not impulsive or inappropriate.       Despite our efforts, Arcadio Scott has decided to leave AGAINST MEDICAL ADVICE.  he  has normal mental status and full decisional capacity. he understands his medical comorbidities including chronic N/V and electrolyte abnormalities pending further evaluation. Extensive discussion regarding risks of leaving AMA were had, including but not limited to arrhythmias, organ damage, permanent disability, sudden death, ECT., he had the opportunity to ask  questions about their medical condition and all questions were answered.  Again, explained the importance of remaining for further investigation, but he continues to refuse.  The patient has been informed to return for care if worsening or at any time, and has been referred to their local medical physician for follow-up ASAP.    Dana Holt MD  [facility]        [1]   Patient Active Problem List  Diagnosis    Mass of soft tissue of neck    Epidermal cyst of face    Epidermal cyst of neck    Screening for malignant neoplasm of prostate    Chronic bilateral low back pain without sciatica    Polyneuropathy    Peripheral sensory-motor axonal polyneuropathy    Hx of colonic polyps    Nausea and vomiting    Alcohol use    Hypokalemia    Microscopic hematuria    Transaminitis    Hypertension    Intractable vomiting    DDD (degenerative disc disease), lumbar    Reading impairment    Fall    Anemia    H. pylori infection    BPH (benign prostatic hyperplasia)    OAB (overactive bladder)    Nocturia    Spinal stenosis of lumbar region without neurogenic claudication    Erectile dysfunction    Abnormal stress test    Mixed hyperlipidemia    Class 2 severe obesity due to excess calories with serious comorbidity and body mass index (BMI) of 35.0 to 35.9 in adult    Difficulty walking    Falls    Idiopathic acute pancreatitis without infection or necrosis    Severe malnutrition

## 2025-06-20 NOTE — PLAN OF CARE
Problem: Adult Inpatient Plan of Care  Goal: Plan of Care Review  Outcome: Ongoing  Goal: Patient-Specific Goal (Individualized)  Outcome: Ongoing  Goal: Absence of Hospital-Acquired Illness or Injury  Outcome: Ongoing  Goal: Optimal Comfort and Wellbeing  Outcome: Ongoing  Goal: Readiness for Transition of Care  Outcome: Ongoing     Problem: Wound  Goal: Optimal Coping  Outcome: Ongoing  Goal: Optimal Functional Ability  Outcome: Ongoing  Goal: Absence of Infection Signs and Symptoms  Outcome: Ongoing  Goal: Improved Oral Intake  Outcome: Ongoing  Goal: Optimal Pain Control and Function  Outcome: Ongoing  Goal: Skin Health and Integrity  Outcome: Ongoing  Goal: Optimal Wound Healing  Outcome: Ongoing     Problem: Constipation  Goal: Effective Bowel Elimination  Outcome: Ongoing

## 2025-06-20 NOTE — PT/OT/SLP PROGRESS
Physical Therapy Treatment    Patient Name:  Arcadio Scott   MRN:  [id number]    Recommendations     Therapy Intensity Recommendations at Discharge: No Therapy Indicated  Discharge Equipment Recommendations:  TBD - rolling walker vs quad cane (patient has quad cane)  Barriers to discharge: fall risk and decreased endurance    Assessment     Arcadio Scott is a 64 y.o. male admitted with a medical diagnosis of:  1. Metabolic acidosis    2. Constipation    3. Pancreatitis    4. Tachycardia    5. Chest pain    6. Idiopathic acute pancreatitis without infection or necrosis    7. Nausea and vomiting, unspecified vomiting type    8. Alcohol use       Problem List[1]   He presents with the following impairments/functional limitations:  impaired endurance, gait instability, impaired balance.    Rehab Prognosis: Good.    Patient would benefit from continued skilled acute PT services to: address above listed impairments/functional limitations; receive patient/caregiver education; reduce fall risk; and maximize independency/safety with functional mobility.    -continued: ambulation, with progression of gait distance/frequency/duration and speed, step negotiation, as tolerated/appropriate, with assistance and supervision    Recent Surgery: * No surgery found *      Plan     During this hospitalization, patient to be seen 5 x/week to address the identified impairments/functional limitations via gait training, therapeutic exercises and progress toward the established goals.    Plan of Care Expires:  07/17/25    Subjective     Communicated with patient's nurse Jess prior to session.    Patient agreeable to participate in treatment session.    Chief Complaint: NPO for test  Patient/Family Comments/goals: home  Pain/Comfort:  Pain Rating 1: 0/10  Pain Addressed 1: Nurse notified  Pain Rating Post-Intervention 1: 0/10    Objective     Patient found supine in bed, with HOB elevated, and with bed rails up bilateral HOB with peripheral  IV, telemetry  upon PT entry to room.    General Precautions: Standard, fall, seizure, vision impaired, NPO  Orthopedic Precautions:N/A   Braces: N/A  Respiratory Status: room air  SAT O2 Check: n/a    Functional Mobility:    Bed Mobility:  N/A - Patient seated on edge of bed at start of session and left seated on edge of bed at end of session    Transfers:  Sit to Stand: modified independence with rolling walker and quad cane  Stand to Sit: modified independence with rolling walker and quad cane  with no cues required    Gait:  Patient ambulated 130ft with rolling walker and modified independence (w/ step negotiation)  Sitting rest x3 minutes  Patient ambulated 130ft with rolling walker and modified independence  Sitting rest x3 minutes  Patient ambulated 130ft with quad cane and supervision  Patient demonstrates :       steady gait       upright posture       no loss of balance       no mis-steps       decreased marleen       decreased bilateral step length       decreased left arm swing w/ use of quad cane on right    Other Mobility:  Steps: Patient ascended/descended 5 steps with no device with bilat handrails with supervision    Balance:  Sit  Patient demonstrated static balance on level surface with independence with no verbal cues.  Patient demonstrated dynamic balance on level surface with independence with no verbal cues during moderate excursions.  Stand  Patient demonstrated static balance on level surface  using rolling walker with modified independence with no verbal cues.    Patient left sitting on edge of bed (patient declined supine positioning in bed) with HOB elevated, and with bed rails up bilateral HOB, with peripheral IV with all lines intact, call button in reach, tray table at bedside, and patient's nurse notified.    DME Justifications:  TBD pending progress - currently - No DME recommended requiring DME justifications    Education     Patient educated on and assisted with functional mobility  as noted above.  Patient educated on PT Plan of Care  Patient was instructed to utilize staff assistance for mobility/transfers.  White board updated regarding patient's safest level of mobility with staff assistance.    Goals     Multidisciplinary Problems       Physical Therapy Goals       Problem: Physical Therapy    Goal Priority Disciplines Outcome Interventions   Physical Therapy Goal     PT, PT/OT Progressing    Description: REVIEWED 06/20/2025  Goals to be met by: DISCHARGE  Patient will increase functional independence with mobility by performing:  -. Supine to sit with [place] - ONGOING  -. Sit to supine with [place] - ONGOING  -. Rolling to Left and Right with [place] - ONGOING  -. Sit to stand transfer with Modified [place] - MET 06/20/2025  -. Gait  x 260 feet with Modified [place] using Rolling Walker vs Quad Cane - ONGOING  -. Ascend/descend 5 steps with bilateral Handrails Modified [place] using Quad Cane - ONGOING           Time Tracking     PT Received On: 06/20/25  PT Start Time: 1117     PT Stop Time: 1140  PT Total Time (min): 23 min     Billable Minutes: Gait Training 23  Non-Billable Minutes: N/A  06/20/2025       [1]   Patient Active Problem List  Diagnosis    Mass of soft tissue of neck    Epidermal cyst of face    Epidermal cyst of neck    Screening for malignant neoplasm of prostate    Chronic bilateral low back pain without sciatica    Polyneuropathy    Peripheral sensory-motor axonal polyneuropathy    Hx of colonic polyps    Nausea and vomiting    Alcohol use    Hypokalemia    Microscopic hematuria    Transaminitis    Hypertension    Intractable vomiting    DDD (degenerative disc disease), lumbar    Reading impairment    Fall    Anemia    H. pylori infection    BPH (benign prostatic hyperplasia)    OAB (overactive bladder)    Nocturia    Spinal stenosis of lumbar region without neurogenic claudication    Erectile dysfunction    Abnormal stress test    Mixed  hyperlipidemia    Class 2 severe obesity due to excess calories with serious comorbidity and body mass index (BMI) of 35.0 to 35.9 in adult    Difficulty walking    Falls    Idiopathic acute pancreatitis without infection or necrosis    Severe malnutrition

## 2025-06-20 NOTE — PT/OT/SLP EVAL
Occupational Therapy   Evaluation and Discharge Note    Name: Arcadio Scott  MRN: [id number]  Admitting Diagnosis: <principal problem not specified>  Recent Surgery: * No surgery found *      Recommendations:     Discharge Recommendations: No Therapy Indicated  Discharge Equipment Recommendations: walker, rolling  Barriers to discharge:  Decreased caregiver support    Assessment:     Arcadio Scott is a 64 y.o. male with a medical diagnosis of <principal problem not specified>. At this time, patient is functioning at their prior level of function and does not require further acute OT services.     Plan:     During this hospitalization, patient does not require further acute OT services.  Please re-consult if situation changes.    Plan of Care Reviewed with: patient    Subjective     Chief Complaint: no complaints at this time  Patient/Family Comments/goals: return home to [facility]    Occupational Profile:  Living Environment: patient lives in trailer with friend, 3-4 steps at entrance of trailer with bilateral handrails, tub.   Previous level of function: independent with ADLs and IADLs  Roles and Routines: disabled, cuts grass, homeowner  Equipment Used at home: cane, quad  Assistance upon Discharge: friend    Pain/Comfort:  Pain Rating 1: 0/10  Pain Addressed 1: Nurse notified  Pain Rating Post-Intervention 1: 0/10    Patients cultural, spiritual, [employer] conflicts given the current situation: no    Objective:     Communicated with: nurse prior to session.  Patient found supine with peripheral IV upon OT entry to room.    General Precautions: Standard, fall, seizure  Orthopedic Precautions: N/A  Braces: N/A  Respiratory Status: Room air     Occupational Performance:    Bed Mobility:    Patient completed Rolling/Turning to Left with  independence  Patient completed Rolling/Turning to Right with independence  Patient completed Scooting/Bridging with independence  Patient completed Supine to Sit with  independence    Functional Mobility/Transfers:  Patient completed Sit <> Stand Transfer with modified independence  with  rolling walker   Patient completed Toilet Transfer Step Transfer technique with modified independence with  rolling walker    Activities of Daily Living:  Grooming: modified independence using RW  Lower Body Dressing: modified independence seated on edge of toilet  Toileting: modified independence      Cognitive/Visual Perceptual:  Cognitive/Psychosocial Skills:     -       Oriented to: Person, Place, Time, and Situation   -       Follows Commands/attention:Follows two-step commands  -       Safety awareness/insight to disability: impaired   Visual: patient with sunglasses during session due to sensitivity to light    Physical Exam:  BUE WFL for strength and ROM    Treatment & Education:  Pt. educated on OT goals, POC, orientation to environment, use of call bell for assist with transfers OOB or for any other needs due to fall risk.    Patient left sitting edge of bed with all lines intact, call button in reach, nurse notified, and PT present    GOALS:   Multidisciplinary Problems       Occupational Therapy Goals       Not on file                    DME Justifications:   Arcadio's mobility limitation cannot be sufficiently resolved by the use of a cane. His functional mobility deficit can be sufficiently resolved with the use of a Rolling Walker. Patient's mobility limitation significantly impairs their ability to participate in one of more activities of daily living.  The use of a RW will significantly improve the patient's ability to participate in MRADLS and the patient will use it on regular basis in the home.    History:     Past Medical History:   Diagnosis Date    GERD (gastroesophageal reflux disease)     Hypertension     Neuropathy     Unspecified glaucoma          Past Surgical History:   Procedure Laterality Date    ANGIOGRAM, CORONARY, WITH LEFT HEART CATHETERIZATION N/A 10/16/2024     Procedure: Angiogram, Coronary, with Left Heart Cath;  Surgeon: Chaitanya Cramer MD;  Location: [facility] CATH LAB;  Service: Cardiology;  Laterality: N/A;    CATARACT EXTRACTION W/  INTRAOCULAR LENS IMPLANT Right 8/13/2024    Procedure: EXTRACTION, CATARACT, WITH IOL INSERT- OD;  Surgeon: Duncan Arevalo MD;  Location: [facility] OR;  Service: Ophthalmology;  Laterality: Right;    CATARACT EXTRACTION W/  INTRAOCULAR LENS IMPLANT Left 9/24/2024    Procedure: EXTRACTION, CATARACT, WITH IOL INSERT- OS;  Surgeon: Duncan Arevalo MD;  Location: [facility] OR;  Service: Ophthalmology;  Laterality: Left;    COLONOSCOPY W/ POLYPECTOMY  01/28/2022    COLONOSCOPY, WITH POLYPECTOMY USING SNARE N/A 10/30/2023    Procedure: COLONOSCOPY, WITH POLYPECTOMY USING SNARE;  Surgeon: Ngoc Fenton MD;  Location: [facility] ENDOSCOPY;  Service: Gastroenterology;  Laterality: N/A;    COLONOSCOPY, WITH POLYPECTOMY USING SNARE N/A 10/30/2024    Procedure: COLONOSCOPY, WITH POLYPECTOMY USING SNARE;  Surgeon: Ngoc Fenton MD;  Location: [facility] ENDOSCOPY;  Service: Gastroenterology;  Laterality: N/A;    CYST REMOVAL      Facial    EGD, WITH CLOSED BIOPSY N/A 10/30/2023    Procedure: EGD;  Surgeon: Ngoc Fenton MD;  Location: [facility] ENDOSCOPY;  Service: Gastroenterology;  Laterality: N/A;    SURGICAL REMOVAL OF LESION OF FACE Bilateral 07/18/2022    Procedure: EXCISION, LESION, FACE AND NECK;  Surgeon: Marcelo Capps MD;  Location: [facility] OR;  Service: ENT;  Laterality: Bilateral;       Time Tracking:     OT Date of Treatment: 06/20/25  OT Start Time: 1055  OT Stop Time: 1120  OT Total Time (min): 25 min    Billable Minutes:Evaluation 25 6/20/2025

## 2025-06-21 VITALS
TEMPERATURE: 98 F | HEART RATE: 80 BPM | WEIGHT: 196.44 LBS | SYSTOLIC BLOOD PRESSURE: 162 MMHG | HEIGHT: 68 IN | BODY MASS INDEX: 29.77 KG/M2 | RESPIRATION RATE: 18 BRPM | OXYGEN SATURATION: 98 % | DIASTOLIC BLOOD PRESSURE: 80 MMHG

## 2025-06-21 DIAGNOSIS — N40.0 BENIGN PROSTATIC HYPERPLASIA WITHOUT LOWER URINARY TRACT SYMPTOMS: ICD-10-CM

## 2025-06-21 LAB
ALBUMIN SERPL-MCNC: 3.1 G/DL (ref 3.4–4.8)
ALBUMIN/GLOB SERPL: 0.8 RATIO (ref 1.1–2)
ALP SERPL-CCNC: 59 UNIT/L (ref 40–150)
ALT SERPL-CCNC: 15 UNIT/L (ref 0–55)
ANION GAP SERPL CALC-SCNC: 10 MEQ/L
AST SERPL-CCNC: 59 UNIT/L (ref 11–45)
BASOPHILS # BLD AUTO: 0.03 X10(3)/MCL
BASOPHILS NFR BLD AUTO: 0.8 %
BILIRUB SERPL-MCNC: 0.9 MG/DL
BUN SERPL-MCNC: 4.2 MG/DL (ref 8.4–25.7)
CALCIUM SERPL-MCNC: 8.6 MG/DL (ref 8.8–10)
CHLORIDE SERPL-SCNC: 102 MMOL/L (ref 98–107)
CO2 SERPL-SCNC: 23 MMOL/L (ref 23–31)
CREAT SERPL-MCNC: 0.68 MG/DL (ref 0.72–1.25)
CREAT/UREA NIT SERPL: 6
EOSINOPHIL # BLD AUTO: 0.11 X10(3)/MCL (ref 0–0.9)
EOSINOPHIL NFR BLD AUTO: 2.8 %
ERYTHROCYTE [DISTWIDTH] IN BLOOD BY AUTOMATED COUNT: 12.8 % (ref 11.5–17)
GFR SERPLBLD CREATININE-BSD FMLA CKD-EPI: >60 ML/MIN/1.73/M2
GLOBULIN SER-MCNC: 3.7 GM/DL (ref 2.4–3.5)
GLUCOSE SERPL-MCNC: 114 MG/DL (ref 82–115)
HCT VFR BLD AUTO: 34.9 % (ref 42–52)
HGB BLD-MCNC: 12.5 G/DL (ref 14–18)
IMM GRANULOCYTES # BLD AUTO: 0.03 X10(3)/MCL (ref 0–0.04)
IMM GRANULOCYTES NFR BLD AUTO: 0.8 %
LYMPHOCYTES # BLD AUTO: 1.32 X10(3)/MCL (ref 0.6–4.6)
LYMPHOCYTES NFR BLD AUTO: 34.2 %
MAGNESIUM SERPL-MCNC: 1.6 MG/DL (ref 1.6–2.6)
MCH RBC QN AUTO: 30.4 PG (ref 27–31)
MCHC RBC AUTO-ENTMCNC: 35.8 G/DL (ref 33–36)
MCV RBC AUTO: 84.9 FL (ref 80–94)
MONOCYTES # BLD AUTO: 0.48 X10(3)/MCL (ref 0.1–1.3)
MONOCYTES NFR BLD AUTO: 12.4 %
NEUTROPHILS # BLD AUTO: 1.89 X10(3)/MCL (ref 2.1–9.2)
NEUTROPHILS NFR BLD AUTO: 49 %
NRBC BLD AUTO-RTO: 0 %
PHOSPHATE SERPL-MCNC: 2.8 MG/DL (ref 2.3–4.7)
PLATELET # BLD AUTO: 68 X10(3)/MCL (ref 130–400)
PLATELETS.RETICULATED NFR BLD AUTO: 11.6 % (ref 0.9–11.2)
PMV BLD AUTO: 12 FL (ref 7.4–10.4)
POTASSIUM SERPL-SCNC: 3.1 MMOL/L (ref 3.5–5.1)
PROT SERPL-MCNC: 6.8 GM/DL (ref 5.8–7.6)
RBC # BLD AUTO: 4.11 X10(6)/MCL (ref 4.7–6.1)
SODIUM SERPL-SCNC: 135 MMOL/L (ref 136–145)
WBC # BLD AUTO: 3.86 X10(3)/MCL (ref 4.5–11.5)

## 2025-06-21 PROCEDURE — G0378 HOSPITAL OBSERVATION PER HR: HCPCS

## 2025-06-21 PROCEDURE — 36415 COLL VENOUS BLD VENIPUNCTURE: CPT | Performed by: INTERNAL MEDICINE

## 2025-06-21 PROCEDURE — 80053 COMPREHEN METABOLIC PANEL: CPT | Performed by: INTERNAL MEDICINE

## 2025-06-21 PROCEDURE — 85025 COMPLETE CBC W/AUTO DIFF WBC: CPT | Performed by: INTERNAL MEDICINE

## 2025-06-21 PROCEDURE — 96366 THER/PROPH/DIAG IV INF ADDON: CPT

## 2025-06-21 PROCEDURE — 84100 ASSAY OF PHOSPHORUS: CPT | Performed by: INTERNAL MEDICINE

## 2025-06-21 PROCEDURE — 83735 ASSAY OF MAGNESIUM: CPT | Performed by: INTERNAL MEDICINE

## 2025-06-21 PROCEDURE — 94761 N-INVAS EAR/PLS OXIMETRY MLT: CPT

## 2025-06-21 RX ORDER — POTASSIUM CHLORIDE 20 MEQ/1
60 TABLET, EXTENDED RELEASE ORAL ONCE
Status: DISCONTINUED | OUTPATIENT
Start: 2025-06-21 | End: 2025-06-21 | Stop reason: HOSPADM

## 2025-06-21 RX ORDER — MAGNESIUM SULFATE HEPTAHYDRATE 40 MG/ML
2 INJECTION, SOLUTION INTRAVENOUS ONCE
Status: DISCONTINUED | OUTPATIENT
Start: 2025-06-21 | End: 2025-06-21 | Stop reason: HOSPADM

## 2025-06-21 NOTE — PROGRESS NOTES
[facility] Medicine  Progress Note      Patient Name: Arcadio Scott  : 1960  MRN: [id number]  Patient Class: OP- Observation   Admission Date: 2025   Length of Stay: 0  Admitting Service: Hospital Medicine  Attending Physician: Netta Person MD  PCP: No primary care provider on file.    CHIEF COMPLAINT   Hospital follow up    HOSPITAL COURSE     Brief HPI:  Arcadio Scott is a 63 yo M w PMHx of Grade II diastolic dysfunction, HTN, HLD, polysubstance abuse, gastritis 2/2 H pylori infection in , chronic polyneuropathy, overactive bladder, and BPH who presents to [facility] ED on 25 with complaints of decreased PO intake 2/2 intractable nausea and vomiting. Patient reports symptoms started roughly 4 days ago however this is an acute on chronic issue since peptic ulcer 2/2 H pylori infection was diagnosed in . Per chart review, repeat fecal antigen test negative in . He cannot hold solid food down but can tolerate liquids. Patient also reports unintentional weight loss of nearly 30lbs in the last 3-4 months as well as non-radiating burning epigastric pain. Denies sick contacts, fevers, chills, hematemesis, melena, hematochezia.      Though patient denied all tobacco and recreational drug use, per chart review patient goes through a quarter to a half a ppd from the last 16 years. Patient denied alcohol dependence but drinks 4 beers and 3 x 8oz glass of vodka on the weekends. He denies daily EtOH use. UDS+ for fentanyl and EtOH level negative prior to this admission.      Upon ED arrival, patient was tachycardic 120s with SBP ranging 130-150s. Orthostatics negative following 1L LR bolus. Lab work consistent with starvation ketoacidosis with anion gap 30, ETTA Cr/BUN 1.60/8.4, transaminitis AST/ALT 61/23, mild pancreatitis lipase 158. UA consistent with proteinuria, hematuria, pH 6.0 consistent with RTA. XR abd negative for heavy bowel burden and signs of acute obstruction.  Abdomen U/S consistent with hepatic steatosis. CTAP with IV contrast not consistent with obstructive hydronephrosis, acute cholecystitis, biliary dilation and pancreatitis. EKG consistent with sinus tachycardia.  Given 1 x 1L LR bolus as above, 1 x banana bag, 1 x compazine 5mg IV, 1 x SL Zofran 4mg.     Interval History:  NAEO vitals stable. No acute complaints. Bicarb drip turned off overnight as alkalosis has improved. N/V resolved. Tolerating PO intake and had 2x bowel movements which were normal. Electrolytes critical this AM, repleting all electrolytes with repeat labs this afternoon.     OBJECTIVE/PHYSICAL EXAM     VITAL SIGNS (MOST RECENT):  Temp: 97.5 °F (36.4 °C) (06/21/25 0402)  Pulse: 80 (06/21/25 0402)  Resp: 18 (06/20/25 2020)  BP: (!) 162/80 (06/21/25 0402)  SpO2: 98 % (06/21/25 0402) VITAL SIGNS (24 HOUR RANGE):  Temp:  [97.5 °F (36.4 °C)-98.9 °F (37.2 °C)]   Pulse:  [80-86]   Resp:  [18]   BP: (114-162)/(78-82)   SpO2:  [98 %-99 %]      Physical Exam  Vitals and nursing note reviewed.   Constitutional:       General: He is not in acute distress.     Appearance: He is normal weight. He is not ill appearing. He is not toxic-appearing or diaphoretic.   HENT:      Head: Normocephalic and atraumatic.      Mouth/Throat:      Mouth: Mucous membranes are moist.      Pharynx: Oropharynx is clear.   Eyes:      General: No scleral icterus.     Pupils: Pupils are equal, round, and reactive to light.   Cardiovascular:      Rate and Rhythm: Regular rhythm and rate.       Heart sounds: No murmur heard.  Pulmonary:      Effort: Pulmonary effort is normal. No respiratory distress.      Breath sounds: Normal breath sounds. No wheezing, rhonchi or rales.   Abdominal:      General: Abdomen is protuberant. There is distension.      Palpations: Abdomen is soft. There is no fluid wave.      Tenderness: There is no abdominal tenderness. There is no right CVA tenderness, left CVA tenderness or guarding. Negative signs  "include Marquis's sign.   Musculoskeletal:         General: No swelling or tenderness. Normal range of motion.      Right lower leg: No edema.      Left lower leg: No edema.   Skin:     General: Skin is warm and dry.      Capillary Refill: Capillary refill takes less than 2 seconds.      Coloration: Skin is not jaundiced.      Findings: No bruising or rash.   Neurological:      General: No focal deficit present.      Mental Status: He is alert and oriented to person, place, and time. Mental status is at baseline.      GCS: GCS eye subscore is 4. GCS verbal subscore is 5. GCS motor subscore is 6.      Cranial Nerves: No cranial nerve deficit or facial asymmetry.      Motor: No weakness, tremor, abnormal muscle tone or seizure activity.      Coordination: Coordination normal.      Comments: No asterixis    Psychiatric:         Attention and Perception: Attention normal. He does not perceive auditory or visual hallucinations.         Mood and Affect: Mood and affect normal.         Speech: Speech normal.         Behavior: Behavior is not agitated or aggressive. Behavior is cooperative.         Thought Content: Thought content is not delusional. Thought content does not include homicidal or suicidal ideation.         Cognition and Memory: Cognition normal. He does not exhibit impaired recent memory.         Judgment: Judgment is not impulsive or inappropriate.     LABS/MICRO/MEDS/DIAGNOSTICS     LABS  CBC  Recent Labs     06/20/25  0336 06/21/25  0329   WBC 4.27* 3.86*   RBC 4.30* 4.11*   HGB 12.8* 12.5*   HCT 35.8* 34.9*   MCV 83.3 84.9   MCH 29.8 30.4   MCHC 35.8 35.8   RDW 12.7 12.8   PLT 71* 68*     Anemia  No results for input(s): "HAPTOGLOBIN", "FERRITIN", "IRON", "TIBC" in the last 72 hours.  Coags  Recent Labs     06/19/25  1147   INR 1.0     Cardiac  Recent Labs     06/19/25  1320        ABG/Lactate  Recent Labs     06/19/25  2018   PH 7.550*   PCO2 28.0*   PO2 100.0   HCO3 24.5       BMP  Recent Labs     " "06/20/25  1719 06/21/25  0329    135*   K 3.3* 3.1*   CO2 21* 23   BUN 4.5* 4.2*   CREATININE 0.76 0.68*   CALCIUM 8.8 8.6*   MG 1.90 1.60   PHOS 1.2* 2.8     LFTs  Recent Labs     06/19/25  0922 06/19/25  1931 06/20/25  0336 06/21/25  0329   ALBUMIN 4.2   < > 3.2* 3.1*   GLOBULIN 5.8*   < > 4.2* 3.7*   ALKPHOS 89   < > 65 59   ALT 23   < > 16 15   AST 61*   < > 49* 59*   BILITOT 1.3   < > 1.3 0.9   LIPASE 158*  --   --   --     < > = values in this interval not displayed.     Inflammatory Markers  No results for input(s): "CRP", "LDH", "ESR" in the last 72 hours.  Lipid  No results for input(s): "CHOL", "TRIG", "LDL", "VLDL", "HDL" in the last 72 hours.  Diabetes  No results for input(s): "HGBA1C", "GLUCOSE", "MICALBCREAT" in the last 72 hours.  Thyroid  No results for input(s): "TSH", "FREET4" in the last 72 hours.       MICROBIOLOGY  Microbiology Results (last 7 days)       Procedure Component Value Units Date/Time    Blood Culture #1 **CANNOT BE ORDERED STAT** [[id number]]  (Normal) Collected: 06/19/25 1048    Order Status: Completed Specimen: Blood from Hand, Right Updated: 06/20/25 1602     Blood Culture No Growth At 24 Hours    Blood Culture #2 **CANNOT BE ORDERED STAT** [[id number]]  (Normal) Collected: 06/19/25 1049    Order Status: Completed Specimen: Blood from Hand, Left Updated: 06/20/25 1602     Blood Culture No Growth At 24 Hours               MEDICATIONS   amLODIPine  10 mg Oral Daily    atorvastatin  10 mg Oral Daily    folic acid  1 mg Oral Daily    gabapentin  100 mg Oral TID    magnesium sulfate 2 g IVPB  2 g Intravenous Once    multivitamin  1 tablet Oral Daily    pantoprazole  40 mg Oral BID    potassium chloride  60 mEq Oral Once    potassium, sodium phosphates  2 packet Oral TID    thiamine  100 mg Oral Daily         INFUSIONS         DIAGNOSTIC TESTS  XR Small Bowel Follow Through   Final Result      CT Head Without Contrast   Final Result      Sinusitis otherwise unremarkable       "   Electronically signed by: Isael Crump   Date:    06/20/2025   Time:    12:25      CT Abdomen Pelvis With IV Contrast NO Oral Contrast   Final Result      Hepatomegaly and hepatic steatosis      Otherwise unremarkable         Electronically signed by: Isael Crump   Date:    06/19/2025   Time:    12:05      US Abdomen Complete   Final Result      Layering gallbladder sludge with borderline CBD dilatation.      Hepatomegaly and hepatic steatosis.         Electronically signed by: Lazaro Dunaway   Date:    06/19/2025   Time:    11:50      X-Ray Abdomen Flat And Erect   Final Result      No acute process is identified.         Electronically signed by: David Walton   Date:    06/19/2025   Time:    10:06           Nuc Stress EF   Date Value Ref Range Status   09/03/2024 59 % Final     Nuc Rest EF   Date Value Ref Range Status   09/03/2024 59  Final          ASSESSMENT/PLAN     Intractable N/V - resolved   Decreased PO intake - resolved   ETTA w suspected RTA - baseline Cr. 0.8-0.9 - resolved   Starvation Ketoacidosis with Anion Gap 30 - resolved    Hx of gastritis 2/2 H pylori   - Does not meet SIRS or septic criteria on admission   - Discontinue IVF   - Continue Protonix BID with rescue maalox   - Encourage PO intake     Multiple electrolyte abnormalities   - Repleting all electrolytes, repeat labs in afternoon      Hx of Polysubstance abuse - Hx of EtOH abuse and UDS + fentanyl  Hx of Gait Instability   Transaminitis 2/2 hepatic steatosis   Thrombocytopenia   - Unsure when his last drink was, at baseline mentation upon admission    - CIWA protocol, seizure, delirium and fall precautions in place  - PRN Diazepam in case of witnessed seizures   - Daily folate, thiamine and multivitamin supplementation   - PT/OT/Nutrition/CM consulted      Proteinuria 2+ on UA  - Protein creatinine ratio nonnephrotic range      HTN  Hx of HFpEF w Grade II Diastolic Dysfunction on Echo 10/7/2024   - Continue home Amlodipine 10mg qd    - PRN antihypertensives in place   - BP goal <140/90  - Continuous cardiac monitoring, strict I&Os and daily weights   - Daily AM labs, replete electrolytes as needed       HLD   - Continue home Lipitor 10mg qd      Chronic Polyneuropathy 2/2 DDD  - Continue home gabapentin 100mg TID, acceptable dosing for current CrCl    Access: Peripheral   Antibiotics: None   Diet: Clear with Advance as tolerated   DVT Prophylaxis: SCDs  GI Prophylaxis: Protonix 40mg BID    Fluids: LR      Dana Holt MD  [facility] HO2

## 2025-06-21 NOTE — PLAN OF CARE
Problem: Adult Inpatient Plan of Care  Goal: Plan of Care Review  Outcome: Progressing  Goal: Patient-Specific Goal (Individualized)  Outcome: Progressing  Goal: Absence of Hospital-Acquired Illness or Injury  Outcome: Progressing  Goal: Optimal Comfort and Wellbeing  Outcome: Progressing  Goal: Readiness for Transition of Care  Outcome: Progressing     Problem: Wound  Goal: Optimal Coping  Outcome: Progressing  Goal: Optimal Functional Ability  Outcome: Progressing  Goal: Absence of Infection Signs and Symptoms  Outcome: Progressing  Goal: Improved Oral Intake  Outcome: Progressing  Goal: Optimal Pain Control and Function  Outcome: Progressing  Goal: Skin Health and Integrity  Outcome: Progressing  Goal: Optimal Wound Healing  Outcome: Progressing     Problem: Constipation  Goal: Effective Bowel Elimination  Outcome: Progressing

## 2025-06-23 DIAGNOSIS — N40.0 BENIGN PROSTATIC HYPERPLASIA WITHOUT LOWER URINARY TRACT SYMPTOMS: ICD-10-CM

## 2025-06-23 RX ORDER — SOLIFENACIN SUCCINATE 5 MG/1
10 TABLET, FILM COATED ORAL DAILY
Qty: 90 TABLET | Refills: 3 | Status: SHIPPED | OUTPATIENT
Start: 2025-06-23 | End: 2025-06-25

## 2025-06-23 RX ORDER — MIRABEGRON 25 MG/1
25 TABLET, FILM COATED, EXTENDED RELEASE ORAL
Qty: 90 TABLET | Refills: 3 | Status: SHIPPED | OUTPATIENT
Start: 2025-06-23 | End: 2025-06-23

## 2025-06-23 NOTE — PT/OT/SLP DISCHARGE
POST DISCHARGE DOCUMENTATION - 06/23/2025 12:48 PM    Physical Therapy Discharge Summary    Name: Arcadio Scott  MRN: [id number]   Principal Problem:  1. Metabolic acidosis    2. Constipation    3. Pancreatitis    4. Tachycardia    5. Chest pain    6. Idiopathic acute pancreatitis without infection or necrosis    7. Nausea and vomiting, unspecified vomiting type    8. Alcohol use       Problem List[1]   Recommendations - per last treatment session     Therapy Intensity Recommendations at Discharge: No Therapy Indicated  Discharge Equipment Recommendations:  (TBD - rolling walker vs quad cane (patient has quad cane))     Assessment:     Patient last seen by PT SERVICES on 06/20/2025    Refer to therapy's initial evaluation or last treatment (progress) note for patient's last known functional status, goal achievement and therapists' recommendations.    Patient left the hospital against medical advice.    Objective     GOALS: - 1 of 6 STG's met by patient    Multidisciplinary Problems       Physical Therapy Goals       Problem: Physical Therapy    Goal Priority Disciplines Outcome Interventions   Physical Therapy Goal     PT, PT/OT Progressing    Description: REVIEWED 06/20/2025  Goals to be met by: DISCHARGE  Patient will increase functional independence with mobility by performing:  -. Supine to sit with [place] - ONGOING  -. Sit to supine with [place] - ONGOING  -. Rolling to Left and Right with [place] - ONGOING  -. Sit to stand transfer with Modified [place] - MET 06/20/2025  -. Gait  x 260 feet with Modified [place] using Rolling Walker vs Quad Cane - ONGOING  -. Ascend/descend 5 steps with bilateral Handrails Modified [place] using Quad Cane - ONGOING           Plan     Patient Discharged to: N/A - patient left against medical advice per chart.    6/23/2025       [1]   Patient Active Problem List  Diagnosis    Mass of soft tissue of neck    Epidermal cyst of face    Epidermal cyst of  neck    Screening for malignant neoplasm of prostate    Chronic bilateral low back pain without sciatica    Polyneuropathy    Peripheral sensory-motor axonal polyneuropathy    Hx of colonic polyps    Nausea and vomiting    Alcohol use    Hypokalemia    Microscopic hematuria    Transaminitis    Hypertension    Intractable vomiting    DDD (degenerative disc disease), lumbar    Reading impairment    Fall    Anemia    H. pylori infection    BPH (benign prostatic hyperplasia)    OAB (overactive bladder)    Nocturia    Spinal stenosis of lumbar region without neurogenic claudication    Erectile dysfunction    Abnormal stress test    Mixed hyperlipidemia    Class 2 severe obesity due to excess calories with serious comorbidity and body mass index (BMI) of 35.0 to 35.9 in adult    Difficulty walking    Falls    Idiopathic acute pancreatitis without infection or necrosis    Severe malnutrition

## 2025-06-24 LAB
BACTERIA BLD CULT: NORMAL
BACTERIA BLD CULT: NORMAL

## 2025-06-25 DIAGNOSIS — N40.0 BENIGN PROSTATIC HYPERPLASIA WITHOUT LOWER URINARY TRACT SYMPTOMS: ICD-10-CM

## 2025-06-25 RX ORDER — SOLIFENACIN SUCCINATE 5 MG/1
10 TABLET, FILM COATED ORAL
Qty: 90 TABLET | Refills: 3 | Status: SHIPPED | OUTPATIENT
Start: 2025-06-25

## 2025-08-19 DIAGNOSIS — Z87.891 PERSONAL HISTORY OF TOBACCO USE, PRESENTING HAZARDS TO HEALTH: Primary | ICD-10-CM

## 2025-08-22 ENCOUNTER — HOSPITAL ENCOUNTER (OUTPATIENT)
Dept: RADIOLOGY | Facility: HOSPITAL | Age: 65
Discharge: HOME | End: 2025-08-22
Attending: INTERNAL MEDICINE
Payer: MEDICARE

## 2025-08-22 DIAGNOSIS — Z87.891 PERSONAL HISTORY OF TOBACCO USE, PRESENTING HAZARDS TO HEALTH: ICD-10-CM

## 2025-08-22 PROCEDURE — 71271 CT THORAX LUNG CANCER SCR C-: CPT | Mod: TC

## (undated) DEVICE — BLADE SURG STAINLESS STEEL #15

## (undated) DEVICE — POSITIONER HEAD ADULT

## (undated) DEVICE — CATH OPTITORQUE RADIAL 5FR

## (undated) DEVICE — NDL HYPO A BEVEL 30X1/2

## (undated) DEVICE — INTRODUCER TRNSRADIAL 6F 10CM

## (undated) DEVICE — MOUTHPIECE ENDO 60FR

## (undated) DEVICE — TIP I & A

## (undated) DEVICE — PAD EYE STERILE 1-5/8X2-5/8IN

## (undated) DEVICE — GAUZE VISTEC XR DTECT 16 4X4IN

## (undated) DEVICE — KIT SURGICAL COLON .25 1.1OZ

## (undated) DEVICE — DRESSING TRANS 2X2 TEGADERM

## (undated) DEVICE — PACK FLUIDICS ADAPTIVE BASIC

## (undated) DEVICE — Device

## (undated) DEVICE — STAPLER SKIN ROTATING HEAD

## (undated) DEVICE — SUPPORT ULNA NERVE PROTECTOR

## (undated) DEVICE — GLOVE PROTEXIS LTX MICRO 6.5

## (undated) DEVICE — CANNULA IRR ANTERIOR 27GX4MM

## (undated) DEVICE — KNIFE OPHTH 42 DEG 2.5MM SLIT

## (undated) DEVICE — SUT MCRYL PLUS 4-0 PS2 27IN

## (undated) DEVICE — GLOVE 8.0 PROTEXIS PI MICRO

## (undated) DEVICE — NDL ECLIPSE SAFETY 18GX1-1/2IN

## (undated) DEVICE — SUT PROLENE 5/0 RB-1 36 IN

## (undated) DEVICE — PAD DEFIB CADENCE ADULT R2

## (undated) DEVICE — GOWN X-LG STERILE BACK

## (undated) DEVICE — BETADINE OPTHALMIC SOL 5% 30ML

## (undated) DEVICE — DUOVISC

## (undated) DEVICE — FORCEP ALLIGATOR 2.8MM W/NDL

## (undated) DEVICE — MANIFOLD 4 PORT

## (undated) DEVICE — KNIFE SURG LASEREDGE + 1.1MM

## (undated) DEVICE — HANDLE DEVON RIGID OR LIGHT

## (undated) DEVICE — CORD BIPOLAR 12 FOOT

## (undated) DEVICE — SYR LUER LOCK 1CC

## (undated) DEVICE — GLOVE 7.5 PROTEXIS PI MICRO

## (undated) DEVICE — TRAY SKIN SCRUB WET PREMIUM

## (undated) DEVICE — COVER CIV-FLEX PROBE US 58IN

## (undated) DEVICE — HANDPIECE OPTH 1.8MM

## (undated) DEVICE — TOWEL OR XRAY BLUE 17X26IN

## (undated) DEVICE — SLEEVE OPTH 2.4MM

## (undated) DEVICE — GUIDEWIRE EMERALD 3MM 175X5CM

## (undated) DEVICE — SLEEVE ULTRA INFUSION

## (undated) DEVICE — SOL BALANCED SALT 500ML

## (undated) DEVICE — MARKER WRITESITE SKIN CHLRAPRP

## (undated) DEVICE — SNARE EXACTO COLD

## (undated) DEVICE — ELECTRODE BLADE INSULATED 1 IN

## (undated) DEVICE — DRSNG POLYSKIN TRNSPAR 4X4.75

## (undated) DEVICE — DRESSING POLYSKIN II 2X2.75IN

## (undated) DEVICE — GLOVE PROTEXIS BLUE LATEX 6.5

## (undated) DEVICE — NDL BLUNT FILL 18G 1IN

## (undated) DEVICE — SHAMPOO BABY CARE 1.7OZ

## (undated) DEVICE — SYR 10CC LUER LOCK

## (undated) DEVICE — HEMOSTAT VASC BAND REG 24CM

## (undated) DEVICE — VISION BLUE

## (undated) DEVICE — DRAPE RADIAL BRACHIAL 29X42IN

## (undated) DEVICE — CARTRIDGE LENS D

## (undated) DEVICE — LIDOCAINE HCI VISCOUS SOL 2%

## (undated) DEVICE — SOL NACL IRR 1000ML BTL

## (undated) DEVICE — GAUZE SPONGE 4X4 12PLY

## (undated) DEVICE — TRAP ETRAP POLYP 50 TRAY

## (undated) DEVICE — OMNIPAQUE 350MG 150ML VIAL

## (undated) DEVICE — GLOVE PROTEXIS LTX MICRO  7.5

## (undated) DEVICE — NDL HYPO 27G X 1 1/2

## (undated) DEVICE — GLOVE PROTEXIS BLUE LATEX 7.5